# Patient Record
Sex: MALE | Race: WHITE | Employment: FULL TIME | URBAN - METROPOLITAN AREA
[De-identification: names, ages, dates, MRNs, and addresses within clinical notes are randomized per-mention and may not be internally consistent; named-entity substitution may affect disease eponyms.]

---

## 2019-07-31 ENCOUNTER — HOSPITAL ENCOUNTER (OUTPATIENT)
Dept: PHYSICAL THERAPY | Age: 49
Discharge: HOME | End: 2019-07-31
Payer: OTHER GOVERNMENT

## 2019-07-31 PROCEDURE — 97161 PT EVAL LOW COMPLEX 20 MIN: CPT

## 2019-07-31 PROCEDURE — 97140 MANUAL THERAPY 1/> REGIONS: CPT

## 2019-07-31 PROCEDURE — 97110 THERAPEUTIC EXERCISES: CPT

## 2019-07-31 NOTE — PROGRESS NOTES
PT DAILY TREATMENT NOTE/SHOULDER EVAL 10-18    Patient Name: Britta Olivia  Date:2019  : 1970  [x]  Patient  Verified  Payor:  / Plan: Lex Ordoñez 74 / Product Type:  /    In time:3:00  Out time:3:45  Total Treatment Time (min): 45  Visit #: 1 of 12    Treatment Area: Right shoulder pain [M25.511]    SUBJECTIVE  Pain Level (0-10 scale): 1  [x]constant []intermittent []improving []worsening []no change since onset    Any medication changes, allergies to medications, adverse drug reactions, diagnosis change, or new procedure performed?: [x] No    [] Yes (see summary sheet for update)  Subjective functional status/changes:     PLOF: working out 5 days  Limitations to PLOF: not able to workout  Mechanism of Injury: 2 weeks ago with next day after pull ups and push ups. Cortisone injection no help  Current symptoms/Complaints: Pain increases to 4/10 with push ups, pull ups. Describes pain as constant. Radicular symptoms in right UE with falling asleep on stomach. 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 \"    OBJECTIVE/EXAMINATION      27 min [x]Eval                  []Re-Eval       8 min Therapeutic Exercise:  [x] See flow sheet :   Rationale: increase ROM and increase strength to improve the patients ability to perform daily activities with decreased pain and symptom levels    10 min Manual Therapy:  PROM with overpressure into flexion, grade III GHJ mobs, STM to pec minor, subscap   Rationale: decrease pain, increase ROM and increase tissue extensibility to perform daily activities with decreased pain and symptom levels          With   [] TE   [] TA   [] neuro   [] other: Patient Education: [x] Review HEP    [] Progressed/Changed HEP based on:   [] positioning   [] body mechanics   [] transfers   [] heat/ice application    [] other:      Other Objective/Functional Measures: see initial [facility]     Physical Therapy Evaluation - Shoulder    Posture: [] Poor    [] Fair    [x] Good    Describe:    ROM:  [] Unable to assess at this time                                           AROM                                                              PROM   Left Right  Left Right   Flexion 165* 165*  Flexion     Extension   Extension     Scaption/ABD [facility] Scaptin/ABD     ER @ 0 Degrees   ER @ 0 Degrees     ER @ 90 Degrees WFL WFL ER @ 90 Degrees     IR @ 90 Degrees 50*  T7 27*   T12 IR @ 90 Degrees       End Feel / Painful Arc: firm     Strength:   [] Unable to assess at this time                                                                            L (1-5) R (1-5) Pain   Flexors 5 5 [] Yes   [] No   Abductors 5 5 [] Yes   [] No   External Rotators 5 5 [] Yes   [] No   Internal Rotators 5 5 [] Yes   [] No   Supraspinatus 5 4 [] Yes   [] No   Serratus Anterior 5 4 [] Yes   [] No   Lower Trapezius 4 4 [] Yes   [] No   Elbow Flexion 5 5 [] Yes   [] No   Elbow Extension 5 5 [] Yes   [] No       Scapulohumoral Control / Rhythm:  Able to eccentrically lower with good control?  Left: [] Yes   [x] No     Right: [] Yes   [x] No    Accessory Motions: lateral scapular winging on right, UT compensation    Palpation  [] Min  [x] Mod  [] Severe    Location: right pec minor, subscap, medial elbow flexors  [] Min  [] Mod  [] Severe    Location:  [] Min  [] Mod  [] Severe    Location:    Optional Tests:    Sensation Left Right Reflexes Left Right   Biceps (C5)   Biceps (C5)     [place] Radial(C6-7)   Brachioradialis (C6)     [place] Ulnar(C8-T1)   Triceps (C7)       Adson's Test  [] Pos   [] Neg Yergason's Test [] Pos   [x] Neg  Maranda's Test  [] Pos   [] Neg [place]'s Sign [] Pos   [] Neg  Neer's Test  [] Pos   [x] Neg Clunk Test  [] Pos   [] Neg  Hawkin's Test  [] Pos   [x] Neg AC Joint  [] Pos   [x] Neg  Speed's Test  [] Pos   [x] Neg SC Joint  [] Pos   [] Neg  Empty Can  [] Pos   [x] Neg Pectoral Tightness [x] Pos   [] Neg  Anterior Apprehension [] Pos   [x] Neg   Posterior Apprehension [] Pos   [x] Neg      Other Tests / Comments:        Pain Level (0-10 scale) post treatment: 0    ASSESSMENT/Changes in Function: Pt is a 48yo male presenting to therapy with c/c right shoulder and medial elbow pain after working out 2 weeks ago. Pt reports Xrays unremarkable with US showing RC tear. Pain increases with reaching overhead, abduction, and completing push ups and pull ups. Pt demonstrates decreased right shoulder AROM especially IR with increased thoracic extension noted, decreased scapular strength however WFL shoulder girdle, TTP over medial elbow flexors, pec minor, subscap, and lats, and positive impingement tests. Signs and symptoms consistent with mechanical shoulder pain possibly contributing to medial epicondylitis. Pt would benefit from skilled PT services to address the above impairments to allow pt to return to working out with pain.'     Patient will continue to benefit from skilled PT services to modify and progress therapeutic interventions, address functional mobility deficits, address ROM deficits, address strength deficits, analyze and address soft tissue restrictions, analyze and cue movement patterns, analyze and modify body mechanics/ergonomics, assess and modify postural abnormalities and instruct in home and community integration to attain remaining goals. []  See Plan of Care  []  See progress note/recertification  []  See Discharge Summary         Progress towards goals / Updated goals:  Short Term Goals: STG- To be accomplished in 2 week(s):  1. Pt will be independent with HEP to encourage prophylaxis. Eval: HEP dispensed     Long Term Goals: LTG- To be accomplished in 6 week(s):  1. Pt will report >/=75% improvement in symptoms to be able complete ADLs with less pain. Eval: 4/10 max pain in right shoulder and medial elbow with working out, reaching overhead and abduction    2. Pt will be able to complete pull ups and push ups without pain to demonstrate improved scapular strength  Eval:increased thoracic and lumbar extension, pain with both     3. Pt right shoulder IR AROM will improve to demonstrate improved shoulder and thoracic mobility. Eval: right 27* IR @ 90*, functional IR T12   Left 50* @ 90*, functional IR T7      4. Pt FOTO score will increase by >/=17 points to show improvement in subjective function.   Eval:57  Current: will address at visit 5      PLAN  []  Upgrade activities as tolerated     [x]  Continue plan of care  []  Update interventions per flow sheet       []  Discharge due to:_  []  Other:_      Brian Younger 7/31/2019  2:49 PM

## 2019-07-31 NOTE — PROGRESS NOTES
[facility] at the [address], [place], [address] [facility]  Phone: [phone]      Fax:  [phone]       Plan of Care/ Statement of Necessity for Physical Therapy Services      Patient name: Ankit Pompa Start of Care: 2019   Referral source: Venora Homans, MD : 1970    Medical Diagnosis: Right shoulder pain [M25.511]   Onset Date:2 weeks     Treatment Diagnosis: right shoulder pain and medial epicondylitis   Prior Hospitalization: see medical history Provider#: [id number]   Medications: Verified on Patient summary List    Comorbidities: unremarkable   Prior Level of Function: working out 5 days a week       The Plan of Care and following information is based on the information from the initial evaluation. Assessment/ key information: Pt is a 46yo male presenting to therapy with c/c right shoulder and medial elbow pain after working out 2 weeks ago. Pt reports Xrays unremarkable with US showing RC tear. Pain increases with reaching overhead, abduction, and completing push ups and pull ups. Pt demonstrates decreased right shoulder AROM especially IR with increased thoracic extension noted, decreased scapular strength however WFL shoulder girdle, TTP over medial elbow flexors, pec minor, subscap, and lats, and positive impingement tests. Signs and symptoms consistent with mechanical shoulder pain possibly contributing to medial epicondylitis.  Pt would benefit from skilled PT services to address the above impairments to allow pt to return to working out with pain.'     Evaluation Complexity History LOW Complexity : Zero comorbidities / personal factors that will impact the outcome / POC; Examination MEDIUM Complexity : 3 Standardized tests and measures addressing body structure, function, activity limitation and / or participation in recreation  ;Presentation LOW Complexity : Stable, uncomplicated  ;Clinical Decision Making MEDIUM Complexity : FOTO score of 26-74  Overall Complexity Rating: LOW   Problem List: pain affecting function, decrease ROM, decrease strength, decrease ADL/ functional abilitiies, decrease activity tolerance and decrease flexibility/ joint mobility   Treatment Plan may include any combination of the following: Therapeutic exercise, Therapeutic activities, Neuromuscular re-education, Physical agent/modality, Manual therapy, Patient education, Self Care training and Functional mobility training  Patient / Family readiness to learn indicated by: asking questions, trying to perform skills and interest  Persons(s) to be included in education: patient (P)  Barriers to Learning/Limitations: None  Patient Goal (s): increased strength and reduce pain  Patient Self Reported Health Status: good  Rehabilitation Potential: good  Short Term Goals: STG- To be accomplished in 2 week(s):  1. Pt will be independent with HEP to encourage prophylaxis. Eval: HEP dispensed      Long Term Goals: LTG- To be accomplished in 6 week(s):  1. Pt will report >/=75% improvement in symptoms to be able complete ADLs with less pain. Eval: 4/10 max pain in right shoulder and medial elbow with working out, reaching overhead and abduction     2. Pt will be able to complete pull ups and push ups without pain to demonstrate improved scapular strength  Eval:increased thoracic and lumbar extension, pain with both      3. Pt right shoulder IR AROM will improve to demonstrate improved shoulder and thoracic mobility. Eval: right 27* IR @ 90*, functional IR T12              Left 50* @ 90*, functional IR T7        4. Pt FOTO score will increase by >/=17 points to show improvement in subjective function. Eval:57  Current: will address at visit 5      Frequency / Duration: Patient to be seen 2 times per week for 6 weeks.     Patient/ Caregiver education and instruction: Diagnosis, prognosis, self care, activity modification and exercises   [x]  Plan of care has been reviewed with PTA Merlin Mallow [facility] 7/31/2019 4:29 PM  _____________________________________________________________________  I certify that the above Therapy Services are being furnished while the patient is under my care. I agree with the treatment plan and certify that this therapy is necessary.     Physician's Signature:____________Date:_________TIME:________    [employer], Date and Time must be completed for valid certification **    Please sign and return to [facility] at the [address], [place], [address] [facility]       Phone: [phone]      Fax:  [phone]

## 2019-08-02 ENCOUNTER — HOSPITAL ENCOUNTER (OUTPATIENT)
Dept: PHYSICAL THERAPY | Age: 49
Discharge: HOME | End: 2019-08-02
Payer: OTHER GOVERNMENT

## 2019-08-02 PROCEDURE — 97140 MANUAL THERAPY 1/> REGIONS: CPT

## 2019-08-02 PROCEDURE — 97110 THERAPEUTIC EXERCISES: CPT

## 2019-08-02 NOTE — PROGRESS NOTES
PT DAILY TREATMENT NOTE    Patient Name: Nikki Hernandez  Date:2019  : 1970  [x]  Patient  Verified  Payor: ANTONY / Plan: Lex Ordoñez 74 / Product Type: Quin Corona /    In time:10:40  Out time:11:40  Total Treatment Time (min): 60  Total Timed Codes (min): 60  1:1 Treatment Time ( only): 60   Visit #: 2 of 12    Treatment Area: Right shoulder pain [M25.511]    SUBJECTIVE  Pain Level (0-10 scale): 0  Any medication changes, allergies to medications, adverse drug reactions, diagnosis change, or new procedure performed?: [x] No    [] Yes (see summary sheet for update)  Subjective functional status/changes:   [] No changes reported  \"Felt better after last time. I dont think I was doing some of the exercises correctly though. \"    OBJECTIVE      50 min Therapeutic Exercise:  [x] See flow sheet :   Rationale: increase ROM and increase strength to improve the patients ability to perform daily activities with decreased pain and symptom levels      10 min Manual Therapy:  Grade III GHJ mobs, manual pec stretch, STM to levator    Rationale: decrease pain, increase ROM and increase tissue extensibility to improve the patients ability to perform daily activities with decreased pain and symptom levels    With   [] TE   [] TA   [] neuro   [] other: Patient Education: [x] Review HEP    [] Progressed/Changed HEP based on:   [] positioning   [] body mechanics   [] transfers   [] heat/ice application    [] other:      Other Objective/Functional Measures:   fatigue with wall clocks and slides  Increased lumbar extension with push ups      Pain Level (0-10 scale) post treatment: 0    ASSESSMENT/Changes in Function: good tolerance to exercises with ability to decrease pain with decreasing rib flare and engaging. Demonstrates decreased scapular strength with easily fatigue with wall clocks and slides.      Patient will continue to benefit from skilled PT services to modify and progress therapeutic interventions, address functional mobility deficits, address ROM deficits, address strength deficits, analyze and address soft tissue restrictions, analyze and cue movement patterns, analyze and modify body mechanics/ergonomics, assess and modify postural abnormalities and instruct in home and community integration to attain remaining goals. []  See Plan of Care  []  See progress note/recertification  []  See Discharge Summary         Progress towards goals / Updated goals:  Short Term Goals: STG- To be accomplished in 2 week(s):  1.  Pt will be independent with HEP to encourage prophylaxis. Eval: HEP dispensed   Current: compliance per pt report      Long Term Goals: LTG- To be accomplished in 6 week(s):  1.  Pt will report >/=75% improvement in symptoms to be able complete ADLs with less pain. Eval: 4/10 max pain in right shoulder and medial elbow with working out, reaching overhead and abduction  Current:      2.  Pt will be able to complete pull ups and push ups without pain to demonstrate improved scapular strength  Eval:increased thoracic and lumbar extension, pain with both   Current:      3.  Pt right shoulder IR AROM will improve to demonstrate improved shoulder and thoracic mobility. Eval: right 27* IR @ 90*, functional IR T12              Left 50* @ 90*, functional IR T7  Current:       4.  Pt FOTO score will increase by >/=17 points to show improvement in subjective function.   Eval:57  Current: will address at visit 5       PLAN  [x]  Upgrade activities as tolerated     [x]  Continue plan of care  []  Update interventions per flow sheet       []  Discharge due to:_  []  Other:_       Lanie Cure 8/2/2019  10:40 AM    Future Appointments   Date Time Provider [place]   8/2/2019 10:45 AM Barbra Bean [facility]   8/5/2019  2:30 PM Cara Cardenas, PT, DPT [facility]   8/7/2019  3:45 PM Barbra Bean [facility]   8/14/2019  3:45 PM Barbra Bean [facility]   8/16/2019 10:15 AM Remesic, Arnulfo Riser [facility]   8/20/2019  7:45 AM Remesic, Arnulfo Riser [facility]   8/23/2019  8:00 AM Remesic, Arnulfo Riser [facility]   8/27/2019  8:30 AM Remesic, Arnulfo Riser [facility]   8/28/2019 12:15 PM Remesic, Arnulfo Riser [facility]   9/4/2019  3:45 PM Remesic, Arnulfo Riser [facility]   9/6/2019  2:30 PM Remesic, Arnulfo Riser [facility]

## 2019-08-05 ENCOUNTER — HOSPITAL ENCOUNTER (OUTPATIENT)
Dept: PHYSICAL THERAPY | Age: 49
Discharge: HOME | End: 2019-08-05
Payer: OTHER GOVERNMENT

## 2019-08-05 PROCEDURE — 97140 MANUAL THERAPY 1/> REGIONS: CPT

## 2019-08-05 PROCEDURE — 97110 THERAPEUTIC EXERCISES: CPT

## 2019-08-05 NOTE — PROGRESS NOTES
PT DAILY TREATMENT NOTE    Patient Name: Michelle Crew  Date:2019  : 1970  [x]  Patient  Verified  Payor:  / Plan: Lex Ordoñez 74 / Product Type:  /    In time:2:30 pm   Out time:3:30 pm   Total Treatment Time (min): 60  Total Timed Codes (min): 60  Visit #: 3 of 12    Treatment Area: Right shoulder pain [M25.511]    SUBJECTIVE  Pain Level (0-10 scale): 0  Any medication changes, allergies to medications, adverse drug reactions, diagnosis change, or new procedure performed?: [x] No    [] Yes (see summary sheet for update)  Subjective functional status/changes:   [] No changes reported  \"Pretty good. \"    OBJECTIVE    Modalities Rationale:      min [] Estim, type/location:                                      []  att     []  unatt     []  w/US     []  w/ice    []  w/heat    min []  Mechanical Traction: type/lbs                   []  pro   []  sup   []  int   []  cont    []  before manual    []  after manual    min []  Ultrasound, settings/location:      min []  Iontophoresis w/ dexamethasone, location:                                               []  take home patch       []  in clinic    min []  Ice     []  Heat    location/position:     min []  Vasopneumatic Device, press/temp:     min []  Other:    [] Skin assessment post-treatment (if applicable):    []  intact    []  redness- no adverse reaction     []redness - adverse reaction:          45 min Therapeutic Exercise:  [x] See flow sheet :   Rationale: increase ROM, increase strength, improve coordination and increase proprioception to improve the patients ability to perform daily activities with decreased pain and symptom levels      15 min Manual Therapy:  Post and inf GHJ geoffrey grade III/IVto right shoulder  Right apical expansion, right fascial pec stretch  1-person rib mobilization with inhale and exhale  STM to right lat, subscap and infraspinatus    Rationale: decrease pain, increase ROM and increase tissue extensibility to improve the patients ability to perform daily activities with decreased pain and symptom levels           With   [] TE   [] TA   [] neuro   [] other: Patient Education: [x] Review HEP    [] Progressed/Changed HEP based on:   [] positioning   [] body mechanics   [] transfers   [] heat/ice application    [] other:      Other Objective/Functional Measures:   Right Shoulder IR at 90* Abd (pre/post): 61/74      Pain Level (0-10 scale) post treatment: 0    ASSESSMENT/Changes in Function:   Challenged with maintaining neutral pelvis with lat hang. Active trigger points in lats, subscap and infraspinatus    Patient will continue to benefit from skilled PT services to modify and progress therapeutic interventions, address functional mobility deficits, address ROM deficits, address strength deficits, analyze and address soft tissue restrictions, analyze and cue movement patterns, analyze and modify body mechanics/ergonomics, assess and modify postural abnormalities and instruct in home and community integration to attain remaining goals. []  See Plan of Care  []  See progress note/recertification  []  See Discharge Summary         Progress towards goals / Updated goals:  Short Term Goals: STG- To be accomplished in 2 week(s):  1.  Pt will be independent with HEP to encourage prophylaxis. Eval: HEP dispensed   Current: compliance per pt report, updated with lat hang     Long Term Goals: LTG- To be accomplished in 6 week(s):  1.  Pt will report >/=75% improvement in symptoms to be able complete ADLs with less pain.   Eval: 4/10 max pain in right shoulder and medial elbow with working out, reaching overhead and abduction  Current:      2.  Pt will be able to complete pull ups and push ups without pain to demonstrate improved scapular strength  Eval:increased thoracic and lumbar extension, pain with both   Current:      3.  Pt right shoulder IR AROM will improve to demonstrate improved shoulder and thoracic mobility. Eval: right 27* IR @ 90*, functional IR T12              Left 50* @ 90*, functional IR T7  Current:  Right IR at 90* abd: 74* - Progressing     4.  Pt FOTO score will increase by >/=17 points to show improvement in subjective function.   Eval:57  Current: will address at visit 5    PLAN  [x]  Upgrade activities as tolerated     []  Continue plan of care  []  Update interventions per flow sheet       []  Discharge due to:_  []  Other:_      Rodney Rebolledo, PT, DPT 8/5/2019  2:33 PM    Future Appointments   Date Time Provider [place]   8/7/2019  3:45 PM Remesic, Anita Fuss [facility]   8/14/2019  3:45 PM Remesic, Anita Fuss [facility]   8/16/2019 10:15 AM Remesic, Anita Fuss [facility]   8/20/2019  7:45 AM Remesic, Anita Fuss [facility]   8/23/2019  8:00 AM Remesic, Anita Fuss [facility]   8/27/2019  8:30 AM Remesic, Anita Fuss [facility]   8/28/2019 12:15 PM Remesic, Anita Fuss [facility]   9/4/2019  3:45 PM Remesic, Anita Fuss [facility]   9/6/2019  2:30 PM Remesic, Anita Fuss [facility]

## 2019-08-07 ENCOUNTER — HOSPITAL ENCOUNTER (OUTPATIENT)
Dept: PHYSICAL THERAPY | Age: 49
Discharge: HOME | End: 2019-08-07
Payer: OTHER GOVERNMENT

## 2019-08-07 PROCEDURE — 97140 MANUAL THERAPY 1/> REGIONS: CPT

## 2019-08-07 PROCEDURE — 97110 THERAPEUTIC EXERCISES: CPT

## 2019-08-07 NOTE — PROGRESS NOTES
PT DAILY TREATMENT NOTE    Patient Name: Gaby Wood  Date:2019 : 1970  [x]  Patient  Verified  Payor:  / Plan: Lex Ordoñez 74 / Product Type:  /    In time:3:41  Out time:4:50  Total Treatment Time (min): 69  Total Timed Codes (min): 69  1:1 Treatment Time ( [address] only): 39   Visit #: 4 of 12    Treatment Area: Right shoulder pain [M25.511]    SUBJECTIVE  Pain Level (0-10 scale): 0  Any medication changes, allergies to medications, adverse drug reactions, diagnosis change, or new procedure performed?: [x] No    [] Yes (see summary sheet for update)  Subjective functional status/changes:   [] No changes reported  \"Better. \"    OBJECTIVE      57 min Therapeutic Exercise:  [x] See flow sheet :   Rationale: increase ROM and increase strength to improve the patients ability to perform daily activities with decreased pain and symptom levels    12 min Manual Therapy:  Right apical expansion, rib mobs with breaths with verbal consent for hand placement, STM to lats and subscap in supine, manual pec stretch    Rationale: decrease pain, increase ROM and increase tissue extensibility to improve the patients ability to perform daily activities with decreased pain and symptom levels          With   [] TE   [] TA   [] neuro   [] other: Patient Education: [x] Review HEP    [] Progressed/Changed HEP based on:   [] positioning   [] body mechanics   [] transfers   [] heat/ice application    [] other:      Other Objective/Functional Measures:   Decreased coordination with lat hang     Pain Level (0-10 scale) post treatment: 0    ASSESSMENT/Changes in Function: good tolerance to exercises with no pain reported. Continues to demonstrate decreased lat flexibility with reaching overhead with increased thoracic extension noted. Good form with QP exercises today with decrease pec use.       Patient will continue to benefit from skilled PT services to modify and progress therapeutic interventions, address functional mobility deficits, address ROM deficits, address strength deficits, analyze and address soft tissue restrictions, analyze and cue movement patterns, analyze and modify body mechanics/ergonomics, assess and modify postural abnormalities and instruct in home and community integration to attain remaining goals. []  See Plan of Care  []  See progress note/recertification  []  See Discharge Summary         Progress towards goals / Updated goals:  Short Term Goals: STG- To be accomplished in 2 week(s):  1.  Pt will be independent with HEP to encourage prophylaxis. Eval: HEP dispensed   Current: compliance per pt report, updated with lat hang     Long Term Goals: LTG- To be accomplished in 6 week(s):  1.  Pt will report >/=75% improvement in symptoms to be able complete ADLs with less pain. Eval: 4/10 max pain in right shoulder and medial elbow with working out, reaching overhead and abduction  Current:      2.  Pt will be able to complete pull ups and push ups without pain to demonstrate improved scapular strength  Eval:increased thoracic and lumbar extension, pain with both   Current: challenged in QP     3.  Pt right shoulder IR AROM will improve to demonstrate improved shoulder and thoracic mobility. Eval: right 27* IR @ 90*, functional IR T12              Left 50* @ 90*, functional IR T7  Current:  Right IR at 90* abd: 74* - Progressing     4.  Pt FOTO score will increase by >/=17 points to show improvement in subjective function.   Eval:57  Current: will address at visit 5    PLAN  [x]  Upgrade activities as tolerated     [x]  Continue plan of care  []  Update interventions per flow sheet       []  Discharge due to:_  []  Other:_      Nicol Victoria 8/7/2019  4:17 PM    Future Appointments   Date Time Provider Tracy Tsai   8/14/2019  3:45 PM Ernestina Bean [facility]   8/16/2019 11:00 AM Ernestina Bean [facility]   8/20/2019  7:45 AM Ernestina Bean [facility]   8/23/2019 8:00 AM Castro Bean [facility]   8/27/2019  8:30 AM Castro BeanTBMIGUEL [facility]   8/28/2019 12:15 PM Castro BeanTBMIGUEL [facility]   9/4/2019  3:45 PM Castro BeanTBMIGUEL [facility]   9/6/2019  2:30 PM Castro Bean [facility]

## 2019-08-14 ENCOUNTER — HOSPITAL ENCOUNTER (OUTPATIENT)
Dept: PHYSICAL THERAPY | Age: 49
Discharge: HOME | End: 2019-08-14
Payer: OTHER GOVERNMENT

## 2019-08-14 PROCEDURE — 97140 MANUAL THERAPY 1/> REGIONS: CPT

## 2019-08-14 PROCEDURE — 97110 THERAPEUTIC EXERCISES: CPT

## 2019-08-14 NOTE — PROGRESS NOTES
PT DAILY TREATMENT NOTE    Patient Name: Nena Messing  Date:2019  : 1970  [x]  Patient  Verified  Payor:  / Plan: Lex Ordoñez 74 / Product Type:  /    In time:3:42  Out time:4:55  Total Treatment Time (min): 73  Total Timed Codes (min): 73  1:1 Treatment Time ( [address] only): 40   Visit #: 5 of 12    Treatment Area: Right shoulder pain [M25.511]    SUBJECTIVE  Pain Level (0-10 scale): 0  Any medication changes, allergies to medications, adverse drug reactions, diagnosis change, or new procedure performed?: [x] No    [] Yes (see summary sheet for update)  Subjective functional status/changes:   [] No changes reported  \"good. No longer having elbow pain but also havent beeen push ups and pullups like I was. \"    OBJECTIVE      63 min Therapeutic Exercise:  [x] See flow sheet :   Rationale: increase ROM and increase strength to improve the patients ability to perform daily activities with decreased pain and symptom levels    10 min Manual Therapy:  Manual pec stretch, STM to elbowl flexor wad, right apical expansion, rib mobs with breaths    Rationale: decrease pain, increase ROM and increase tissue extensibility to improve the patients ability to perform daily activities with decreased pain and symptom levels    With   [] TE   [] TA   [] neuro   [] other: Patient Education: [x] Review HEP    [] Progressed/Changed HEP based on:   [] positioning   [] body mechanics   [] transfers   [] heat/ice application    [] other:      Other Objective/Functional Measures:   FOTO 74  Increased extension with pull ups     Pain Level (0-10 scale) post treatment: 0    ASSESSMENT/Changes in Function: Pt reporting no longer having right elbow pain with minimal shoulder pain. Overview of pull up form today with education to start with eccentrics and using squat bar to help with form. Overall form is improving with exercises however continued lumbar extension with planks.      Patient will continue to benefit from skilled PT services to modify and progress therapeutic interventions, address functional mobility deficits, address ROM deficits, address strength deficits, analyze and address soft tissue restrictions, analyze and cue movement patterns, analyze and modify body mechanics/ergonomics, assess and modify postural abnormalities and instruct in home and community integration to attain remaining goals. []  See Plan of Care  []  See progress note/recertification  []  See Discharge Summary         Progress towards goals / Updated goals:  Short Term Goals: STG- To be accomplished in 2 week(s):  1.  Pt will be independent with HEP to encourage prophylaxis. Eval: HEP dispensed   Current: compliance per pt report, updated with lat hang goal MET     Long Term Goals: LTG- To be accomplished in 6 week(s):  1.  Pt will report >/=75% improvement in symptoms to be able complete ADLs with less pain. Eval: 4/10 max pain in right shoulder and medial elbow with working out, reaching overhead and abduction  Current:      2.  Pt will be able to complete pull ups and push ups without pain to demonstrate improved scapular strength  Eval:increased thoracic and lumbar extension, pain with both   Current: increased extension with pull ups in clinic     3.  Pt right shoulder IR AROM will improve to demonstrate improved shoulder and thoracic mobility. Eval: right 27* IR @ 90*, functional IR T12              Left 50* @ 90*, functional IR T7  Current:  Right IR at 90* abd: 74* - Progressing     4.  Pt FOTO score will increase by >/=17 points to show improvement in subjective function.   Eval:57  Current:74 goal MET    PLAN  [x]  Upgrade activities as tolerated     [x]  Continue plan of care  []  Update interventions per flow sheet       []  Discharge due to:_  []  Other:_      Huyen Nelson 8/14/2019  3:51 PM    Future Appointments   Date Time Provider Tracy Tsai   8/16/2019 11:00 AM Miriam Bean [facility] 8/20/2019  7:45 AM RemayalacOsiel [facility]   8/23/2019  8:00 AM Remayalac Loras Bottoms [facility]   8/27/2019  8:30 AM RemayalacOsiel Bottoms [facility]   8/28/2019 12:15 PM RemOsiel stearns [facility]   9/4/2019  3:45 PM RemayalacOsiels [facility]   9/6/2019  2:30 PM RemesicOsiel Bottoms [facility]

## 2019-08-16 ENCOUNTER — HOSPITAL ENCOUNTER (OUTPATIENT)
Dept: PHYSICAL THERAPY | Age: 49
Discharge: HOME | End: 2019-08-16
Payer: OTHER GOVERNMENT

## 2019-08-16 PROCEDURE — 97110 THERAPEUTIC EXERCISES: CPT

## 2019-08-16 PROCEDURE — 97140 MANUAL THERAPY 1/> REGIONS: CPT

## 2019-08-16 NOTE — PROGRESS NOTES
PT DAILY TREATMENT NOTE    Patient Name: Nereida Guzman  Date:2019  : 1970  [x]  Patient  Verified  Payor:  / Plan: Lyndal Leap / Product Type: Dianah Ode /     In time:11:00  Out time:11:55  Total Treatment Time (min): 55  Total Timed Codes (min): 55  1:1 Treatment Time ( [address] only): 54   Visit #: 6 of 12    Treatment Area: Right shoulder pain [M25.511]    SUBJECTIVE  Pain Level (0-10 scale): 0  Any medication changes, allergies to medications, adverse drug reactions, diagnosis change, or new procedure performed?: [x] No    [] Yes (see summary sheet for update)  Subjective functional status/changes:   [] No changes reported  \"Good. \"    OBJECTIVE    45 min Therapeutic Exercise:  [x] See flow sheet :   Rationale: increase ROM and increase strength to improve the patients ability to .perform daily activities with decreased pain and symptom levels    10 min Manual Therapy:  Right apical expansion, rib mobs with breaths, grade III GHJ mobs    Rationale: decrease pain, increase ROM and increase tissue extensibility to improve the patients ability to perform daily activities with decreased pain and symptom levels          With   [] TE   [] TA   [] neuro   [] other: Patient Education: [x] Review HEP    [] Progressed/Changed HEP based on:   [] positioning   [] body mechanics   [] transfers   [] heat/ice application    [] other:      Other Objective/Functional Measures:   50* AROM IR @90*     Pain Level (0-10 scale) post treatment: 0    ASSESSMENT/Changes in Function: Good tolerance to exercises with no pain however cues needed to decreased lumbar extension. Increased fatigue with pull up with squat bar with proper form. Updated HEP given today.      Patient will continue to benefit from skilled PT services to modify and progress therapeutic interventions, address functional mobility deficits, address ROM deficits, address strength deficits, analyze and address soft tissue restrictions, analyze and cue movement patterns, analyze and modify body mechanics/ergonomics, assess and modify postural abnormalities and instruct in home and community integration to attain remaining goals. []  See Plan of Care  []  See progress note/recertification  []  See Discharge Summary         Progress towards goals / Updated goals:  Short Term Goals: STG- To be accomplished in 2 week(s):  1.  Pt will be independent with HEP to encourage prophylaxis. Eval: HEP dispensed   Current: compliance per pt report, updated with lat hang goal MET     Long Term Goals: LTG- To be accomplished in 6 week(s):  1.  Pt will report >/=75% improvement in symptoms to be able complete ADLs with less pain. Eval: 4/10 max pain in right shoulder and medial elbow with working out, reaching overhead and abduction  Current:      2.  Pt will be able to complete pull ups and push ups without pain to demonstrate improved scapular strength  Eval:increased thoracic and lumbar extension, pain with both   Current: increased extension with pull ups in clinic, able to complete lat pull downs without pain progressing      3.  Pt right shoulder IR AROM will improve to demonstrate improved shoulder and thoracic mobility. Eval: right 27* IR @ 90*, functional IR T12              Left 50* @ 90*, functional IR T7  Current:  Right IR at 90* 50*,  - progressing      4.  Pt FOTO score will increase by >/=17 points to show improvement in subjective function.   Eval:57  Current:74 goal MET    PLAN  [x]  Upgrade activities as tolerated     [x]  Continue plan of care  []  Update interventions per flow sheet       []  Discharge due to:_  []  Other:_      Ochoa Whaley 8/16/2019  11:08 AM    Future Appointments   Date Time Provider Tracy Tsai   8/20/2019  7:45 AM Lisa Bean THE [facility]   8/23/2019  8:00 AM Lisa Bean THE [facility]   8/27/2019  8:30 AM Lisa Bean THE [facility]   8/28/2019 12:15 PM Lisa Bean THE [facility]   9/4/2019  3:45 Brook Pires THE [facility]   9/6/2019  2:30 Brook Pires THE [facility]

## 2019-08-20 ENCOUNTER — HOSPITAL ENCOUNTER (OUTPATIENT)
Dept: PHYSICAL THERAPY | Age: 49
Discharge: HOME | End: 2019-08-20
Payer: OTHER GOVERNMENT

## 2019-08-20 PROCEDURE — 97110 THERAPEUTIC EXERCISES: CPT

## 2019-08-20 PROCEDURE — 97140 MANUAL THERAPY 1/> REGIONS: CPT

## 2019-08-20 NOTE — PROGRESS NOTES
PT DAILY TREATMENT NOTE    Patient Name: Kalin Rangel  Date:2019  : 1970  [x]  Patient  Verified  Payor:  / Plan: Lex Ordoñez 74 / Product Type:  /    In time:7:47  Out time:8:35  Total Treatment Time (min): 48  Total Timed Codes (min): 48  1:1 Treatment Time ( [address] only): 48  Visit #: 7 of 12    Treatment Area: Right shoulder pain [M25.511]    SUBJECTIVE  Pain Level (0-10 scale): 0  Any medication changes, allergies to medications, adverse drug reactions, diagnosis change, or new procedure performed?: [x] No    [] Yes (see summary sheet for update)  Subjective functional status/changes:   [] No changes reported  \"No pain at the gym this morning. I am still doing push ups from my knees to help with my form. \"    OBJECTIVE    38 min Therapeutic Exercise:  [x] See flow sheet :   Rationale: increase ROM and increase strength to improve the patients ability to perform daily activities with decreased pain and symptom levels    10 min Manual Therapy:  Right apical expansion, rib mobs with breaths, STM to lats, manual pec stretch in supine, grade III GHJ mobs    Rationale: decrease pain, increase ROM and increase tissue extensibility to improve the patients ability to perform daily activities with decreased pain and symptom levels          With   [] TE   [] TA   [] neuro   [] other: Patient Education: [x] Review HEP    [] Progressed/Changed HEP based on:   [] positioning   [] body mechanics   [] transfers   [] heat/ice application    [] other:      Other Objective/Functional Measures:   Cues to decrease rib flare with lat pull downs      Pain Level (0-10 scale) post treatment: 0    ASSESSMENT/Changes in Function: Increase rib flare with exercises still needing cues to correct. Able to engage lats with pull ups from bar at rack today.      Patient will continue to benefit from skilled PT services to modify and progress therapeutic interventions, address functional mobility deficits, address ROM deficits, address strength deficits, analyze and address soft tissue restrictions, analyze and cue movement patterns, analyze and modify body mechanics/ergonomics, assess and modify postural abnormalities and instruct in home and community integration to attain remaining goals. []  See Plan of Care  []  See progress note/recertification  []  See Discharge Summary         Progress towards goals / Updated goals:  Short Term Goals: STG- To be accomplished in 2 week(s):  1.  Pt will be independent with HEP to encourage prophylaxis. Eval: HEP dispensed   Current: compliance per pt report, updated with lat hang goal MET     Long Term Goals: LTG- To be accomplished in 6 week(s):  1.  Pt will report >/=75% improvement in symptoms to be able complete ADLs with less pain. Eval: 4/10 max pain in right shoulder and medial elbow with working out, reaching overhead and abduction  Current: no longer having pain in elbow, poor form with body weight pull ups and push ups still - progressing      2.  Pt will be able to complete pull ups and push ups without pain to demonstrate improved scapular strength  Eval:increased thoracic and lumbar extension, pain with both   Current: increased extension with pull ups in clinic, able to complete lat pull downs without pain progressing      3.  Pt right shoulder IR AROM will improve to demonstrate improved shoulder and thoracic mobility. Eval: right 27* IR @ 90*, functional IR T12              Left 50* @ 90*, functional IR T7  Current:  Right IR at 90* 50*,  - progressing      4.  Pt FOTO score will increase by >/=17 points to show improvement in subjective function.   Eval:57  Current:74 goal MET       PLAN  [x]  Upgrade activities as tolerated     [x]  Continue plan of care  []  Update interventions per flow sheet       []  Discharge due to:_  []  Other:_      Adria Story 8/20/2019  7:49 AM    Future Appointments   Date Time Provider Tracy Tsai   8/23/2019  8:00 AM RemesicTasneem Poag [facility]   8/27/2019  8:30 AM RemesicTasneem Poag [facility]   8/28/2019 12:15 PM Remesic [facility]em [facility]   9/4/2019  3:45 PM Remesic Tasneem Poag [facility]   9/6/2019  2:30 PM RemayalacTasneem Poag MI[facility]PT[facility]

## 2019-08-23 ENCOUNTER — HOSPITAL ENCOUNTER (OUTPATIENT)
Dept: PHYSICAL THERAPY | Age: 49
Discharge: HOME | End: 2019-08-23
Payer: OTHER GOVERNMENT

## 2019-08-23 PROCEDURE — 97110 THERAPEUTIC EXERCISES: CPT

## 2019-08-23 NOTE — PROGRESS NOTES
PT DAILY TREATMENT NOTE    Patient Name: Janet Patient  Date:2019  : 1970  [x]  Patient  Verified  Payor:  / Plan: Lex Ordoñez 74 / Product Type:  /    In time:7:55  Out time:8:43  Total Treatment Time (min): 48  Total Timed Codes (min): 48  1:1 Treatment Time ( [address] only): 48   Visit #: 8 of 12    Treatment Area: Right shoulder pain [M25.511]    SUBJECTIVE  Pain Level (0-10 scale): 0  Any medication changes, allergies to medications, adverse drug reactions, diagnosis change, or new procedure performed?: [x] No    [] Yes (see summary sheet for update)  Subjective functional status/changes:   [] No changes reported  \"I had some pain with stretching this morning and yesterday but no pain with the modified push ups and pull ups. \"    OBJECTIVE    48 min Therapeutic Exercise:  [x] See flow sheet :   Rationale: increase ROM and increase strength to improve the patients ability to perform daily activities with decreased pain and symptom levels          With   [] TE   [] TA   [] neuro   [] other: Patient Education: [x] Review HEP    [] Progressed/Changed HEP based on:   [] positioning   [] body mechanics   [] transfers   [] heat/ice application    [] other:      Other Objective/Functional Measures:   Decreased stability with dynamic planks     Pain Level (0-10 scale) post treatment: 0    ASSESSMENT/Changes in Function: good tolerance to exercises with able to hold neutral pelvis with plank activities today. Pt reporting 60% overall improvement in symptoms with less overall pain with activities and working out.      Patient will continue to benefit from skilled PT services to modify and progress therapeutic interventions, address functional mobility deficits, address ROM deficits, address strength deficits, analyze and address soft tissue restrictions, analyze and cue movement patterns, analyze and modify body mechanics/ergonomics, assess and modify postural abnormalities and instruct in home and community integration to attain remaining goals. []  See Plan of Care  []  See progress note/recertification  []  See Discharge Summary         Progress towards goals / Updated goals:  Short Term Goals: STG- To be accomplished in 2 week(s):  1.  Pt will be independent with HEP to encourage prophylaxis. Eval: HEP dispensed   Current: compliance per pt report, updated with lat hang goal MET     Long Term Goals: LTG- To be accomplished in 6 week(s):  1.  Pt will report >/=75% improvement in symptoms to be able complete ADLs with less pain. Eval: 4/10 max pain in right shoulder and medial elbow with working out, reaching overhead and abduction  Current: 60% improvement, 2-3/10 max pain progressing      2.  Pt will be able to complete pull ups and push ups without pain to demonstrate improved scapular strength  Eval:increased thoracic and lumbar extension, pain with both   Current: increased extension with pull ups in clinic, able to complete lat pull downs without pain progressing      3.  Pt right shoulder IR AROM will improve to demonstrate improved shoulder and thoracic mobility. Eval: right 27* IR @ 90*, functional IR T12              Left 50* @ 90*, functional IR T7  Current:  Right IR at 90* 50*,  - progressing      4.  Pt FOTO score will increase by >/=17 points to show improvement in subjective function.   Eval:57  Current:74 goal MET       PLAN  [x]  Upgrade activities as tolerated     [x]  Continue plan of care  []  Update interventions per flow sheet       []  Discharge due to:_  []  Other:_      Michael Garces 8/23/2019  7:59 AM    Future Appointments   Date Time Provider Tracy Tsai   8/23/2019  8:00 AM Yo Beane Juaniws [facility]   8/27/2019  8:30 AM Remdaryn Caffie Crews [facility]   8/28/2019 12:15 PM Connor Beanfie Crews [facility]   9/4/2019  3:45 PM RemConnor stearnsfie Crews MI[facility]PTBW [facility]   9/6/2019  2:30 PM RemConnor stearnsfie Willem MI[facility]PTBW [facility]

## 2019-08-27 ENCOUNTER — HOSPITAL ENCOUNTER (OUTPATIENT)
Dept: PHYSICAL THERAPY | Age: 49
Discharge: HOME | End: 2019-08-27
Payer: OTHER GOVERNMENT

## 2019-08-27 PROCEDURE — 97110 THERAPEUTIC EXERCISES: CPT

## 2019-08-27 NOTE — PROGRESS NOTES
PT DAILY TREATMENT NOTE    Patient Name: Pily Watts  Date:2019  : 1970  [x]  Patient  Verified  Payor: ANTONY / Plan: Lex Ordoñez 74 / Product Type: Kenia Camp /    In time:8:25  Out time:9:28  Total Treatment Time (min): 63  Total Timed Codes (min): 63  1:1 Treatment Time ( [address] only): 63   Visit #: 9 of 12     Treatment Area: Right shoulder pain [M25.511]    SUBJECTIVE  Pain Level (0-10 scale): 0  Any medication changes, allergies to medications, adverse drug reactions, diagnosis change, or new procedure performed?: [x] No    [] Yes (see summary sheet for update)  Subjective functional status/changes:   [] No changes reported  \"good no pain. \"    OBJECTIVE      63 min Therapeutic Exercise:  [x] See flow sheet :   Rationale: increase ROM and increase strength to improve the patients ability to perform daily activities with decreased pain and symptom levels    With   [] TE   [] TA   [] neuro   [] other: Patient Education: [x] Review HEP    [] Progressed/Changed HEP based on:   [] positioning   [] body mechanics   [] transfers   [] heat/ice application    [] other:      Other Objective/Functional Measures:   See updated goals below     Pain Level (0-10 scale) post treatment: 0    ASSESSMENT/Changes in Function: Pt presented to therapy with c/c right shoulder and medial elbow pain after working out few weeks ago. Pt has attended 9 sessions improving ROM, strength and overall mechanics and form with pull ups and push ups with reporting 60% improvement since starting therapy. Pt reporting no longer having pain in shoulder or elbow however still unable to complete push up without lumbar extension and body weight pull up due to decreased strength. Pt would benefit from continued skilled PT services to address remaining unmet goals.      Patient will continue to benefit from skilled PT services to modify and progress therapeutic interventions, address functional mobility deficits, address ROM deficits, address strength deficits, analyze and address soft tissue restrictions, analyze and cue movement patterns, analyze and modify body mechanics/ergonomics, assess and modify postural abnormalities and instruct in home and community integration to attain remaining goals. []  See Plan of Care  []  See progress note/recertification  []  See Discharge Summary         Progress towards goals / Updated goals:  Short Term Goals: STG- To be accomplished in 2 week(s):  1.  Pt will be independent with HEP to encourage prophylaxis. Eval: HEP dispensed   Current: compliance per pt report, updated with lat hang goal MET     Long Term Goals: LTG- To be accomplished in 6 week(s):  1.  Pt will report >/=75% improvement in symptoms to be able complete ADLs with less pain. Eval: 4/10 max pain in right shoulder and medial elbow with working out, reaching overhead and abduction  Current: 60% improvement, 2-3/10 max pain progressing      2.  Pt will be able to complete pull ups and push ups without pain to demonstrate improved scapular strength  Eval:increased thoracic and lumbar extension, pain with both   Current: increased extension with pull ups in clinic, able to complete lat pull downs without pain progressing      3.  Pt right shoulder IR AROM will improve to demonstrate improved shoulder and thoracic mobility. Eval: right 27* IR @ 90*, functional IR T12              Left 50* @ 90*, functional IR T7  Current:  Right IR at 90* 50*, functional IR T 10  - progressing      4.  Pt FOTO score will increase by >/=17 points to show improvement in subjective function.   Eval:57  Current:74 goal MET       PLAN  [x]  Upgrade activities as tolerated     [x]  Continue plan of care  []  Update interventions per flow sheet       []  Discharge due to:_  []  Other:_      Huyen Nelson 8/27/2019  8:26 AM    Future Appointments   Date Time Provider [place] Tsai   8/27/2019  8:30 AM Miriam Bean [facility]PT[facility]   8/29/2019  8:15 AM Swapnil Malcolm, PT, DPT [facility]   9/4/2019  3:45 PM Byron Bean THE [facility]   9/5/2019  7:30 AM Swapnil Malcolm, PT, DPT [facility] THE [facility]

## 2019-08-27 NOTE — PROGRESS NOTES
[facility] at the [address], [place], [address] [facility]  Phone: [phone]      Fax:  [phone]    Progress Note  Patient name: Nena Martin Start of Care: 19   Referral source: Eugene Centeno MD : 1970   Medical/Treatment Diagnosis: Right shoulder pain [M25.511] Onset Date:2 weeks     Prior Hospitalization: see medical history Provider#: [id number]   Medications: Verified on Patient Summary List    Comorbidities: unremarkable   Prior Level of Function: working out 5 days a week     Visits from Start of Care: 9    Missed Visits: 0      Short Term Goals: STG- To be accomplished in 2 week(s):  1.  Pt will be independent with HEP to encourage prophylaxis. Eval: HEP dispensed   Current: compliance per pt report, updated with lat hang goal MET     Long Term Goals: LTG- To be accomplished in 6 week(s):  1.  Pt will report >/=75% improvement in symptoms to be able complete ADLs with less pain. Eval: 4/10 max pain in right shoulder and medial elbow with working out, reaching overhead and abduction  Current: 60% improvement, 2-3/10 max pain progressing      2.  Pt will be able to complete pull ups and push ups without pain to demonstrate improved scapular strength  Eval:increased thoracic and lumbar extension, pain with both   Current: increased extension with pull ups in clinic, able to complete lat pull downs without pain progressing      3.  Pt right shoulder IR AROM will improve to demonstrate improved shoulder and thoracic mobility. Eval: right 27* IR @ 90*, functional IR T12              Left 50* @ 90*, functional IR T7  Current:  Right IR at 90* 50*, functional IR T 10  - progressing      4.  Pt FOTO score will increase by >/=17 points to show improvement in subjective function. Eval:57  Current:74 goal MET    Key Functional Changes:  Pt presented to therapy with c/c right shoulder and medial elbow pain after working out few weeks ago.  Pt has attended 9 sessions improving ROM, strength and overall mechanics and form with pull ups and push ups with reporting 60% improvement since starting therapy. Pt reporting no longer having pain in shoulder or elbow however still unable to complete push up without lumbar extension and body weight pull up due to decreased strength. Pt would benefit from continued skilled PT services to address remaining unmet goals.      Updated Goals: to be achieved in 6 treatments:   See unmet above    ASSESSMENT/RECOMMENDATIONS:   [x]Continue therapy per initial plan/protocol at a frequency of  2 x per week for 3 weeks  []Continue therapy with the following recommended changes:_____________________      _____________________________________________________________________  []Discontinue therapy progressing towards or have reached established goals  []Discontinue therapy due to lack of appreciable progress towards goals  []Discontinue therapy due to lack of attendance or compliance  []Await Physician's recommendations/decisions regarding therapy  []Other:________________________________________________________________    Thank you for this referral.   Ferron Jean [facility] 8/27/2019 8:52 AM  NOTE TO PHYSICIAN:  PLEASE COMPLETE THE ORDERS BELOW AND   FAX TO [facility]: [phone]  If you are unable to process this request in 24 hours please contact our office: [phone]        []  I have read the above report and request that my patient continue as recommended. []  I have read the above report and request that my patient continue therapy with the following changes/special instructions:________________________________________  []I have read the above report and request that my patient be discharged from therapy.     [de-identified] Signature:____________Date:_________TIME:________    [facility], Date and Time must be completed for valid certification **

## 2019-08-29 ENCOUNTER — HOSPITAL ENCOUNTER (OUTPATIENT)
Dept: PHYSICAL THERAPY | Age: 49
Discharge: HOME | End: 2019-08-29
Payer: OTHER GOVERNMENT

## 2019-08-29 PROCEDURE — 97110 THERAPEUTIC EXERCISES: CPT

## 2019-08-29 NOTE — PROGRESS NOTES
PT DAILY TREATMENT NOTE    Patient Name: Lanre Suarez  Date:2019  : 1970  [x]  Patient  Verified  Payor: ANTONY / Plan: Lex Ordoñez 74 / Product Type: Veronica Brown /    In time:8:10 am  Out time:9:15 am   Total Treatment Time (min): 65  Visit #: 10 of 15    Treatment Area: Right shoulder pain [M25.511]    SUBJECTIVE  Pain Level (0-10 scale): 0  Any medication changes, allergies to medications, adverse drug reactions, diagnosis change, or new procedure performed?: [x] No    [] Yes (see summary sheet for update)  Subjective functional status/changes:   [] No changes reported  \"Better. Definitely better. \"    OBJECTIVE    Modalities Rationale:     min []  Mechanical Traction: type/lbs                   []  pro   []  sup   []  int   []  cont    []  before manual    []  after manual    min []  Ultrasound, settings/location:      min []  Iontophoresis w/ dexamethasone, location:                                               []  take home patch       []  in clinic    min []  Ice     []  Heat    location/position:     min []  Vasopneumatic Device, press/temp:     min []  Other:    [] Skin assessment post-treatment (if applicable):    []  intact    []  redness- no adverse reaction     []redness - adverse reaction:          65 min Therapeutic Exercise:  [x] See flow sheet :   Rationale: increase ROM, increase strength, improve coordination and increase proprioception to improve the patients ability to perform daily activities with decreased pain and symptom levels            With   [x] TE   [] TA   [] neuro   [] other: Patient Education: [x] Review HEP    [] Progressed/Changed HEP based on:   [] positioning   [] body mechanics   [] transfers   [] heat/ice application    [] other:      Other Objective/Functional Measures:   Improved rib IR     Pain Level (0-10 scale) post treatment: 0    ASSESSMENT/Changes in Function:   Pt challenged with quadruped 3-way reach and asymmetrical plank/all four belly. Emphasized good form with activities and not increasing lumbar ext. Patient will continue to benefit from skilled PT services to modify and progress therapeutic interventions, address functional mobility deficits, address ROM deficits, address strength deficits, analyze and address soft tissue restrictions, analyze and cue movement patterns, analyze and modify body mechanics/ergonomics, assess and modify postural abnormalities and instruct in home and community integration to attain remaining goals. []  See Plan of Care  []  See progress note/recertification  []  See Discharge Summary         Progress towards goals / Updated goals:  Short Term Goals: STG- To be accomplished in 2 week(s):  1.  Pt will be independent with HEP to encourage prophylaxis. Eval: HEP dispensed   Current: compliance per pt report, updated with lat hang goal MET     Long Term Goals: LTG- To be accomplished in 6 week(s):  1.  Pt will report >/=75% improvement in symptoms to be able complete ADLs with less pain. Eval: 4/10 max pain in right shoulder and medial elbow with working out, reaching overhead and abduction  Last PN: 60% improvement, 2-3/10 max pain progressing   Current:     2.  Pt will be able to complete pull ups and push ups without pain to demonstrate improved scapular strength  Eval:increased thoracic and lumbar extension, pain with both   Last PN: increased extension with pull ups in clinic, able to complete lat pull downs without pain progressing   Current: Progressing increased tolerance to push-up position and no pain with lat pulls      3.  Pt right shoulder IR AROM will improve to demonstrate improved shoulder and thoracic mobility. Eval: right 27* IR @ 90*, functional IR T12              Left 50* @ 90*, functional IR T7  Last PN:  Right IR at 90* 50*, functional IR T 10  - progressing   Current:     4.  Pt FOTO score will increase by >/=17 points to show improvement in subjective function.   Eval:57  Current:74 goal MET    PLAN  [x]  Upgrade activities as tolerated     []  Continue plan of care  []  Update interventions per flow sheet       []  Discharge due to:_  []  Other:_      Consuelo Rodrigez, PT, DPT 8/29/2019  8:13 AM    Future Appointments   Date Time Provider [place] Tsai   8/29/2019  8:15 AM Alexus Richardson, PT, DPT [facility] THE [facility]   9/4/2019  3:45 PM Tasneem BeanHPNITZAW THE [facility]   9/5/2019  7:30 AM Alexus Richardson, PT, DPT [facility] THE [facility]

## 2019-08-30 ENCOUNTER — APPOINTMENT (OUTPATIENT)
Dept: PHYSICAL THERAPY | Age: 49
End: 2019-08-30
Payer: OTHER GOVERNMENT

## 2019-09-04 ENCOUNTER — HOSPITAL ENCOUNTER (OUTPATIENT)
Dept: PHYSICAL THERAPY | Age: 49
Discharge: HOME | End: 2019-09-04
Payer: OTHER GOVERNMENT

## 2019-09-04 PROCEDURE — 97110 THERAPEUTIC EXERCISES: CPT

## 2019-09-04 NOTE — PROGRESS NOTES
PT DAILY TREATMENT NOTE    Patient Name: Maria Alejandra Gauthier  Date:2019  : 1970  [x]  Patient  Verified  Payor:  / Plan: Lex Ordoñez 74 / Product Type:  /    In time:3:32  Out time:4:20  Total Treatment Time (min): 48  Total Timed Codes (min): 48  1:1 Treatment Time ( only): 48   Visit #: 11 of 15    Treatment Area: Right shoulder pain [M25.511]    SUBJECTIVE  Pain Level (0-10 scale): 0  Any medication changes, allergies to medications, adverse drug reactions, diagnosis change, or new procedure performed?: [x] No    [] Yes (see summary sheet for update)  Subjective functional status/changes:   [] No changes reported  \"good. No pain. Haven;t tried doing a pull up or push up yet. \"    OBJECTIVE      48 min Therapeutic Exercise:  [x] See flow sheet :   Rationale: increase ROM and increase strength to improve the patients ability to perform daily activities with decreased pain and symptom levels      With   [] TE   [] TA   [] neuro   [] other: Patient Education: [x] Review HEP    [] Progressed/Changed HEP based on:   [] positioning   [] body mechanics   [] transfers   [] heat/ice application    [] other:      Other Objective/Functional Measures:   Increased rotation with plank leg lifts     Pain Level (0-10 scale) post treatment: 0    ASSESSMENT/Changes in Function: good tolerance to exercises with pt able to complete full push up and pull ups today without shoulder or elbow. Challenged with plank leg leg and arm lifts due to decreased  Core strength with cues to decrease trunk rotation. Added cone to low back to help cue form with quadruped crawl.      Patient will continue to benefit from skilled PT services to modify and progress therapeutic interventions, address functional mobility deficits, address strength deficits, analyze and cue movement patterns, analyze and modify body mechanics/ergonomics, assess and modify postural abnormalities and instruct in home and community integration to attain remaining goals. []  See Plan of Care  []  See progress note/recertification  []  See Discharge Summary         Progress towards goals / Updated goals:  Short Term Goals: STG- To be accomplished in 2 week(s):  1.  Pt will be independent with HEP to encourage prophylaxis. Eval: HEP dispensed   Current: compliance per pt report, updated with lat hang goal MET     Long Term Goals: LTG- To be accomplished in 6 week(s):  1.  Pt will report >/=75% improvement in symptoms to be able complete ADLs with less pain. Eval: 4/10 max pain in right shoulder and medial elbow with working out, reaching overhead and abduction  Last PN: 60% improvement, 2-3/10 max pain   Current:     2.  Pt will be able to complete pull ups and push ups without pain to demonstrate improved scapular strength  Eval:increased thoracic and lumbar extension, pain with both   Last PN: increased extension with pull ups in clinic, able to complete lat pull downs without pain  Current: no pain with pull up or pushup today goal MET     3.  Pt right shoulder IR AROM will improve to demonstrate improved shoulder and thoracic mobility. Eval: right 27* IR @ 90*, functional IR T12              Left 50* @ 90*, functional IR T7  Last PN:  Right IR at 90* 50*, functional IR T 10  - progressing   Current:     4.  Pt FOTO score will increase by >/=17 points to show improvement in subjective function.   Eval:57  Current:74 goal MET    PLAN  [x]  Upgrade activities as tolerated     [x]  Continue plan of care  []  Update interventions per flow sheet       []  Discharge due to:_  []  Other:_      Arnulfo Bean 9/4/2019  3:38 PM    Future Appointments   Date Time Provider [place] Tsai   9/4/2019  3:45 PM Arnulfo Bean THE [facility]   9/5/2019  7:30 AM Tavo King, PT, DPT [facility] THE [facility]

## 2019-09-05 ENCOUNTER — HOSPITAL ENCOUNTER (OUTPATIENT)
Dept: PHYSICAL THERAPY | Age: 49
Discharge: HOME | End: 2019-09-05
Payer: OTHER GOVERNMENT

## 2019-09-05 PROCEDURE — 97110 THERAPEUTIC EXERCISES: CPT

## 2019-09-05 NOTE — PROGRESS NOTES
PT DAILY TREATMENT NOTE    Patient Name: Ankit Pompa  Date:2019  : 1970  [x]  Patient  Verified  Payor:  / Plan: Lex Ordoñez 74 / Product Type:  /    In time:7:29 am  Out time:8:19 am  Total Treatment Time (min): 50  Total Timed Codes (min): 50  Visit #: 12 of 15    Treatment Area: Right shoulder pain [M25.511]    SUBJECTIVE  Pain Level (0-10 scale): 0  Any medication changes, allergies to medications, adverse drug reactions, diagnosis change, or new procedure performed?: [x] No    [] Yes (see summary sheet for update)  Subjective functional status/changes:   [] No changes reported  \"Fine. It is good. \"    OBJECTIVE    Modalities Rationale:     min [] Estim, type/location:                                      []  att     []  unatt     []  w/US     []  w/ice    []  w/heat    min []  Mechanical Traction: type/lbs                   []  pro   []  sup   []  int   []  cont    []  before manual    []  after manual    min []  Ultrasound, settings/location:      min []  Iontophoresis w/ dexamethasone, location:                                               []  take home patch       []  in clinic    min []  Ice     []  Heat    location/position:     min []  Vasopneumatic Device, press/temp:     min []  Other:    [] Skin assessment post-treatment (if applicable):    []  intact    []  redness- no adverse reaction     []redness - adverse reaction:          50 min Therapeutic Exercise:  [x] See flow sheet :   Rationale: increase ROM, increase strength, improve coordination and increase proprioception to improve the patients ability to perform daily activities with decreased pain and symptom levels            With   [] TE   [] TA   [] neuro   [] other: Patient Education: [x] Review HEP    [] Progressed/Changed HEP based on:   [] positioning   [] body mechanics   [] transfers   [] heat/ice application    [] other:      Other Objective/Functional Measures:   Substantial increase in lateral weight shift with all four reaching     Pain Level (0-10 scale) post treatment: 0    ASSESSMENT/Changes in Function:   Pt was very challenged with all four reaches and down dog. Also challenged with plank step around. Discussed with pt continuing 1 time per week over next 3 weeks to transition to gym exercise. Patient will continue to benefit from skilled PT services to modify and progress therapeutic interventions, address functional mobility deficits, address ROM deficits, address strength deficits, analyze and address soft tissue restrictions, analyze and cue movement patterns, analyze and modify body mechanics/ergonomics, assess and modify postural abnormalities and instruct in home and community integration to attain remaining goals. []  See Plan of Care  []  See progress note/recertification  []  See Discharge Summary         Progress towards goals / Updated goals:  Short Term Goals: STG- To be accomplished in 2 week(s):  1.  Pt will be independent with HEP to encourage prophylaxis. Eval: HEP dispensed   Current: compliance per pt report, updated with lat hang goal MET     Long Term Goals: LTG- To be accomplished in 6 week(s):  1.  Pt will report >/=75% improvement in symptoms to be able complete ADLs with less pain. Eval: 4/10 max pain in right shoulder and medial elbow with working out, reaching overhead and abduction  Last PN: 60% improvement, 2-3/10 max pain   Current:     2.  Pt will be able to complete pull ups and push ups without pain to demonstrate improved scapular strength  Eval:increased thoracic and lumbar extension, pain with both   Last PN: increased extension with pull ups in clinic, able to complete lat pull downs without pain  Current: no pain with pull up or pushup today goal MET     3.  Pt right shoulder IR AROM will improve to demonstrate improved shoulder and thoracic mobility.   Eval: right 27* IR @ 90*, functional IR T12              Left 50* @ 90*, functional IR T7  Last PN:  Right IR at 90* 50*, functional IR T 10  - progressing   Current:     4.  Pt FOTO score will increase by >/=17 points to show improvement in subjective function. Eval:57  Current:74 goal MET       PLAN  [x]  Upgrade activities as tolerated     []  Continue plan of care  []  Update interventions per flow sheet       []  Discharge due to:_  []  Other:_      Rosendo Longoria PT, DPT 9/5/2019  7:30 AM    No future appointments.

## 2019-09-06 ENCOUNTER — APPOINTMENT (OUTPATIENT)
Dept: PHYSICAL THERAPY | Age: 49
End: 2019-09-06
Payer: OTHER GOVERNMENT

## 2019-09-10 ENCOUNTER — APPOINTMENT (OUTPATIENT)
Dept: PHYSICAL THERAPY | Age: 49
End: 2019-09-10
Payer: OTHER GOVERNMENT

## 2019-09-18 ENCOUNTER — HOSPITAL ENCOUNTER (OUTPATIENT)
Dept: PHYSICAL THERAPY | Age: 49
Discharge: HOME | End: 2019-09-18
Payer: OTHER GOVERNMENT

## 2019-09-18 PROCEDURE — 97110 THERAPEUTIC EXERCISES: CPT

## 2019-09-18 NOTE — PROGRESS NOTES
PT DAILY TREATMENT NOTE    Patient Name: Valeria Edmonds  Date:2019  : 1970  [x]  Patient  Verified  Payor: ANTONY / Plan: Lex Ordoñez 74 / Product Type: [address] [place] [address] /    In time:10:00  Out time:10;50  Total Treatment Time (min): 50  Total Timed Codes (min): 50  1:1 Treatment Time ([address] only): 50   Visit #: 13 of 15    Treatment Area: Right shoulder pain [M25.511]    SUBJECTIVE  Pain Level (0-10 scale): 0  Any medication changes, allergies to medications, adverse drug reactions, diagnosis change, or new procedure performed?: [x] No    [] Yes (see summary sheet for update)  Subjective functional status/changes:   [] No changes reported  \"Back to doing pull ups and pushups without pain. Feel good. \"    OBJECTIVE    50 min Therapeutic Exercise:  [x] See flow sheet :   Rationale: increase ROM and increase strength to improve the patients ability to perform daily activities with decreased pain and symptom levels    With   [] TE   [] TA   [] neuro   [] other: Patient Education: [x] Review HEP    [] Progressed/Changed HEP based on:   [] positioning   [] body mechanics   [] transfers   [] heat/ice application    [] other:      Other Objective/Functional Measures:   Challenged with step arounds     Pain Level (0-10 scale) post treatment: 0    ASSESSMENT/Changes in Function: Pt reporting no longer having pain in elbow or shoulder with ability to participate fully in exercises classes. Plan to DC next visit due to progress towards goals. Patient will continue to benefit from skilled PT services to modify and progress therapeutic interventions, address functional mobility deficits, address ROM deficits, address strength deficits, analyze and cue movement patterns, analyze and modify body mechanics/ergonomics, assess and modify postural abnormalities and instruct in home and community integration to attain remaining goals.      []  See Plan of Care  []  See progress note/recertification  []  See Discharge Summary         Progress towards goals / Updated goals:  Short Term Goals: STG- To be accomplished in 2 week(s):  1.  Pt will be independent with HEP to encourage prophylaxis. Eval: HEP dispensed   Current: compliance per pt report, updated with lat hang goal MET     Long Term Goals: LTG- To be accomplished in 6 week(s):  1.  Pt will report >/=75% improvement in symptoms to be able complete ADLs with less pain. Eval: 4/10 max pain in right shoulder and medial elbow with working out, reaching overhead and abduction  Last PN: 60% improvement, 2-3/10 max pain   Current:pt able to workout without pain goal MET     2.  Pt will be able to complete pull ups and push ups without pain to demonstrate improved scapular strength  Eval:increased thoracic and lumbar extension, pain with both   Last PN: increased extension with pull ups in clinic, able to complete lat pull downs without pain  Current: no pain with pull up or pushup today goal MET     3.  Pt right shoulder IR AROM will improve to demonstrate improved shoulder and thoracic mobility. Eval: right 27* IR @ 90*, functional IR T12              Left 50* @ 90*, functional IR T7  Last PN:  Right IR at 90* 50*, functional IR T 10  - progressing   Current:     4.  Pt FOTO score will increase by >/=17 points to show improvement in subjective function.   Eval:57  Current:74 goal MET    PLAN  [x]  Upgrade activities as tolerated     [x]  Continue plan of care  []  Update interventions per flow sheet       []  Discharge due to:_  []  Other:_      [place] Record 9/18/2019  10:59 AM    Future Appointments   Date Time Provider [place] Tsai   9/19/2019  3:30 PM August Bean THE [facility]   9/25/2019  3:45 PM August Bean [facility]

## 2019-09-19 ENCOUNTER — HOSPITAL ENCOUNTER (OUTPATIENT)
Dept: PHYSICAL THERAPY | Age: 49
Discharge: HOME | End: 2019-09-19
Payer: OTHER GOVERNMENT

## 2019-09-19 PROCEDURE — 97140 MANUAL THERAPY 1/> REGIONS: CPT

## 2019-09-19 PROCEDURE — 97110 THERAPEUTIC EXERCISES: CPT

## 2019-09-19 NOTE — PROGRESS NOTES
[facility] at the [address], [place], [address] [facility]  Phone: [phone]      Fax:  [phone]    Discharge Summary    Patient name: Jael Hogue     Start of Care: 19  Referral source: Araseli Osborne MD    : 1970  Medical/Treatment Diagnosis: Right shoulder pain [M25.511]  Onset Date:weeks  Prior Hospitalization: see medical history   Provider#: [id number]  Comorbidities: unremarkable   Prior Level of Function:working out 5 days a week   Medications: Verified on Patient Summary List    Visits from Start of Care: 13    Missed Visits: 0  Reporting Period : 19 to 19    Short Term Goals: STG- To be accomplished in 2 week(s):  1.  Pt will be independent with HEP to encourage prophylaxis. Eval: HEP dispensed   Current: compliance per pt report, updated with lat hang goal MET     Long Term Goals: LTG- To be accomplished in 6 week(s):  1.  Pt will report >/=75% improvement in symptoms to be able complete ADLs with less pain. Eval: 4/10 max pain in right shoulder and medial elbow with working out, reaching overhead and abduction  Last PN: 60% improvement, 2-3/10 max pain   Current:pt able to workout without pain goal MET     2.  Pt will be able to complete pull ups and push ups without pain to demonstrate improved scapular strength  Eval:increased thoracic and lumbar extension, pain with both   Last PN: increased extension with pull ups in clinic, able to complete lat pull downs without pain  Current: no pain with pull up or pushup today goal MET     3.  Pt right shoulder IR AROM will improve to demonstrate improved shoulder and thoracic mobility.   Eval: right 27* IR @ 90*, functional IR T12              Left 50* @ 90*, functional IR T7  Last PN:  Right IR at 90* 50*, functional IR T 10    Current: 45* IR, functional T10 - almost MET     4.  Pt FOTO score will increase by >/=17 points to show improvement in subjective function. Eval:57  Current:74 goal MET    Assessment/ Summary of Care: Pt presented to therapy with c/c right shoulder and elbow pain. Pt attended 14 sessions including initial eval with making great progress towards goals. Pt now able to workout including push ups and pul ups without pain. IR ROM has improved hoewver still limited. Pt is ready to be discharged at this time due to progress towards gaols and able to return to gym unrestricted without pain.      RECOMMENDATIONS:  [x]Discontinue therapy: [x]Patient has reached or is progressing toward set goals      []Patient is non-compliant or has abdicated      []Due to lack of appreciable progress towards set goals    Elke Cabrera Remesic 9/19/2019 2:41 PM

## 2019-09-19 NOTE — PROGRESS NOTES
PT DAILY TREATMENT NOTE    Patient Name: Sedrick Lucas  Date:2019  : 1970  [x]  Patient  Verified  Payor:  / Plan: Lex Ordoñez 74 / Product Type:  /    In time:2:40  Out time:3:25  Total Treatment Time (min): 45  Total Timed Codes (min): 45  1:1 Treatment Time ( [address] only): 45   Visit #: 14 of 15    Treatment Area: Right shoulder pain [M25.511]    SUBJECTIVE  Pain Level (0-10 scale): 0  Any medication changes, allergies to medications, adverse drug reactions, diagnosis change, or new procedure performed?: [x] No    [] Yes (see summary sheet for update)  Subjective functional status/changes:   [] No changes reported  \"great. \"    OBJECTIVE        37 min Therapeutic Exercise:  [x] See flow sheet :   Rationale: increase ROM and increase strength to improve the patients ability to perform daily activities with decreased pain and symptom levels    8 min Manual Therapy:  pec stretch, right apical expansion    Rationale: decrease pain, increase ROM and increase tissue extensibility to improve the patients ability to perform daily activities with decreased pain and symptom levels    With   [] TE   [] TA   [] neuro   [] other: Patient Education: [x] Review HEP    [] Progressed/Changed HEP based on:   [] positioning   [] body mechanics   [] transfers   [] heat/ice application    [] other:      Other Objective/Functional Measures:   See updated goals below     Pain Level (0-10 scale) post treatment: 0    ASSESSMENT/Changes in Function:  Pt presented to therapy with c/c right shoulder and elbow pain. Pt attended 14 sessions including initial eval with making great progress towards goals. Pt now able to workout including push ups and pul ups without pain. IR ROM has improved hoewver still limited. Pt is ready to be discharged at this time due to progress towards gaols and able to return to gym unrestricted without pain.      Patient will continue to benefit from skilled PT services to modify and progress therapeutic interventions, address functional mobility deficits, address ROM deficits, address strength deficits, analyze and address soft tissue restrictions, analyze and modify body mechanics/ergonomics, assess and modify postural abnormalities and instruct in home and community integration to attain remaining goals. []  See Plan of Care  []  See progress note/recertification  []  See Discharge Summary         Progress towards goals / Updated goals:  Short Term Goals: STG- To be accomplished in 2 week(s):  1.  Pt will be independent with HEP to encourage prophylaxis. Eval: HEP dispensed   Current: compliance per pt report, updated with lat hang goal MET     Long Term Goals: LTG- To be accomplished in 6 week(s):  1.  Pt will report >/=75% improvement in symptoms to be able complete ADLs with less pain. Eval: 4/10 max pain in right shoulder and medial elbow with working out, reaching overhead and abduction  Last PN: 60% improvement, 2-3/10 max pain   Current:pt able to workout without pain goal MET     2.  Pt will be able to complete pull ups and push ups without pain to demonstrate improved scapular strength  Eval:increased thoracic and lumbar extension, pain with both   Last PN: increased extension with pull ups in clinic, able to complete lat pull downs without pain  Current: no pain with pull up or pushup today goal MET     3.  Pt right shoulder IR AROM will improve to demonstrate improved shoulder and thoracic mobility. Eval: right 27* IR @ 90*, functional IR T12              Left 50* @ 90*, functional IR T7  Last PN:  Right IR at 90* 50*, functional IR T 10    Current: 45* IR, functional T10 - almost MET     4.  Pt FOTO score will increase by >/=17 points to show improvement in subjective function.   Eval:57  Current:74 goal MET    PLAN  []  Upgrade activities as tolerated     []  Continue plan of care  []  Update interventions per flow sheet       [x]  Discharge due to: meeting goals []  Other:_      Maria Luz Read Remesic 9/19/2019  3:36 PM    No future appointments.

## 2019-09-25 ENCOUNTER — APPOINTMENT (OUTPATIENT)
Dept: PHYSICAL THERAPY | Age: 49
End: 2019-09-25
Payer: OTHER GOVERNMENT

## 2020-02-05 ENCOUNTER — HOSPITAL ENCOUNTER (OUTPATIENT)
Dept: PHYSICAL THERAPY | Age: 50
Discharge: HOME | End: 2020-02-05
Payer: OTHER GOVERNMENT

## 2020-02-05 PROCEDURE — 97110 THERAPEUTIC EXERCISES: CPT

## 2020-02-05 PROCEDURE — 97161 PT EVAL LOW COMPLEX 20 MIN: CPT

## 2020-02-05 PROCEDURE — 97140 MANUAL THERAPY 1/> REGIONS: CPT

## 2020-02-05 NOTE — PROGRESS NOTES
[facility] at the [address], [place], [address] [facility]  Phone: [phone]      Fax:  [phone]       Plan of Care/ Statement of Necessity for Physical Therapy Services      Patient name: Reford Collet Start of Care: 2020   Referral source: Selina Avila MD : 1970    Medical Diagnosis: Right shoulder pain [M25.511]  Pain in right arm [M79.601]   Onset Date:3 weeks    Treatment Diagnosis: right shoulder pain   Prior Hospitalization: see medical history Provider#: [id number]   Medications: Verified on Patient summary List    Comorbidities: bilateral knee scope   Prior Level of Function: working out 5-6days a week       The Plan of Care and following information is based on the information from the initial evaluation. Assessment/ key information: Pt is a 52yo male presenting to therapy with c/c right anterior and lateral shoulder pain 3 weeks ago with insidious onset. Pt was in therapy last year for shoulder with good results however pain has returned. Pain increases with pullups, reaching overhead and abduction. Denies red flags and radicular symptoms at this time. Pt demonstrates decreased shoulder AROM with increased thoracic extension, decreased rib mobility bilaterally with flattended thoracic spine noted, WFL UE strength however pain with abduction, positive Neers and TTP over pecs, subscap and lats on right. Signs and symptoms consistent with mechanical right shoulder pain pain.  Pt would benefit from skilled PT services to address the above impairments to allow pt to return to working out without shoulder pain.      Evaluation Complexity History MEDIUM  Complexity : 1-2 comorbidities / personal factors will impact the outcome/ POC ; Examination MEDIUM Complexity : 3 Standardized tests and measures addressing body structure, function, activity limitation and / or participation in recreation  ;Presentation LOW Complexity : Stable, uncomplicated  ;Clinical Decision Making MEDIUM Complexity : FOTO score of 26-74  Overall Complexity Rating: LOW   Problem List: pain affecting function, decrease ROM, decrease strength, decrease ADL/ functional abilitiies, decrease activity tolerance, decrease flexibility/ joint mobility and decrease transfer abilities   Treatment Plan may include any combination of the following: Therapeutic exercise, Therapeutic activities, Neuromuscular re-education, Physical agent/modality, Gait/balance training, Manual therapy, Patient education, Self Care training, Functional mobility training, Home safety training and Stair training  Patient / Family readiness to learn indicated by: asking questions, trying to perform skills and interest  Persons(s) to be included in education: patient (P)  Barriers to Learning/Limitations: None  Patient Goal (s): workout pain free. \"  Patient Self Reported Health Status: good  Rehabilitation Potential: good    Short Term Goals: STG- To be accomplished in 2 week(s):  1. Pt will be independent with HEP to encourage prophylaxis. Eval:HEP dispensed      Long Term Goals: LTG- To be accomplished in 6 week(s):  1. Pt will demonstrate ability to complete pull ups without right shoulder pain to demonstrate improved scapular strength. Eval:pain with 1 pullup, increased thoracic extension and rib flare, challenged with PPT     2.  Pt trunk rotation will improve to at least 15in to demonstrate improvef functional thoracic mobility  Eval: 17in right, 15.5in left      3. Pt right shoulder flexion and IR AROM  will improve to [facility] to allow pt to complete ADLs with increased ease. Eval: 134* flexion ,25* IR     4. Pt FOTO score will increase by >/ = 8 points to show improvement in subjective function. Eval:72  Current: will address at visit 5      Frequency / Duration: Patient to be seen 2 times per week for 6 weeks.     Patient/ Caregiver education and instruction: Diagnosis, prognosis, self care, activity modification and exercises   [x]  Plan of care has been reviewed with SHAN STERLING Remesic 2/5/2020 2:45 PM  _____________________________________________________________________  I certify that the above Therapy Services are being furnished while the patient is under my care. I agree with the treatment plan and certify that this therapy is necessary.     Physician's Signature:____________Date:_________TIME:________    [employer], Date and Time must be completed for valid certification **    Please sign and return to [facility] at the [address], [address] [facility]       Phone: [phone]      Fax:  [phone]

## 2020-02-11 ENCOUNTER — HOSPITAL ENCOUNTER (OUTPATIENT)
Dept: PHYSICAL THERAPY | Age: 50
Discharge: HOME | End: 2020-02-11
Payer: OTHER GOVERNMENT

## 2020-02-11 PROCEDURE — 97140 MANUAL THERAPY 1/> REGIONS: CPT

## 2020-02-11 PROCEDURE — 97110 THERAPEUTIC EXERCISES: CPT

## 2020-02-11 NOTE — PROGRESS NOTES
PT DAILY TREATMENT NOTE    Patient Name: Karla Ricardo  Date:2020  : 1970  [x]  Patient  Verified  Payor:  / Plan: Lex Ordoñez 74 / Product Type:  /    In time:9:58  Out time:10:57  Total Treatment Time (min): 59  Total Timed Codes (min): 59  1:1 Treatment Time ( only): 61   Visit #: 2 of 12    Treatment Area: Right shoulder pain [M25.511]  Pain in right arm [M79.601]    SUBJECTIVE  Pain Level (0-10 scale): 0  Any medication changes, allergies to medications, adverse drug reactions, diagnosis change, or new procedure performed?: [x] No    [] Yes (see summary sheet for update)  Subjective functional status/changes:   [] No changes reported  \"Better. \"    OBJECTIVE      47 min Therapeutic Exercise:  [x] See flow sheet :   Rationale: increase ROM and increase strength to improve the patients ability to perform daily activities with decreased pain and symptom levels    12 min Manual Therapy:  Cupping to thoracic paraspinals with QP, right lats with s/l flexion, rib mobs with breaths    Rationale: decrease pain, increase ROM and increase tissue extensibility to improve the patients ability to perform daily activities with decreased pain and symptom levels          With   [] TE   [] TA   [] neuro   [] other: Patient Education: [x] Review HEP    [] Progressed/Changed HEP based on:   [] positioning   [] body mechanics   [] transfers   [] heat/ice application    [] other:      Other Objective/Functional Measures:   LORA Special Tests (pre/post)  HGIR:                                                 Right: 25*/44*             Left: 50*/52*  Trunk Rot                                           Right: 16.5in   Left 15in  Shoulder Horizontal Abduction          Right: + /-             Left: + /-    Pain Level (0-10 scale) post treatment: 0    ASSESSMENT/Changes in Function: Good tolerance to exercises with challenged engaging left obliques. Improved shoulder IR at end of session. Patient will continue to benefit from skilled PT services to modify and progress therapeutic interventions, address functional mobility deficits, address ROM deficits, address strength deficits, analyze and address soft tissue restrictions, analyze and cue movement patterns, analyze and modify body mechanics/ergonomics, assess and modify postural abnormalities and instruct in home and community integration to attain remaining goals. []  See Plan of Care  []  See progress note/recertification  []  See Discharge Summary         Progress towards goals / Updated goals:  Short Term Goals: STG- To be accomplished in 2 week(s):  1.  Pt will be independent with HEP to encourage prophylaxis. Eval:HEP dispensed   Current: compliance, updated today      Long Term Goals: LTG- To be accomplished in 6 week(s):  1.  Pt will demonstrate ability to complete pull ups without right shoulder pain to demonstrate improved scapular strength. Eval:pain with 1 pullup, increased thoracic extension and rib flare, challenged with PPT     2.  Pt trunk rotation will improve to at least 15in to demonstrate improvef functional thoracic mobility  Eval: 17in right, 15.5in left   Current: 16.5in right, 15in left      3.  Pt right shoulder flexion and IR AROM  will improve to [facility] allow pt to complete ADLs with increased ease. Eval: 134* flexion ,25* IR  Current: 44* right IR      4.  Pt FOTO score will increase by >/ = 8 points to show improvement in subjective function.   Eval:72  Current: will address at visit 5    PLAN  [x]  Upgrade activities as tolerated     [x]  Continue plan of care  []  Update interventions per flow sheet       []  Discharge due to:_  []  Other:_      Jorene Schaumann Remesic 2/11/2020  10:11 AM    Future Appointments   Date Time Provider [place]   2/19/2020 11:30 AM Angela Cox PT SAJI THE [facility]   2/21/2020 10:15 AM Remesic, Jorene Schaumann MIHPTBW THE [facility]   3/3/2020  1:30 PM Remesic, Jorene Schaumann MIHPTBW THE [facility]   3/5/2020 12:00 PM Jaimee Bean THE [facility]

## 2020-02-19 ENCOUNTER — HOSPITAL ENCOUNTER (OUTPATIENT)
Dept: PHYSICAL THERAPY | Age: 50
Discharge: HOME | End: 2020-02-19
Payer: OTHER GOVERNMENT

## 2020-02-19 PROCEDURE — 97140 MANUAL THERAPY 1/> REGIONS: CPT

## 2020-02-19 PROCEDURE — 97110 THERAPEUTIC EXERCISES: CPT

## 2020-02-19 NOTE — PROGRESS NOTES
PT DAILY TREATMENT NOTE    Patient Name: Reford Collet  Date:2020  : 1970  [x]  Patient  Verified  Payor:  / Plan: Lex Ordoñez 74 / Product Type:  /    In time:11:25  Out time:12:30  Total Treatment Time (min): 65  Total Timed Codes (min): 65  1:1 Treatment Time ( [address] only): 65   Visit #: 3 of 12    Treatment Area: Right shoulder pain [M25.511]  Pain in right arm [M79.601]    SUBJECTIVE  Pain Level (0-10 scale): 0  Any medication changes, allergies to medications, adverse drug reactions, diagnosis change, or new procedure performed?: [x] No    [] Yes (see summary sheet for update)  Subjective functional status/changes:   [] No changes reported  \"I tried a pull up this weekend and it hurt my elbow. \"    OBJECTIVE      55 min Therapeutic Exercise:  [x] See flow sheet :   Rationale: increase ROM and increase strength to improve the patients ability to perform daily activities with decreased pain and symptom levels      10 min Manual Therapy:  Cupping to right lats with active shoulder flexion in s/l, rhomboids with QP   Rationale: decrease pain, increase ROM and increase tissue extensibility to improve the patients ability to perform daily activities with decreased pain and symptom levels    With   [] TE   [] TA   [] neuro   [] other: Patient Education: [x] Review HEP    [] Progressed/Changed HEP based on:   [] positioning   [] body mechanics   [] transfers   [] heat/ice application    [] other:      Other Objective/Functional Measures:   LORA Special Tests (pre/post)  HGIR:                                                 Right: 40*/45*             Left: 60*/60*  Shoulder Flexion   Right: 140*/145*             Left: 155*  Trunk Rot                                           Right: 16.5in    Left 15.5in   Shoulder Horizontal Abduction          Right: + /- however pain            Left: + /-       Pain Level (0-10 scale) post treatment: 0    ASSESSMENT/Changes in Function: Pt continues to have challenge decreasing rib flare with exercises. Right shoulder ROM is improving each visit however still painful with abduction. Patient will continue to benefit from skilled PT services to modify and progress therapeutic interventions, address functional mobility deficits, address ROM deficits, address strength deficits, analyze and address soft tissue restrictions, analyze and cue movement patterns, analyze and modify body mechanics/ergonomics, assess and modify postural abnormalities and instruct in home and community integration to attain remaining goals. []  See Plan of Care  []  See progress note/recertification  []  See Discharge Summary         Progress towards goals / Updated goals:  Short Term Goals: STG- To be accomplished in 2 week(s):  1.  Pt will be independent with HEP to encourage prophylaxis. Eval:HEP dispensed   Current: compliance, updated today      Long Term Goals: LTG- To be accomplished in 6 week(s):  1.  Pt will demonstrate ability to complete pull ups without right shoulder pain to demonstrate improved scapular strength. Eval:pain with 1 pullup, increased thoracic extension and rib flare, challenged with PPT     2.  Pt trunk rotation will improve to at least 15in to demonstrate improvef functional thoracic mobility  Eval: 17in right, 15.5in left   Current: 16.5in right, 15in left      3.  Pt right shoulder flexion and IR AROM  will improve to [facility] allow pt to complete ADLs with increased ease. Eval: 134* flexion ,25* IR  Current: 45* IR, 145* flexion progressing 2/19/20     4.  Pt FOTO score will increase by >/ = 8 points to show improvement in subjective function.   Eval:72  Current: will address at visit 5     PLAN  [x]  Upgrade activities as tolerated     [x]  Continue plan of care  []  Update interventions per flow sheet       []  Discharge due to:_  []  Other:_      Lysle Bio 2/19/2020  2:11 PM    Future Appointments   Date Time Provider Department [place]   2/21/2020 10:15 AM Philomena Bean THE [facility]   3/3/2020  1:30 PM Philomena Bean THE [facility]   3/5/2020 12:00 PM Philomena Bena THE [facility]

## 2020-02-21 ENCOUNTER — HOSPITAL ENCOUNTER (OUTPATIENT)
Dept: PHYSICAL THERAPY | Age: 50
Discharge: HOME | End: 2020-02-21
Payer: OTHER GOVERNMENT

## 2020-02-21 PROCEDURE — 97140 MANUAL THERAPY 1/> REGIONS: CPT

## 2020-02-21 PROCEDURE — 97110 THERAPEUTIC EXERCISES: CPT

## 2020-02-21 NOTE — PROGRESS NOTES
PT DAILY TREATMENT NOTE    Patient Name: Stephon Garrett  Date:2020  : 1970  [x]  Patient  Verified  Payor:  / Plan: Lex Ordoñez 74 / Product Type:  /    In time:10:07  Out time:11:05  Total Treatment Time (min): 58  Total Timed Codes (min): 58  1:1 Treatment Time ([facility] only): 62   Visit #: 4 of 12    Treatment Area: Right shoulder pain [M25.511]  Pain in right arm [M79.601]    SUBJECTIVE  Pain Level (0-10 scale): 0  Any medication changes, allergies to medications, adverse drug reactions, diagnosis change, or new procedure performed?: [x] No    [] Yes (see summary sheet for update)  Subjective functional status/changes:   [] No changes reported  \"I'm okay. \"    OBJECTIVE      50 min Therapeutic Exercise:  [x] See flow sheet :   Rationale: increase ROM and increase strength to improve the patients ability to perform daily activities with decreased pain and symptom levels    8 min Manual Therapy:  Ribs mobs with breaths, right apical expansion    Rationale: decrease pain, increase ROM and increase tissue extensibility to improve the patients ability to perform daily activities with decreased pain and symptom levels          With   [] TE   [] TA   [] neuro   [] other: Patient Education: [x] Review HEP    [] Progressed/Changed HEP based on:   [] positioning   [] body mechanics   [] transfers   [] heat/ice application    [] other:      Other Objective/Functional Measures:   LORA Special Tests (pre/post)  HGIR:                                                 Right: 20*/45*             Left: 55*/60*  Shoulder Flexion   Right: 124*/140*             Left: 130*/152*  Trunk Rot                                           Right: 16in/15.5in    Left 15.5in /15.5in  Shoulder Horizontal Abduction          Right: 27* /40*             Left: 30* /45*     Pain Level (0-10 scale) post treatment: 0    ASSESSMENT/Changes in Function: Improved abiltiy to find abd today with standing reach at wall however challenged with decreased neck use with inhalation. Able to decreased pain with lat pulls with scap depression. Patient will continue to benefit from skilled PT services to modify and progress therapeutic interventions, address functional mobility deficits, address ROM deficits, address strength deficits, analyze and address soft tissue restrictions, analyze and cue movement patterns, analyze and modify body mechanics/ergonomics, assess and modify postural abnormalities and instruct in home and community integration to attain remaining goals. []  See Plan of Care  []  See progress note/recertification  []  See Discharge Summary         Progress towards goals / Updated goals:  Short Term Goals: STG- To be accomplished in 2 week(s):  1.  Pt will be independent with HEP to encourage prophylaxis. Eval:HEP dispensed   Current: compliance, updated today goal MET     Long Term Goals: LTG- To be accomplished in 6 week(s):  1.  Pt will demonstrate ability to complete pull ups without right shoulder pain to demonstrate improved scapular strength. Eval:pain with 1 pullup, increased thoracic extension and rib flare, challenged with PPT  Current: challenged with maintaining PPT with exercises      2.  Pt trunk rotation will improve to at least 15in to demonstrate improvef functional thoracic mobility  Eval: 17in right, 15.5in left   Current: 15.5in right, 15in left progressing well      3.  Pt right shoulder flexion and IR AROM  will improve to [facility] allow pt to complete ADLs with increased ease. Eval: 134* flexion ,25* IR  Current: 45* IR, 145* flexion progressing 2/19/20     4.  Pt FOTO score will increase by >/ = 8 points to show improvement in subjective function.   Eval:72  Current: will address at visit 5     PLAN  [x]  Upgrade activities as tolerated     [x]  Continue plan of care  []  Update interventions per flow sheet       []  Discharge due to:_  []  Other:_      Yenifer Quivers 2/21/2020  10:45 AM    Future Appointments   Date Time Provider [place] Tsai   3/3/2020  1:30 PM Skylar Bean THE [facility]   3/5/2020 12:00 PM Skylar Bean THE [facility]

## 2020-02-24 ENCOUNTER — APPOINTMENT (OUTPATIENT)
Dept: PHYSICAL THERAPY | Age: 50
End: 2020-02-24
Payer: OTHER GOVERNMENT

## 2020-02-26 ENCOUNTER — APPOINTMENT (OUTPATIENT)
Dept: PHYSICAL THERAPY | Age: 50
End: 2020-02-26
Payer: OTHER GOVERNMENT

## 2020-02-28 ENCOUNTER — HOSPITAL ENCOUNTER (OUTPATIENT)
Dept: PHYSICAL THERAPY | Age: 50
Discharge: HOME | End: 2020-02-28
Payer: OTHER GOVERNMENT

## 2020-02-28 PROCEDURE — 97110 THERAPEUTIC EXERCISES: CPT

## 2020-02-28 NOTE — PROGRESS NOTES
PT DAILY TREATMENT NOTE    Patient Name: Chepe Morgan  Date:2020  : 1970  [x]  Patient  Verified  Payor:  / Plan: Lex Ordoñez 74 / Product Type:  /    In time:3:10  Out time:4:05  Total Treatment Time (min): 55  Total Timed Codes (min): 55  1:1 Treatment Time ( [address] only): 54   Visit #: 5 of 12    Treatment Area: Right shoulder pain [M25.511]  Pain in right arm [M79.601]    SUBJECTIVE  Pain Level (0-10 scale): 0  Any medication changes, allergies to medications, adverse drug reactions, diagnosis change, or new procedure performed?: [x] No    [] Yes (see summary sheet for update)  Subjective functional status/changes:   [] No changes reported  \"I was able to do the lat pulls without pain and felt it in my lats this time. \"    OBJECTIVE      55 min Therapeutic Exercise:  [x] See flow sheet :   Rationale: increase ROM and increase strength to improve the patients ability to perform daily activities with decreased pain and symptom levels      With   [] TE   [] TA   [] neuro   [] other: Patient Education: [x] Review HEP    [] Progressed/Changed HEP based on:   [] positioning   [] body mechanics   [] transfers   [] heat/ice application    [] other:      Other Objective/Functional Measures:   LORA Special Tests (pre/post)  HGIR:                                                 Right: 40*/60*           Left: 60*/70*  Shoulder Flexion   Right: 140*/150*             Left: 150*/155*  Trunk Rot                                           Right: 15.5in    Left 15in   Shoulder Horizontal Abduction          Right:+ /-          Left: + / - pain     Pain Level (0-10 scale) post treatment: 0    ASSESSMENT/Changes in Function: Right shoulder ROM is improving with less pain with ability to complete lat pull downs without pain this week. Pt very challenged with maintaining stability with down dog taps with cues to decrease pelvic rotation.      Patient will continue to benefit from skilled PT services to modify and progress therapeutic interventions, address functional mobility deficits, address ROM deficits, address strength deficits, analyze and address soft tissue restrictions, analyze and cue movement patterns, analyze and modify body mechanics/ergonomics, assess and modify postural abnormalities and instruct in home and community integration to attain remaining goals. []  See Plan of Care  []  See progress note/recertification  []  See Discharge Summary         Progress towards goals / Updated goals:  Short Term Goals: STG- To be accomplished in 2 week(s):  1.  Pt will be independent with HEP to encourage prophylaxis. Eval:HEP dispensed   Current: compliance, updated today goal MET     Long Term Goals: LTG- To be accomplished in 6 week(s):  1.  Pt will demonstrate ability to complete pull ups without right shoulder pain to demonstrate improved scapular strength. Eval:pain with 1 pullup, increased thoracic extension and rib flare, challenged with PPT  Current: challenged with maintaining PPT with exercises      2.  Pt trunk rotation will improve to at least 15in to demonstrate improvef functional thoracic mobility  Eval: 17in right, 15.5in left   Current: 15.5in right, 15in left progressing well      3.  Pt right shoulder flexion and IR AROM  will improve to [facility] allow pt to complete ADLs with increased ease. Eval: 134* flexion ,25* IR  Current: 60* IR, 150* flexion progressing 2/28/20     4.  Pt FOTO score will increase by >/ = 8 points to show improvement in subjective function.   Eval:72  Current: will address at visit 6       PLAN  [x]  Upgrade activities as tolerated     [x]  Continue plan of care  []  Update interventions per flow sheet       []  Discharge due to:_  []  Other:_      Shawn Bean 2/28/2020  3:26 PM    Future Appointments   Date Time Provider [place] Tsai   3/3/2020  1:30 PM Shawn Bean THE [facility]   3/5/2020 12:00 PM Shawn Bean THE [facility]

## 2020-03-03 ENCOUNTER — HOSPITAL ENCOUNTER (OUTPATIENT)
Dept: PHYSICAL THERAPY | Age: 50
Discharge: HOME | End: 2020-03-03
Payer: OTHER GOVERNMENT

## 2020-03-03 PROCEDURE — 97110 THERAPEUTIC EXERCISES: CPT

## 2020-03-03 NOTE — PROGRESS NOTES
PT DAILY TREATMENT NOTE    Patient Name: Luis Miguel Montgomery  Date:3/3/2020  : 1970  [x]  Patient  Verified  Payor:  / Plan: Soni Nguyen / Product Type:  /    In time:1:25  Out time:2:20  Total Treatment Time (min): 55  Total Timed Codes (min): 55  1:1 Treatment Time ( [address] only): 54   Visit #: 6 of 12    Treatment Area: Right shoulder pain [M25.511]  Pain in right arm [M79.601]    SUBJECTIVE  Pain Level (0-10 scale): 0  Any medication changes, allergies to medications, adverse drug reactions, diagnosis change, or new procedure performed?: [x] No    [] Yes (see summary sheet for update)  Subjective functional status/changes:   [] No changes reported  \"I'm sore from doing the exercises this weekend. \"    OBJECTIVE      55 min Therapeutic Exercise:  [x] See flow sheet :   Rationale: increase ROM and increase strength to improve the patients ability to perform daily activities with decreased pain and symptom levels    With   [] TE   [] TA   [] neuro   [] other: Patient Education: [x] Review HEP    [] Progressed/Changed HEP based on:   [] positioning   [] body mechanics   [] transfers   [] heat/ice application    [] other:      Other Objective/Functional Measures:    LORA Special Tests (pre/post)  HGIR:                                                 Right: 40*/50*             Left: 60*  Shoulder Flexion   Right: 150*/160*             Left: 160*  Trunk Rot                                           Right: 15in    Left 15in  Shoulder Horizontal Abduction          Right: + /-             Left: - /-        Pain Level (0-10 scale) post treatment: 0    ASSESSMENT/Changes in Function: good tolerance to exercises with no pain however continued decreased abdominal strength. Unable to blow up balloon with standing reach due to increased neck compensation. Pt with continued trunk rotation as well with downdog position and renegrade rows today.      Patient will continue to benefit from skilled PT services to modify and progress therapeutic interventions, address functional mobility deficits, address ROM deficits, address strength deficits, analyze and address soft tissue restrictions, analyze and cue movement patterns, analyze and modify body mechanics/ergonomics, assess and modify postural abnormalities and instruct in home and community integration to attain remaining goals. []  See Plan of Care  []  See progress note/recertification  []  See Discharge Summary         Progress towards goals / Updated goals:  Short Term Goals: STG- To be accomplished in 2 week(s):  1.  Pt will be independent with HEP to encourage prophylaxis. Eval:HEP dispensed   Current: compliance, updated today goal MET     Long Term Goals: LTG- To be accomplished in 6 week(s):  1.  Pt will demonstrate ability to complete pull ups without right shoulder pain to demonstrate improved scapular strength. Eval:pain with 1 pullup, increased thoracic extension and rib flare, challenged with PPT  Current: challenged with maintaining PPT with exercises      2.  Pt trunk rotation will improve to at least 15in to demonstrate improvef functional thoracic mobility  Eval: 17in right, 15.5in left   Current: 15.5n right, 15in left goal MET     3.  Pt right shoulder flexion and IR AROM  will improve to [facility] allow pt to complete ADLs with increased ease. Eval: 134* flexion ,25* IR  Current: 60* IR, 150* flexion progressing 2/28/20     4.  Pt FOTO score will increase by >/ = 8 points to show improvement in subjective function.   Eval:72  Current: 78 progressing 3/3/20    PLAN  [x]  Upgrade activities as tolerated     [x]  Continue plan of care  []  Update interventions per flow sheet       []  Discharge due to:_  []  Other:_      Johnathan Milks 3/3/2020  1:28 PM    Future Appointments   Date Time Provider [place] Tsai   3/3/2020  1:30 PM Km Bean THE [facility]   3/5/2020 12:00 PM Km Bean THE [facility]

## 2020-03-05 ENCOUNTER — HOSPITAL ENCOUNTER (OUTPATIENT)
Dept: PHYSICAL THERAPY | Age: 50
Discharge: HOME | End: 2020-03-05
Payer: OTHER GOVERNMENT

## 2020-03-05 PROCEDURE — 97140 MANUAL THERAPY 1/> REGIONS: CPT

## 2020-03-05 PROCEDURE — 97110 THERAPEUTIC EXERCISES: CPT

## 2020-03-05 NOTE — PROGRESS NOTES
[facility] at the [address], [place], [address] [facility]  Phone: [phone]      Fax:  [phone]    Progress Note  Patient name: Ryne Vieyra Start of Care: 2020   Referral source: Paulo Arce MD : 1970               Medical Diagnosis: Right shoulder pain [M25.511]  Pain in right arm [M79.601]    Onset Date:3 weeks               Treatment Diagnosis: right shoulder pain   Prior Hospitalization: see medical history Provider#: [id number]   Medications: Verified on Patient summary List    Comorbidities: bilateral knee scope   Prior Level of Function: working out 5-6days a week     Visits from Start of Care: 7    Missed Visits: 0    Progress Towards Goals:   Short Term Goals: STG- To be accomplished in 2 week(s):  1.  Pt will be independent with HEP to encourage prophylaxis. Eval:HEP dispensed   Current: compliance, updated today goal MET     Long Term Goals: LTG- To be accomplished in 6 week(s):  1.  Pt will demonstrate ability to complete pull ups without right shoulder pain to demonstrate improved scapular strength. Eval:pain with 1 pullup, increased thoracic extension and rib flare, challenged with PPT  Current: challenged with maintaining PPT with exercises, unable to complete pull ups without pain however able to engage lats with lat pull down now progressing      2.  Pt trunk rotation will improve to at least 15in to demonstrate improvef functional thoracic mobility  Eval: 17in right, 15.5in left   Current: 15.5n right, 15in left almost MET     3.  Pt right shoulder flexion and IR AROM  will improve to [facility] allow pt to complete ADLs with increased ease. Eval: 134* flexion ,25* IR  Current: 60* IR, 150* flexion progressing      4.  Pt FOTO score will increase by >/ = 8 points to show improvement in subjective function.   Eval:72  Current: 78 progressing         Key Functional Changes: Pt presented to therapy with c/c right anterior/lateral shoulder pain ongoing for months. Pt is making progress towards goals with overall ROM improving however continued decreased serratus and scapular strength noted with exercises with challenge decreasing pec and UT compensation. Pt continues to have pain with push ups and pull ups in shoulder due to poor mechanics and form with increased thoracic and lumbar extension. Pt would benefit from skilled PT services to address remaining unmet goals. Updated Goals: to be achieved in 4 weeks:   See goals above    ASSESSMENT/RECOMMENDATIONS:  [x]Continue therapy per initial plan/protocol at a frequency of  2 x per week for 4 weeks  []Continue therapy with the following recommended changes:_____________________      _____________________________________________________________________  []Discontinue therapy progressing towards or have reached established goals  []Discontinue therapy due to lack of appreciable progress towards goals  []Discontinue therapy due to lack of attendance or compliance  []Await Physician's recommendations/decisions regarding therapy  []Other:________________________________________________________________    Thank you for this referral.   Kana See Remesic 3/5/2020 1:58 PM  NOTE TO PHYSICIAN:  [address]   FAX TO [facility]: [phone]  If you are unable to process this request in 24 hours please contact our office: [phone]        []  I have read the above report and request that my patient continue as recommended. []  I have read the above report and request that my patient continue therapy with the following changes/special instructions:________________________________________  []I have read the above report and request that my patient be discharged from therapy.     [de-identified] Signature:____________Date:_________TIME:________    [facility], Date and Time must be completed for valid certification **

## 2020-03-16 ENCOUNTER — HOSPITAL ENCOUNTER (OUTPATIENT)
Dept: PHYSICAL THERAPY | Age: 50
Discharge: HOME | End: 2020-03-16
Payer: OTHER GOVERNMENT

## 2020-03-16 PROCEDURE — 97112 NEUROMUSCULAR REEDUCATION: CPT

## 2020-03-16 PROCEDURE — 97140 MANUAL THERAPY 1/> REGIONS: CPT

## 2020-03-16 NOTE — PROGRESS NOTES
PT DAILY TREATMENT NOTE    Patient Name: Berry Mensah  Date:3/16/2020  : 1970  [x]  Patient  Verified  Payor:  / Plan: Lex Ordoñez 74 / Product Type:  /    In time:310  Out time:355  Total Treatment Time (min): 45  Total Timed Codes (min): 45  1:1 Treatment Time ([facility] only): 45    Visit #: 8 of 12    Treatment Area: Right shoulder pain [M25.511]  Pain in right arm [M79.601]    SUBJECTIVE  Pain Level (0-10 scale): 0  Any medication changes, allergies to medications, adverse drug reactions, diagnosis change, or new procedure performed?: [x] No    [] Yes (see summary sheet for update)  Subjective functional status/changes:   [] No changes reported  Reports LB tightness after hiking 5 days on [place] last week    OBJECTIVE           35 min Neuromuscular Re-education:  [x]  See flow sheet :LORA progression with focus on rib cage mobility   Rationale: increase ROM, increase strength, improve coordination and increase proprioception  to improve the patients ability to return to PLOF    10 min Manual Therapy:  LORA MT for improved rib cage mobility, pec min flexibility, improved breathing pattern    Rationale: increase ROM and increase tissue extensibility to improve trunk position         With   [] TE   [] TA   [x] neuro   [] other: Patient Education: [x] Review HEP    [] Progressed/Changed HEP based on:   [] positioning   [] body mechanics   [] transfers   [] heat/ice application    [] other:      Other Objective/Functional Measures:  HGIR pre 40 deg/post 70 bilateral  Horizontal ABD both 80/90 pre/post  Updated HEP/standing lat stretch combined with breathing     Pain Level (0-10 scale) post treatment: 0    ASSESSMENT/Changes in Function: improvement in functional mobility    Patient will continue to benefit from skilled PT services to address ROM deficits, address strength deficits, analyze and address soft tissue restrictions, analyze and cue movement patterns and assess and modify postural abnormalities to attain remaining goals. [x]  See Plan of Care  []  See progress note/recertification  []  See Discharge Summary         Progress towards goals / Updated goals:  Short Term Goals: STG- To be accomplished in 2 week(s):  1.  Pt will be independent with HEP to encourage prophylaxis. Eval:HEP dispensed   Current: compliance, updated today 3/16/20,  goal MET     Long Term Goals: LTG- To be accomplished in 6 week(s):  1.  Pt will demonstrate ability to complete pull ups without right shoulder pain to demonstrate improved scapular strength. Eval:pain with 1 pullup, increased thoracic extension and rib flare, challenged with PPT  Current: challenged with maintaining PPT with exercises, unable to complete pull ups without pain however able to engage lats with lat pull down now progressing      2.  Pt trunk rotation will improve to at least 15in to demonstrate improvef functional thoracic mobility  Eval: 17in right, 15.5in left   Current: 15.5n right, 15in left almost MET     3.  Pt right shoulder flexion and IR AROM  will improve to [facility] allow pt to complete ADLs with increased ease. Eval: 134* flexion ,25* IR  Current 3/16/20: 70* IR, 150* flexion progressing      4.  Pt FOTO score will increase by >/ = 8 points to show improvement in subjective function.   Eval:72  Current: 78 progressing  To be retested every 5 visits         PLAN  []  Upgrade activities as tolerated     [x]  Continue plan of care  []  Update interventions per flow sheet       []  Discharge due to:_  []  Other:_      Angélica Jane, PT 3/16/2020  3:36 PM    Future Appointments   Date Time Provider [place] Tsai   3/18/2020  4:30 PM Claudia De Jesus, PT [facility]   3/24/2020 10:45 AM Garcia Bean THE [facility]   3/27/2020  8:45 AM Garcia Bean THE [facility]   3/31/2020  8:30 AM Garcia BeanHPNARCISO THE [facility]

## 2020-03-18 ENCOUNTER — HOSPITAL ENCOUNTER (OUTPATIENT)
Dept: PHYSICAL THERAPY | Age: 50
Discharge: HOME | End: 2020-03-18
Payer: OTHER GOVERNMENT

## 2020-03-18 PROCEDURE — 97110 THERAPEUTIC EXERCISES: CPT

## 2020-03-18 PROCEDURE — 97112 NEUROMUSCULAR REEDUCATION: CPT

## 2020-03-18 NOTE — PROGRESS NOTES
PT DAILY TREATMENT NOTE    Patient Name: Osiel Nayak  Date:3/18/2020  : 1970  [x]  Patient  Verified  Payor:  / Plan: [facility]  [facility] / Product Type:  /    In time:320  Out time:410  Total Treatment Time (min): 50  Total Timed Codes (min): 50  1:1 Treatment Time ([facility] only): 50    Visit #: 9 of 12    Treatment Area: Right shoulder pain [M25.511]  Pain in right arm [M79.601]    SUBJECTIVE  Pain Level (0-10 scale): 0  Any medication changes, allergies to medications, adverse drug reactions, diagnosis change, or new procedure performed?: [x] No    [] Yes (see summary sheet for update)  Subjective functional status/changes:   [x] No changes reported      OBJECTIVE  10 min Therapeutic Exercise:  [x] See flow sheet :   Rationale: increase ROM and increase strength to improve the patients ability to return to PLOF       40 min Neuromuscular Re-education:  [x]  See flow sheet :LORA progression   Rationale: increase ROM, increase strength, improve coordination and increase proprioception  to improve the patients ability to return to PLOF        With   [x] TE   [] TA   [] neuro   [] other: Patient Education: [x] Review HEP    [] Progressed/Changed HEP based on:   [] positioning   [] body mechanics   [] transfers   [] heat/ice application    [] other:      Other Objective/Functional Measures: HGIR pre 60/post 80   SLR both 70 (post session)  Trunk rot right 15\", left 14\" (post session)  Tight lats    Pain Level (0-10 scale) post treatment: 0    ASSESSMENT/Changes in Function: ability to perform all activities without pain, challenged with reverse squat, lat stretch, end range shoulder Flex, requires occasional VC to maintain PPT during dynamic activities    Patient will continue to benefit from skilled PT services to address ROM deficits, analyze and address soft tissue restrictions and assess and modify postural abnormalities to attain remaining goals.      [x]  See Plan of Care  []  See progress note/recertification  []  See Discharge Summary         Progress towards goals / Updated goals:  Short Term Goals: STG- To be accomplished in 2 week(s):  1.  Pt will be independent with HEP to encourage prophylaxis. Eval:HEP dispensed   Current: compliance, updated today 3/16/20,  goal MET     Long Term Goals: LTG- To be accomplished in 6 week(s):  1.  Pt will demonstrate ability to complete pull ups without right shoulder pain to demonstrate improved scapular strength. Eval:pain with 1 pullup, increased thoracic extension and rib flare, challenged with PPT  Current: challenged with maintaining PPT with exercises, unable to complete pull ups without pain however able to engage lats with lat pull down now progressing      2.  Pt trunk rotation will improve to at least 15in to demonstrate improvef functional thoracic mobility  Eval: 17in right, 15.5in left   Current 3/18/20: 15 in right, 14 in left , MET     3.  Pt right shoulder flexion and IR AROM  will improve to [facility] allow pt to complete ADLs with increased ease. Eval: 134* flexion ,25* IR  Current 3/18/20: 80 IR, 150* flexion progressing      4.  Pt FOTO score will increase by >/ = 8 points to show improvement in subjective function.   Eval:72  Current: 78 progressing  To be retested every 5 visits         PLAN  []  Upgrade activities as tolerated     [x]  Continue plan of care  []  Update interventions per flow sheet       []  Discharge due to:_  []  Other:_      Jesus Gilbert, PT 3/18/2020  5:12 PM    Future Appointments   Date Time Provider [place] Tsai   3/24/2020 10:45 AM Rashmi Bean [facility]   3/27/2020  8:45 AM Rashmi Bean [facility]   3/31/2020  8:30 AM Rashmi Bean [facility]

## 2020-03-24 ENCOUNTER — APPOINTMENT (OUTPATIENT)
Dept: PHYSICAL THERAPY | Age: 50
End: 2020-03-24
Payer: OTHER GOVERNMENT

## 2020-03-27 ENCOUNTER — APPOINTMENT (OUTPATIENT)
Dept: PHYSICAL THERAPY | Age: 50
End: 2020-03-27
Payer: OTHER GOVERNMENT

## 2020-03-31 ENCOUNTER — APPOINTMENT (OUTPATIENT)
Dept: PHYSICAL THERAPY | Age: 50
End: 2020-03-31
Payer: OTHER GOVERNMENT

## 2020-03-31 ENCOUNTER — DOCUMENTATION ONLY (OUTPATIENT)
Dept: PHYSICAL THERAPY | Age: 50
End: 2020-03-31

## 2020-04-02 ENCOUNTER — APPOINTMENT (OUTPATIENT)
Dept: PHYSICAL THERAPY | Age: 50
End: 2020-04-02
Payer: OTHER GOVERNMENT

## 2020-04-07 ENCOUNTER — APPOINTMENT (OUTPATIENT)
Dept: PHYSICAL THERAPY | Age: 50
End: 2020-04-07
Payer: OTHER GOVERNMENT

## 2020-04-10 ENCOUNTER — APPOINTMENT (OUTPATIENT)
Dept: PHYSICAL THERAPY | Age: 50
End: 2020-04-10
Payer: OTHER GOVERNMENT

## 2020-04-14 ENCOUNTER — HOSPITAL ENCOUNTER (OUTPATIENT)
Dept: PHYSICAL THERAPY | Age: 50
Discharge: HOME | End: 2020-04-14
Payer: OTHER GOVERNMENT

## 2020-04-14 PROCEDURE — 97110 THERAPEUTIC EXERCISES: CPT

## 2020-04-14 PROCEDURE — 97016 VASOPNEUMATIC DEVICE THERAPY: CPT

## 2020-04-14 PROCEDURE — 97140 MANUAL THERAPY 1/> REGIONS: CPT

## 2020-04-14 NOTE — PROGRESS NOTES
[facility] at [facility] [address], [place], [address] [address]  Phone: [phone]      Fax:  [phone]    Progress Note  Patient name: Bon Flores Start of Care: 2020   Referral source: Ana Hicks MD : 1970               Medical Diagnosis: Right shoulder pain [M25.511]  Pain in right arm [M79.601]    Onset Date:3 weeks               Treatment Diagnosis: right shoulder pain   Prior Hospitalization: see medical history Provider#: [id number]   Medications: Verified on Patient summary List    Comorbidities: bilateral knee scope   Prior Level of Function: working out 5-6days a week     Visits from Start of Care: 10    Missed Visits: 0    Progress Towards Goals:   Short Term Goals: STG- To be accomplished in 2 week(s):  1.  Pt will be independent with HEP to encourage prophylaxis. Eval:HEP dispensed   Last PN: compliance, updated today 3/16/20,  goal MET     Long Term Goals: LTG- To be accomplished in 6 week(s):  1.  Pt will demonstrate ability to complete pull ups without right shoulder pain to demonstrate improved scapular strength. Eval:pain with 1 pullup, increased thoracic extension and rib flare, challenged with PPT  Last PN:  challenged with maintaining PPT with exercises, unable to complete pull ups without pain however able to engage lats with lat pull down now  Current: increased pain over last month in posterior shoulder with inabiliity to complete pull up or push up without pain progressing      2.  Pt trunk rotation will improve to at least 15in to demonstrate improvef functional thoracic mobility  Eval: 17in right, 15.5in left   Last PN: 15.5in right, 15in left   Current: 15in left, 16in right progressing well      3.  Pt right shoulder flexion and IR AROM  will improve to [facility] allow pt to complete ADLs with increased ease. Eval: 134* flexion ,25* IR  Last PN: 60* IR, 150* flexion   Current 35* IR, 135* flexion regression      4.  Pt FOTO score will increase by >/ = 8 points to show improvement in subjective function. Eval:72  Last PN: 78  Current:  65 regression     Key Functional Changes: Pt presented to therapy with c/c right anterior/lateral shoulder pain ongoing for months. Pt has only attended 10 sessions including initial eval due to COVID 19 public health crisis with regression in goals due to recent increase in right shoulder pain with insidious onset. Pt today demonstrating decreased right shoulder ROM with reaching 135* flexion with pain. Continues to demonstrate decreased scapular strength as well with challenge with QP. TTP over right UT and supraspinatus today with AC separation noted. Pt would benefit from continued skilled PT services to address remaining unmet goals to allow pt to complete ADLs with less pain. Updated Goals: to be achieved in 4 weeks:   See goals above    ASSESSMENT/RECOMMENDATIONS:  [x]Continue therapy per initial plan/protocol at a frequency of  2 x per week for 4 weeks  []Continue therapy with the following recommended changes:_____________________      _____________________________________________________________________  []Discontinue therapy progressing towards or have reached established goals  []Discontinue therapy due to lack of appreciable progress towards goals  []Discontinue therapy due to lack of attendance or compliance  []Await Physician's recommendations/decisions regarding therapy  []Other:________________________________________________________________    Thank you for this referral.   Mindy Bean 4/14/2020 3:27 PM  NOTE TO PHYSICIAN:  [address] AND   [facility] TO [facility]: [phone]  If you are unable to process this request in 24 hours please contact our office: [phone]        []  I have read the above report and request that my patient continue as recommended.   []  I have read the above report and request that my patient continue therapy with the following changes/special instructions:________________________________________  []I have read the above report and request that my patient be discharged from therapy.     [de-identified] Signature:____________Date:_________TIME:________    [facility], Date and Time must be completed for valid certification **

## 2020-04-14 NOTE — PROGRESS NOTES
PT DAILY TREATMENT NOTE    Patient Name: Ivet Mar  Date:2020  : 1970  [x]  Patient  Verified  Payor:  / Plan: Lex Ordoñez 74 / Product Type:  /    In time:1:30  Out time:2:55  Total Treatment Time (min): 85  Total Timed Codes (min): 85  1:1 Treatment Time ( [address] only): 85   Visit #: 10 of 12    Treatment Area: Right shoulder pain [M25.511]  Pain in right arm [M79.601]    SUBJECTIVE  Pain Level (0-10 scale): 3-4  Any medication changes, allergies to medications, adverse drug reactions, diagnosis change, or new procedure performed?: [x] No    [] Yes (see summary sheet for update)  Subjective functional status/changes:   [] No changes reported  \"My shoulder has really been hurting me in the back. \"    OBJECTIVE    Modalities Rationale:     decrease edema, decrease inflammation and decrease pain to improve patient's ability to perform daily activities with decreased pain and symptom levels   min [] Estim, type/location:                                      []  att     []  unatt     []  w/US     []  w/ice    []  w/heat    min []  Mechanical Traction: type/lbs                   []  pro   []  sup   []  int   []  cont    []  before manual    []  after manual    min []  Ultrasound, settings/location:      min []  Iontophoresis w/ dexamethasone, location:                                               []  take home patch       []  in clinic    min []  Ice     []  Heat    location/position:    10 min [x]  Vasopneumatic Device, press/temp: Sitting, right shoulder     min []  Other:    [x] Skin assessment post-treatment (if applicable):    [x]  intact    []  redness- no adverse reaction     []redness - adverse reaction:            55 min Therapeutic Exercise:  [x] See flow sheet :   Rationale: increase ROM and increase strength to improve the patients ability to perform daily activities with decreased pain and symptom levels    20 min Manual Therapy:  Rib mobs with breaths, right apical expansion, STM to pecs, lats, grade III GHJ mobs, GHJ distraction in supine and s/l    Rationale: decrease pain, increase ROM and increase tissue extensibility to improve the patients ability to perform daily activities with decreased pain and symptom levels    With   [] TE   [] TA   [] neuro   [] other: Patient Education: [x] Review HEP    [] Progressed/Changed HEP based on:   [] positioning   [] body mechanics   [] transfers   [] heat/ice application    [] other:      Other Objective/Functional Measures:   LORA Special Tests (pre/post)   HGIR:                                                 Right: 30*/45*             Left:60*/65*  Shoulder Flexion   Right: 100*/135*             Left: 145*/155*  Trunk Rot                                           Right: 16in/16in    Left 16in /15in  Shoulder Horizontal Abduction          Right: - /-             Left: - /-    See updated goals below     Pain Level (0-10 scale) post treatment: 3    ASSESSMENT/Changes in Function: Pt presented to therapy with c/c right anterior/lateral shoulder pain ongoing for months. Pt has only attended 10 sessions including initial eval due to COVID 19 public health crisis with regression in goals due to recent increase in right shoulder pain with insidious onset. Pt today demonstrating decreased right shoulder ROM with reaching 135* flexion with pain. Continues to demonstrate decreased scapular strength as well with challenge with QP. TTP over right UT and supraspinatus today with AC separation noted. Pt would benefit from continued skilled PT services to address remaining unmet goals to allow pt to complete ADLs with less pain.      Patient will continue to benefit from skilled PT services to modify and progress therapeutic interventions, address functional mobility deficits, address ROM deficits, address strength deficits, analyze and address soft tissue restrictions, analyze and cue movement patterns, analyze and modify body mechanics/ergonomics, assess and modify postural abnormalities, address imbalance/dizziness and instruct in home and community integration to attain remaining goals. []  See Plan of Care  []  See progress note/recertification  []  See Discharge Summary         Progress towards goals / Updated goals:  Short Term Goals: STG- To be accomplished in 2 week(s):  1.  Pt will be independent with HEP to encourage prophylaxis. Eval:HEP dispensed   Last PN: compliance, updated today 3/16/20,  goal MET     Long Term Goals: LTG- To be accomplished in 6 week(s):  1.  Pt will demonstrate ability to complete pull ups without right shoulder pain to demonstrate improved scapular strength. Eval:pain with 1 pullup, increased thoracic extension and rib flare, challenged with PPT  Last PN:  challenged with maintaining PPT with exercises, unable to complete pull ups without pain however able to engage lats with lat pull down now  Current: increased pain over last month in posterior shoulder with inabiliity to complete pull up or push up without pain progressing      2.  Pt trunk rotation will improve to at least 15in to demonstrate improvef functional thoracic mobility  Eval: 17in right, 15.5in left   Last PN: 15.5in right, 15in left   Current: 15in left, 16in right progressing well      3.  Pt right shoulder flexion and IR AROM  will improve to [facility] allow pt to complete ADLs with increased ease. Eval: 134* flexion ,25* IR  Last PN: 60* IR, 150* flexion   Current 35* IR, 135* flexion regression      4.  Pt FOTO score will increase by >/ = 8 points to show improvement in subjective function. Eval:72  Last PN: 78  Current:  65 regression     PLAN  [x]  Upgrade activities as tolerated     [x]  Continue plan of care  []  Update interventions per flow sheet       []  Discharge due to:_  []  Other:_      Laura Gomez 4/14/2020  1:35 PM    No future appointments.

## 2020-04-17 ENCOUNTER — APPOINTMENT (OUTPATIENT)
Dept: PHYSICAL THERAPY | Age: 50
End: 2020-04-17
Payer: OTHER GOVERNMENT

## 2020-04-23 ENCOUNTER — HOSPITAL ENCOUNTER (OUTPATIENT)
Dept: PHYSICAL THERAPY | Age: 50
Discharge: HOME | End: 2020-04-23
Payer: OTHER GOVERNMENT

## 2020-04-23 PROCEDURE — 97140 MANUAL THERAPY 1/> REGIONS: CPT

## 2020-04-23 PROCEDURE — 97110 THERAPEUTIC EXERCISES: CPT

## 2020-04-23 NOTE — PROGRESS NOTES
PT DAILY TREATMENT NOTE    Patient Name: Say Man  Date:2020  : 1970  [x]  Patient  Verified  Payor:  / Plan: Lex Ordoñez 74 / Product Type:  /    In time:2:52  Out time:4:00  Total Treatment Time (min): 68  Total Timed Codes (min): 68  1:1 Treatment Time ( [address] only): 68   Visit #: 11 of 18    Treatment Area: Right shoulder pain [M25.511]  Pain in right arm [M79.601]    SUBJECTIVE  Pain Level (0-10 scale): 1  Any medication changes, allergies to medications, adverse drug reactions, diagnosis change, or new procedure performed?: [x] No    [] Yes (see summary sheet for update)  Subjective functional status/changes:   [] No changes reported  \"Better than last week but still hurts in the back and in the elbow. \"    OBJECTIVE      53 min Therapeutic Exercise:  [x] See flow sheet :   Rationale: increase ROM and increase strength to improve the patients ability to perform daily activities with decreased pain and symptom levels      15 min Manual Therapy:  Grade III GHJ mobs, rib mobs with breaths,    Rationale: decrease pain, increase ROM and increase tissue extensibility to improve the patients ability to perform daily activities with decreased pain and symptom levels          With   [] TE   [] TA   [] neuro   [] other: Patient Education: [x] Review HEP    [] Progressed/Changed HEP based on:   [] positioning   [] body mechanics   [] transfers   [] heat/ice application    [] other:      Other Objective/Functional Measures:   LORA Special Tests (pre/post)  HGIR:                                                 Right: 35*/42*             Left: 45*/50*  Shoulder Flexion   Right: 130*/150*             Left: 145*/153*  Shoulder Horizontal Abduction          Right: + /-             Left: - /-          Pain Level (0-10 scale) post treatment: 0    ASSESSMENT/Changes in Function: Right shoulder ROM is improving however still pain at end ranges.  Cues still needed to decrease extension and UT compensation with exercises. Challenged with motor control of side plank lift and lat pulls. Patient will continue to benefit from skilled PT services to modify and progress therapeutic interventions, address functional mobility deficits, address ROM deficits, address strength deficits, analyze and address soft tissue restrictions, analyze and cue movement patterns, analyze and modify body mechanics/ergonomics, assess and modify postural abnormalities and instruct in home and community integration to attain remaining goals. []  See Plan of Care  []  See progress note/recertification  []  See Discharge Summary         Progress towards goals / Updated goals:  Long Term Goals: LTG- To be accomplished in 6 week(s):  1.  Pt will demonstrate ability to complete pull ups without right shoulder pain to demonstrate improved scapular strength. Eval:pain with 1 pullup, increased thoracic extension and rib flare, challenged with PPT  Last PN:: increased pain over last month in posterior shoulder with inabiliity to complete pull up or push up without pain  Current; unable to complete due to decreased right shoulder AROM 4/23/2020     2.  Pt trunk rotation will improve to at least 15in to demonstrate improvef functional thoracic mobility  Eval: 17in right, 15.5in left   Last PN: 15in left, 16in right  Current:      3.  Pt right shoulder flexion and IR AROM  will improve to CHALO [facility] allow pt to complete ADLs with increased ease. Eval: 134* flexion ,25* IR  Last PN: 35* IR, 135* flexion  Current: 42* IR, 150* flexion at end of session 4/23/20     4.  Pt FOTO score will increase by >/ = 8 points to show improvement in subjective function.   Eval:72  Last PN: 65  Current: will reassess visit 15    PLAN  []  Upgrade activities as tolerated     [x]  Continue plan of care  []  Update interventions per flow sheet       []  Discharge due to:_  []  Other:_      Flaco STERLING Remesi 4/23/2020  3:00 PM    No future appointments.

## 2020-04-29 ENCOUNTER — HOSPITAL ENCOUNTER (OUTPATIENT)
Dept: PHYSICAL THERAPY | Age: 50
Discharge: HOME | End: 2020-04-29
Payer: OTHER GOVERNMENT

## 2020-04-29 PROCEDURE — 97140 MANUAL THERAPY 1/> REGIONS: CPT

## 2020-04-29 PROCEDURE — 97112 NEUROMUSCULAR REEDUCATION: CPT

## 2020-04-29 PROCEDURE — 97530 THERAPEUTIC ACTIVITIES: CPT

## 2020-04-29 PROCEDURE — 97110 THERAPEUTIC EXERCISES: CPT

## 2020-04-29 NOTE — PROGRESS NOTES
PT DAILY TREATMENT NOTE    Patient Name: Ivet Mar  Date:2020  : 1970  [x]  Patient  Verified  Payor: ANTONY / Plan: Lex Ordoñez 74 / Product Type:  /    In time:1:17 pm  Out time:2:27 pm  Total Treatment Time (min): 70  Total Timed Codes (min): 70  Visit #: 12 of 18    Treatment Area: Right shoulder pain [M25.511]  Pain in right arm [M79.601]    SUBJECTIVE  Pain Level (0-10 scale): 2  Any medication changes, allergies to medications, adverse drug reactions, diagnosis change, or new procedure performed?: [x] No    [] Yes (see summary sheet for update)  Subjective functional status/changes:   [] No changes reported  \"Crap. I want you to break me and put me back together. \"  Reports pain with sleeping, washing dishes at rest.    OBJECTIVE    Modalities Rationale:       min [] Estim, type/location:                                      []  att     []  unatt     []  w/US     []  w/ice    []  w/heat    min []  Mechanical Traction: type/lbs                   []  pro   []  sup   []  int   []  cont    []  before manual    []  after manual    min []  Ultrasound, settings/location:      min []  Iontophoresis w/ dexamethasone, location:                                               []  take home patch       []  in clinic    min []  Ice     []  Heat    location/position:     min []  Vasopneumatic Device, press/temp:     min []  Other:    [] Skin assessment post-treatment (if applicable):    []  intact    []  redness- no adverse reaction     []redness - adverse reaction:          10 min Therapeutic Exercise:  [x] See flow sheet :   Rationale: increase ROM, increase strength, improve coordination and increase proprioception to improve the patients ability to perform daily activities with decreased pain and symptom levels    15 min Therapeutic Activity:  [x]  See flow sheet :   Rationale: increase ROM, increase strength, improve coordination and increase proprioception  to improve the patients ability to perform daily activities with decreased pain and symptom levels     15 min Neuromuscular Re-education:  [x]  See flow sheet :   Rationale: increase ROM, increase strength, improve coordination and increase proprioception  to improve the patients ability to perform daily activities with decreased pain and symptom levels      30 min Manual Therapy:  LORA AIC correction, Sternal mobilization with active pelvic tilt, Superior T4 (LORA technique), Right subclavius release (LORA technique), LORA infraclavicular rib pump with active breath   Right sibson's release  Vacuum cupping in left S/L along lat line with active breaths  Vacuum cupping in child's pose  STM and vacuum cupping to right infraspinatus  scap mobs and functional massage to right subscap and lat in S/L  Obtained consent for hand placement      Rationale: decrease pain, increase ROM, increase tissue extensibility, decrease trigger points and increase postural awareness to improve the patients ability to perform daily activities with decreased pain and symptom levels            With   [] TE   [] TA   [] neuro   [] other: Patient Education: [x] Review HEP    [] Progressed/Changed HEP based on:   [] positioning   [] body mechanics   [] transfers   [] heat/ice application    [] other:      Other Objective/Functional Measures:   LORA Special Tests (pre/post)  HGIR:                                                 Right: 35/66             Left: 49/85  Shoulder Flexion   Right: 111*/151*          Left: 130*/155*  Hip Add Drop Test:                           Right: +/-            Left:+/midline  Trunk Rot                                           Right: 17 in/16 in Left 17.5 in  /15.5 in   Shoulder Horizontal Abduction          Right: 24 /43             Left: 11 /36     Apical Ext Test:                                   Right: decreased  Left: decreased  Extension Drop                                   Right : -          Left: +    Pain Level (0-10 scale) post treatment: 0    ASSESSMENT/Changes in Function:   Pt responded very well to treatment this session. Had strong red reaction along right lat line and infraspinatus. Pt expressed frustration at pain with sleeping. Elbow pain with washing dishes. Very challenged with left oblique facilitation. Patient will continue to benefit from skilled PT services to modify and progress therapeutic interventions, address functional mobility deficits, address ROM deficits, address strength deficits, analyze and address soft tissue restrictions, analyze and cue movement patterns, analyze and modify body mechanics/ergonomics, assess and modify postural abnormalities and instruct in home and community integration to attain remaining goals. []  See Plan of Care  []  See progress note/recertification  []  See Discharge Summary         Progress towards goals / Updated goals:  Long Term Goals: LTG- To be accomplished in 6 week(s):  1.  Pt will demonstrate ability to complete pull ups without right shoulder pain to demonstrate improved scapular strength. Eval:pain with 1 pullup, increased thoracic extension and rib flare, challenged with PPT  Last PN:: increased pain over last month in posterior shoulder with inabiliity to complete pull up or push up without pain  Current; unable to complete due to decreased right shoulder AROM 4/23/2020     2.  Pt trunk rotation will improve to at least 15in to demonstrate improvef functional thoracic mobility  Eval: 17in right, 15.5in left   Last PN: 15in left, 16in right  Current: Progressing 4/29/20 Trunk Rot                                           Right: 17 in/16 in Left 17.5 in  /15.5 in         3.  Pt right shoulder flexion and IR AROM  will improve to [facility]O [facility] allow pt to complete ADLs with increased ease.   Eval: 134* flexion ,25* IR  Last PN: 35* IR, 135* flexion  Current: 64* IR, 150* flexion at end of session 4/29/20     4.  Pt FOTO score will increase by >/ = 8 points to show improvement in subjective function.   Eval:72  Last PN: 65  Current: will reassess visit 15    PLAN  [x]  Upgrade activities as tolerated     []  Continue plan of care  []  Update interventions per flow sheet       []  Discharge due to:_  []  Other:_      Flower Salinas, PT, DPT 4/29/2020  1:08 PM    Future Appointments   Date Time Provider [place] Tsai   4/29/2020  1:30 PM Noemy Power, PT, DPT [facility]

## 2020-05-05 ENCOUNTER — HOSPITAL ENCOUNTER (OUTPATIENT)
Dept: PHYSICAL THERAPY | Age: 50
Discharge: HOME | End: 2020-05-05
Payer: OTHER GOVERNMENT

## 2020-05-05 PROCEDURE — 97112 NEUROMUSCULAR REEDUCATION: CPT

## 2020-05-05 PROCEDURE — 97140 MANUAL THERAPY 1/> REGIONS: CPT

## 2020-05-05 PROCEDURE — 97530 THERAPEUTIC ACTIVITIES: CPT

## 2020-05-05 PROCEDURE — 97110 THERAPEUTIC EXERCISES: CPT

## 2020-05-05 NOTE — PROGRESS NOTES
PT DAILY TREATMENT NOTE    Patient Name: Kody Cotto  Date:2020  : 1970  [x]  Patient  Verified  Payor:  / Plan: Lex Ordoñez 74 / Product Type:  /    In time:3:20  Out time:4:35 pm  Total Treatment Time (min): 75  Total Timed Codes (min): 75  Visit #: 13 of 18    Treatment Area: Right shoulder pain [M25.511]  Pain in right arm [M79.601]    SUBJECTIVE  Pain Level (0-10 scale): 2  Any medication changes, allergies to medications, adverse drug reactions, diagnosis change, or new procedure performed?: [x] No    [] Yes (see summary sheet for update)  Subjective functional status/changes:   [] No changes reported  \"A little bit better than crappy. \"  reported decreased pain after last session but pain started to return in last couple days     OBJECTIVE    Modalities Rationale:       min [] Estim, type/location:                                      []  att     []  unatt     []  w/US     []  w/ice    []  w/heat    min []  Mechanical Traction: type/lbs                   []  pro   []  sup   []  int   []  cont    []  before manual    []  after manual    min []  Ultrasound, settings/location:      min []  Iontophoresis w/ dexamethasone, location:                                               []  take home patch       []  in clinic    min []  Ice     []  Heat    location/position:     min []  Vasopneumatic Device, press/temp:     min []  Other:    [] Skin assessment post-treatment (if applicable):    []  intact    []  redness- no adverse reaction     []redness - adverse reaction:        10 min Therapeutic Exercise:  [x]? See flow sheet :   Rationale: increase ROM, increase strength, improve coordination and increase proprioception to improve the patients ability to perform daily activities with decreased pain and symptom levels     20 min Therapeutic Activity:  [x]?   See flow sheet :   Rationale: increase ROM, increase strength, improve coordination and increase proprioception  to improve the patients ability to perform daily activities with decreased pain and symptom levels     25 min Neuromuscular Re-education:  [x]? See flow sheet :   Rationale: increase ROM, increase strength, improve coordination and increase proprioception  to improve the patients ability to perform daily activities with decreased pain and symptom levels        20 min Manual Therapy:  LORA AIC correction, Sternal mobilization with active pelvic tilt, Superior T4 (LORA technique), Right subclavius release (LORA technique), LORA infraclavicular rib pump with active breath   Right sibson's release  Vacuum cupping in left S/L along lat line with active breaths  Vacuum cupping in child's pose  STM and vacuum cupping to right infraspinatus  Instrument augmented STM to right infraspinatus   Obtained consent for hand placement       Rationale: decrease pain, increase ROM, increase tissue extensibility, decrease trigger points and increase postural awareness to improve the patients ability to perform daily activities with decreased pain and symptom levels          With   [] TE   [] TA   [] neuro   [] other: Patient Education: [x] Review HEP    [] Progressed/Changed HEP based on:   [] positioning   [] body mechanics   [] transfers   [] heat/ice application    [] other:      Other Objective/Functional Measures:   LORA Special Tests (pre/post)  HGIR:                                                 Right: 50/70             Left: 59/80  Trunk Rot                                           Right: 16.5 in/16 in Left 17 in /15 in  Shoulder Horizontal Abduction          Right: 27 /42             Left: 21 /33        Pain Level (0-10 scale) post treatment: 0    ASSESSMENT/Changes in Function:   Strong red reaction to cupping. Active trigger points in infraspinatus. Able to inhibit pecs with ab press down and mod all four belly. Very challenged with left oblique facilitation.      Patient will continue to benefit from skilled PT services to modify and progress therapeutic interventions, address functional mobility deficits, address ROM deficits, address strength deficits, analyze and address soft tissue restrictions, analyze and cue movement patterns, analyze and modify body mechanics/ergonomics, assess and modify postural abnormalities and instruct in home and community integration to attain remaining goals. []  See Plan of Care  []  See progress note/recertification  []  See Discharge Summary         Progress towards goals / Updated goals:  Long Term Goals: LTG- To be accomplished in 6 week(s):  1.  Pt will demonstrate ability to complete pull ups without right shoulder pain to demonstrate improved scapular strength. Eval:pain with 1 pullup, increased thoracic extension and rib flare, challenged with PPT  Last PN:: increased pain over last month in posterior shoulder with inabiliity to complete pull up or push up without pain  Current; unable to complete due to decreased right shoulder AROM 4/23/2020     2.  Pt trunk rotation will improve to at least 15in to demonstrate improvef functional thoracic mobility  Eval: 17in right, 15.5in left   Last PN: 15in left, 16in right  Current: Progressing 4/29/20 Trunk Rot                                           Right: 17 in/16 in      Left 17.5 in  /15.5 in          3.  Pt right shoulder flexion and IR AROM  will improve to [facility] allow pt to complete ADLs with increased ease. Eval: 134* flexion ,25* IR  Last PN: 35* IR, 135* flexion  Current: 71* IR, 150* flexion at end of session 5/5/20     4.  Pt FOTO score will increase by >/ = 8 points to show improvement in subjective function.   Eval:72  Last PN: 65  Current: will reassess visit 15    PLAN  [x]  Upgrade activities as tolerated     []  Continue plan of care  []  Update interventions per flow sheet       []  Discharge due to:_  []  Other:_      Nupur Denis, PT, DPT 5/5/2020  3:28 PM    Future Appointments   Date Time Provider [place] Tsai 5/5/2020  3:30 PM Abiel Wayne PT, DPT [facility]   5/7/2020  1:30 PM Abiel Wayne PT, DPT [facility]

## 2020-05-07 ENCOUNTER — HOSPITAL ENCOUNTER (OUTPATIENT)
Dept: PHYSICAL THERAPY | Age: 50
Discharge: HOME | End: 2020-05-07
Payer: OTHER GOVERNMENT

## 2020-05-07 PROCEDURE — 97140 MANUAL THERAPY 1/> REGIONS: CPT

## 2020-05-07 PROCEDURE — 97112 NEUROMUSCULAR REEDUCATION: CPT

## 2020-05-07 PROCEDURE — 97110 THERAPEUTIC EXERCISES: CPT

## 2020-05-07 NOTE — PROGRESS NOTES
PT DAILY TREATMENT NOTE    Patient Name: Kody Cotto  Date:2020  : 1970  [x]  Patient  Verified  Payor: ANTONY / Plan: Lex Ordoñez 74 / Product Type:  /    In time:1:30 pm  Out time:2:32 pm  Total Treatment Time (min): 62  Total Timed Codes (min): 62  Visit #: 14 of 18    Treatment Area: Right shoulder pain [M25.511]  Pain in right arm [M79.601]    SUBJECTIVE  Pain Level (0-10 scale): 1  Any medication changes, allergies to medications, adverse drug reactions, diagnosis change, or new procedure performed?: [x] No    [] Yes (see summary sheet for update)  Subjective functional status/changes:   [] No changes reported  \"It is better than it was. \"  Continued pain with washing dishes and small activities. Reports pain at elbow.     OBJECTIVE    Modalities Rationale:    min [] Estim, type/location:                                      []  att     []  unatt     []  w/US     []  w/ice    []  w/heat    min []  Mechanical Traction: type/lbs                   []  pro   []  sup   []  int   []  cont    []  before manual    []  after manual    min []  Ultrasound, settings/location:      min []  Iontophoresis w/ dexamethasone, location:                                               []  take home patch       []  in clinic    min []  Ice     []  Heat    location/position:     min []  Vasopneumatic Device, press/temp:     min []  Other:    [] Skin assessment post-treatment (if applicable):    []  intact    []  redness- no adverse reaction     []redness - adverse reaction:          32 min Therapeutic Exercise:  [x] See flow sheet :   Rationale: increase ROM, increase strength, improve coordination and increase proprioception to improve the patients ability to perform daily activities with decreased pain and symptom levels       15 min Neuromuscular Re-education:  [x]  See flow sheet :   Rationale: increase ROM, increase strength, improve coordination and increase proprioception  to improve the patients ability to perform daily activities with decreased pain and symptom levels      15 min Manual Therapy:  LORA AIC correction, Sternal mobilization with active pelvic tilt, Superior T4 (LORA technique), Right subclavius release (LORA technique), LORA infraclavicular rib pump with active breath   Right sibson's release  STM to right lat and subscap  Manual left posterior hip capsule inhibition in left S/L with Right iliac depression   Obtained consent for hand placement      Rationale: decrease pain, increase ROM, increase tissue extensibility, decrease trigger points and increase postural awareness to improve the patients ability to perform daily activities with decreased pain and symptom levels          With   [] TE   [] TA   [] neuro   [] other: Patient Education: [x] Review HEP    [] Progressed/Changed HEP based on:   [] positioning   [] body mechanics   [] transfers   [] heat/ice application    [] other:      Other Objective/Functional Measures:   LORA Special Tests (pre/post)  HGIR:                                                 Right: 62/81             Left: 70/85  Shoulder Flexion   Right: 121/143             Left: 142/151  Hip Add Drop Test:                           Right: +/-            Left:+/-  Trunk Rot                                           Right: 15.5 in/15 in  Left 16.5 in  /15 in  Shoulder Horizontal Abduction          Right: 36 /45             Left: 21 /34    Functional IR    Right: T10-9  Left T7       Pain Level (0-10 scale) post treatment: 0    ASSESSMENT/Changes in Function:   Pt has strong residual marks from cupping. Responded very well to repositioning activities. Requires max cues to facilitate hamstrings in standing and 90-90. Noted improved ability to inhibit pecs with push down.      Patient will continue to benefit from skilled PT services to modify and progress therapeutic interventions, address functional mobility deficits, address ROM deficits, address strength deficits, analyze and address soft tissue restrictions, analyze and cue movement patterns, analyze and modify body mechanics/ergonomics, assess and modify postural abnormalities and instruct in home and community integration to attain remaining goals. []  See Plan of Care  []  See progress note/recertification  []  See Discharge Summary         Progress towards goals / Updated goals:  Long Term Goals: LTG- To be accomplished in 6 week(s):  1.  Pt will demonstrate ability to complete pull ups without right shoulder pain to demonstrate improved scapular strength. Eval:pain with 1 pullup, increased thoracic extension and rib flare, challenged with PPT  Last PN:: increased pain over last month in posterior shoulder with inabiliity to complete pull up or push up without pain  Current; unable to complete due to decreased right shoulder AROM 4/23/2020     2.  Pt trunk rotation will improve to at least 15in to demonstrate improvef functional thoracic mobility  Eval: 17in right, 15.5in left   Last PN: 15in left, 16in right  Current: Progressing 5/7/20 15 in bilaterally        3.  Pt right shoulder flexion and IR AROM  will improve to [facility] allow pt to complete ADLs with increased ease. Eval: 134* flexion ,25* IR  Last PN: 35* IR, 135* flexion  Current: 71* IR, 150* flexion at end of session 5/5/20     4.  Pt FOTO score will increase by >/ = 8 points to show improvement in subjective function. Eval:72  Last PN: 65  Current: will reassess visit 15    PLAN  [x]  Upgrade activities as tolerated     []  Continue plan of care  []  Update interventions per flow sheet       []  Discharge due to:_  []  Other:_      Thelma Anaya, PT, DPT 5/7/2020  1:35 PM    No future appointments.

## 2020-05-11 ENCOUNTER — HOSPITAL ENCOUNTER (OUTPATIENT)
Dept: PHYSICAL THERAPY | Age: 50
Discharge: HOME | End: 2020-05-11
Payer: OTHER GOVERNMENT

## 2020-05-11 PROCEDURE — 97112 NEUROMUSCULAR REEDUCATION: CPT

## 2020-05-11 PROCEDURE — 97110 THERAPEUTIC EXERCISES: CPT

## 2020-05-11 PROCEDURE — 97140 MANUAL THERAPY 1/> REGIONS: CPT

## 2020-05-11 NOTE — PROGRESS NOTES
PT DAILY TREATMENT NOTE    Patient Name: Lilibeth Fuelling  Date:2020  : 1970  [x]  Patient  Verified  Payor:  / Plan: Lakesha Carson / Product Type:  /    In time:2:25 pm  Out time:3:35 pm  Total Treatment Time (min): 70  Total Timed Codes (min): 70  Visit #: 15 of 18    Treatment Area: Right shoulder pain [M25.511]  Pain in right arm [M79.601]    SUBJECTIVE  Pain Level (0-10 scale): 2  Any medication changes, allergies to medications, adverse drug reactions, diagnosis change, or new procedure performed?: [x] No    [] Yes (see summary sheet for update)  Subjective functional status/changes:   [] No changes reported  \"Better but still there.  I can feel the elbow when I wash my hands or press in.\"    OBJECTIVE    Modalities Rationale:      min [] Estim, type/location:                                      []  att     []  unatt     []  w/US     []  w/ice    []  w/heat    min []  Mechanical Traction: type/lbs                   []  pro   []  sup   []  int   []  cont    []  before manual    []  after manual    min []  Ultrasound, settings/location:      min []  Iontophoresis w/ dexamethasone, location:                                               []  take home patch       []  in clinic    min []  Ice     []  Heat    location/position:     min []  Vasopneumatic Device, press/temp:     min []  Other:    [] Skin assessment post-treatment (if applicable):    []  intact    []  redness- no adverse reaction     []redness - adverse reaction:          25 min Therapeutic Exercise:  [x] See flow sheet :   Rationale: increase ROM, increase strength, improve coordination and increase proprioception to improve the patients ability to perform daily activities with decreased pain and symptom levels       20 min Neuromuscular Re-education:  [x]  See flow sheet :   Rationale: increase ROM, increase strength, improve coordination and increase proprioception  to improve the patients ability to perform daily activities with decreased pain and symptom levels      25 min Manual Therapy:  LORA AIC correction, Sternal mobilization with active pelvic tilt, Superior T4 (LORA technique), Right subclavius release (LORA technique), LORA infraclavicular rib pump with active breath   Right sibson's release  STM to right lat and subscap  Manual left posterior hip capsule inhibition in left S/L with Right iliac depression   Vacuum cupping in quadruped with breaths to proximal thoracic spine  Obtained consent for hand placement      Rationale: decrease pain, increase ROM, increase tissue extensibility, decrease trigger points and increase postural awareness to improve the patients ability to perform daily activities with decreased pain and symptom levels            With   [] TE   [] TA   [] neuro   [] other: Patient Education: [x] Review HEP    [] Progressed/Changed HEP based on:   [] positioning   [] body mechanics   [] transfers   [] heat/ice application    [] other:      Other Objective/Functional Measures:   LORA Special Tests (pre/post)  HGIR:                                                 Right: 65/82             Left: 70/85  Hip Add Drop Test:                           Right: +/-            Left:+/-  Trunk Rot                                           Right: 16.5in/15in Left 17 in  /15 in  Shoulder Horizontal Abduction          Right: 31 /45             Left: 19 /33     Pain Level (0-10 scale) post treatment: 0    ASSESSMENT/Changes in Function:   With lat hand and rib integration activities noted pronounced over activity of UT. Required tactile cueing to inhibit UT and with UT inhibition decreased pain with lowering right shoulder and with cues to inhibit UT able to press palms together without increasing pain.      Patient will continue to benefit from [facility] PT services to modify and progress therapeutic interventions, address functional mobility deficits, address ROM deficits, address strength deficits, analyze and address soft tissue restrictions, analyze and cue movement patterns, analyze and modify body mechanics/ergonomics, assess and modify postural abnormalities and instruct in home and community integration to attain remaining goals. []  See Plan of Care  []  See progress note/recertification  []  See Discharge Summary         Progress towards goals / Updated goals:  Long Term Goals: LTG- To be accomplished in 6 week(s):  1.  Pt will demonstrate ability to complete pull ups without right shoulder pain to demonstrate improved scapular strength. Eval:pain with 1 pullup, increased thoracic extension and rib flare, challenged with PPT  Last PN:: increased pain over last month in posterior shoulder with inabiliity to complete pull up or push up without pain  Current; unable to complete due to decreased right shoulder AROM 4/23/2020     2.  Pt trunk rotation will improve to at least 15in to demonstrate improvef functional thoracic mobility  Eval: 17in right, 15.5in left   Last PN: 15in left, 16in right  Current: Progressing 5/11/20 15 in bilaterally        3.  Pt right shoulder flexion and IR AROM  will improve to [facility] allow pt to complete ADLs with increased ease. Eval: 134* flexion ,25* IR  Last PN: 35* IR, 135* flexion  Current: 71* IR, 150* flexion at end of session 5/5/20     4.  Pt FOTO score will increase by >/ = 8 points to show improvement in subjective function.   Eval:72  Last PN: 65  Current: will reassess visit 16    PLAN  []  Upgrade activities as tolerated     []  Continue plan of care  []  Update interventions per flow sheet       []  Discharge due to:_  []  Other:_      Thelma Anaya, PT, DPT 5/11/2020  2:45 PM    Future Appointments   Date Time Provider [place] Tsai   5/13/2020  8:00 AM Verner Bunkers, PT, DPT [facility]

## 2020-05-13 ENCOUNTER — HOSPITAL ENCOUNTER (OUTPATIENT)
Dept: PHYSICAL THERAPY | Age: 50
Discharge: HOME | End: 2020-05-13
Payer: OTHER GOVERNMENT

## 2020-05-13 PROCEDURE — 97140 MANUAL THERAPY 1/> REGIONS: CPT

## 2020-05-13 PROCEDURE — 97110 THERAPEUTIC EXERCISES: CPT

## 2020-05-13 PROCEDURE — 97112 NEUROMUSCULAR REEDUCATION: CPT

## 2020-05-13 NOTE — PROGRESS NOTES
[facility] at the [address], [place], [address] [facility]  Phone: [phone]      Fax:  [phone]    Progress Note  Patient name: Bon Flores Start of Care: 2020   Referral source: Geovanna Hicks MD : 1970               Medical Diagnosis: Right shoulder pain [M25.511]  Pain in right arm [M79.601]    Onset Date:3 weeks               Treatment Diagnosis: right shoulder pain   Prior Hospitalization: see medical history Provider#: [id number]   Medications: Verified on Patient summary List    Comorbidities: bilateral knee scope   Prior Level of Function: working out 5-6days a week     Visits from Start of Care: 16    Missed Visits: 0    Progress Towards Goals:   [address]- To be accomplished in 6 week(s):  1.  Pt will demonstrate ability to complete pull ups without right shoulder pain to demonstrate improved scapular strength. Eval:pain with 1 pullup, increased thoracic extension and rib flare, challenged with PPT  Last PN:: increased pain over last month in posterior shoulder with inabiliity to complete pull up or push up without pain  Current: Regression- pain in pull up start position     2.  Pt trunk rotation will improve to at least 15in to demonstrate improvef functional thoracic mobility  Eval: 17in right, 15.5in left   Last PN: 15in left, 16in right  Current: MET 20 15 in bilaterally to start session         3.  Pt right shoulder flexion and IR AROM  will improve to [facility] allow pt to complete ADLs with increased ease. Eval: 134* flexion ,25* IR  Last PN: 35* IR, 135* flexion  Current: 81* IR, 155* flexion at end of session 20 Progressing      4.  Pt FOTO score will increase by >/ = 8 points to show improvement in subjective function. Eval:72  Last PN: 65  Current: will reassess visit 17 - tablet malfunction      Key Functional Changes:   Pt has been seen for 16 visits for right shoulder pain.  Had ~1 month lapse in treatment due to COVID-19 health crisis. In that time had resumption of pain with reaching overhead, right elbow pain and pain with activities such as cooking, washing hands and dressing. 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 Pt has tremendous compensation with UT, increased pec and lat stiffness. Pt has goal of returning to exercise but unable to perform push-up at this time. Pt would benefit from continued skilled PT services to address remaining unmet goals to allow pt to complete ADLs with less pain.        Updated Goals: to be achieved in 8 treatments:   Same as above    ASSESSMENT/RECOMMENDATIONS:  [x]Continue therapy per initial plan/protocol at a frequency of  8 treatments   []Continue therapy with the following recommended changes:_____________________      _____________________________________________________________________  []Discontinue therapy progressing towards or have reached established goals  []Discontinue therapy due to lack of appreciable progress towards goals  []Discontinue therapy due to lack of attendance or compliance  []Await Physician's recommendations/decisions regarding therapy  []Other:________________________________________________________________    Thank you for this referral.   Flower Salinas, PT, DPT 5/13/2020 12:18 PM  NOTE TO PHYSICIAN:  PLEASE COMPLETE THE ORDERS BELOW AND   FAX TO [facility]: [phone]  If you are unable to process this request in 24 hours please contact our office: [phone]        []  I have read the above report and request that my patient continue as recommended.   []  I have read the above report and request that my patient continue therapy with the following changes/special instructions:________________________________________  []I have read the above report and request that my patient be discharged from therapy.     [de-identified] Signature:____________Date:_________TIME:________    [employer], Date and Time must be completed for valid certification **

## 2020-05-13 NOTE — PROGRESS NOTES
PT DAILY TREATMENT NOTE    Patient Name: Ricardo Monk  Date:2020  : 1970  [x]  Patient  Verified  Payor: ANTONY / Plan: Lex Ordo[address]ez [address] / Product Type: Elan Perezneena /    In time:8:00 am   Out time:9:02 am  Total Treatment Time (min): 62  Total Timed Codes (min): 62  Visit #: 16 of 18    Treatment Area: Right shoulder pain [M25.511]  Pain in right arm [M79.601]    SUBJECTIVE  Pain Level (0-10 scale): 1  Any medication changes, allergies to medications, adverse drug reactions, diagnosis change, or new procedure performed?: [x] No    [] Yes (see summary sheet for update)  Subjective functional status/changes:   [] No changes reported  \"It is ok.  It is sore because I slept on it.:    OBJECTIVE    Modalities Rationale:     min [] Estim, type/location:                                      []  att     []  unatt     []  w/US     []  w/ice    []  w/heat    min []  Mechanical Traction: type/lbs                   []  pro   []  sup   []  int   []  cont    []  before manual    []  after manual    min []  Ultrasound, settings/location:      min []  Iontophoresis w/ dexamethasone, location:                                               []  take home patch       []  in clinic    min []  Ice     []  Heat    location/position:     min []  Vasopneumatic Device, press/temp:     min []  Other:    [] Skin assessment post-treatment (if applicable):    []  intact    []  redness- no adverse reaction     []redness - adverse reaction:          26 min Therapeutic Exercise:  [x] See flow sheet :   Rationale: increase ROM, increase strength, improve coordination and increase proprioception to improve the patients ability to perform daily activities with decreased pain and symptom levels     18 min Neuromuscular Re-education:  [x]  See flow sheet :   Rationale: increase ROM, increase strength, improve coordination and increase proprioception  to improve the patients ability to perform daily activities with decreased pain and symptom levels    18 min Manual Therapy:  LORA AIC correction, Sternal mobilization with active pelvic tilt, Superior T4 (LORA technique), Right subclavius release (LORA technique), LORA infraclavicular rib pump with active breath   [place] to right UT to inhibit   Obtained consent for hand placement      Rationale: decrease pain, increase ROM, increase tissue extensibility, decrease trigger points and increase postural awareness to improve the patients ability to perform daily activities with decreased pain and symptom levels            With   [] TE   [] TA   [] neuro   [] other: Patient Education: [x] Review HEP    [] Progressed/Changed HEP based on:   [] positioning   [] body mechanics   [] transfers   [] heat/ice application    [] other:      Other Objective/Functional Measures:   LORA Special Tests (pre/post)  HGIR:                                                 Right: 60/82             Left: 70/85  Shoulder Flexion   Right: 140/155   Hip Add Drop Test:                           Right: midline/-            Left:midline/-  Trunk Rot                                           Right: 15 in/14 in    Left 15 in /14 in  Shoulder Horizontal Abduction          Right: 30 /42             Left: 19 /35     Dyskinesia and painful arc with lowering on the right     Pain Level (0-10 scale) post treatment: 0    ASSESSMENT/Changes in Function:   Pt has been seen for 16 visits for right shoulder pain. Had ~1 month lapse in treatment due to COVID-19 health crisis. In that time had resumption of pain with reaching overhead, right elbow pain and pain with activities such as cooking, washing hands and dressing. 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  Pt has tremendous compensation with UT, increased pec and lat stiffness. Pt has goal of returning to exercise but unable to perform push-up at this time. Pt would benefit from continued skilled PT services to address remaining unmet goals to allow pt to complete ADLs with less pain.        Patient will continue to benefit from skilled PT services to modify and progress therapeutic interventions, address functional mobility deficits, address ROM deficits, address strength deficits, analyze and address soft tissue restrictions, analyze and cue movement patterns, analyze and modify body mechanics/ergonomics, assess and modify postural abnormalities and instruct in home and community integration to attain remaining goals. []  See Plan of Care  []  See progress note/recertification  []  See Discharge Summary         Progress towards goals / Updated goals:  Long Term Goals: LTG- To be accomplished in 6 week(s):  1.  Pt will demonstrate ability to complete pull ups without right shoulder pain to demonstrate improved scapular strength. Eval:pain with 1 pullup, increased thoracic extension and rib flare, challenged with PPT  Last PN:: increased pain over last month in posterior shoulder with inabiliity to complete pull up or push up without pain  Current: Regression- pain in pull up start position     2.  Pt trunk rotation will improve to at least 15in to demonstrate improvef functional thoracic mobility  Eval: 17in right, 15.5in left   Last PN: 15in left, 16in right  Current: MET 5/13/20 15 in bilaterally to start session         3.  Pt right shoulder flexion and IR AROM  will improve to [facility] allow pt to complete ADLs with increased ease. Eval: 134* flexion ,25* IR  Last PN: 35* IR, 135* flexion  Current: 81* IR, 155* flexion at end of session 5/13/20 Progressing      4.  Pt FOTO score will increase by >/ = 8 points to show improvement in subjective function.   Eval:72  Last PN: 65  Current: will reassess visit 17 - tablet malfunction      PLAN  [x]  Upgrade activities as tolerated     []  Continue plan of care  []  Update interventions per flow sheet       []  Discharge due to:_  []  Other:_      Cleve Wolf PT, DPT 5/13/2020  8:19 AM    No future appointments.

## 2020-05-20 ENCOUNTER — HOSPITAL ENCOUNTER (OUTPATIENT)
Dept: PHYSICAL THERAPY | Age: 50
Discharge: HOME | End: 2020-05-20
Payer: OTHER GOVERNMENT

## 2020-05-20 PROCEDURE — 97110 THERAPEUTIC EXERCISES: CPT

## 2020-05-20 PROCEDURE — 97140 MANUAL THERAPY 1/> REGIONS: CPT

## 2020-05-20 PROCEDURE — 97112 NEUROMUSCULAR REEDUCATION: CPT

## 2020-05-20 NOTE — PROGRESS NOTES
PT DAILY TREATMENT NOTE    Patient Name: Kody Cotto  Date:2020  : 1970  [x]  Patient  Verified  Payor:  / Plan: Lex Ordoñez 74 / Product Type:  /    In time:3:30 pm  Out time:4:34 pm   Total Treatment Time (min): 64  Total Timed Codes (min): 64  Visit #: 17 of 24    Treatment Area: Right shoulder pain [M25.511]  Pain in right arm [M79.601]    SUBJECTIVE  Pain Level (0-10 scale): 2  Any medication changes, allergies to medications, adverse drug reactions, diagnosis change, or new procedure performed?: [x] No    [] Yes (see summary sheet for update)  Subjective functional status/changes:   [] No changes reported  \"it is the elbow. \"    OBJECTIVE    Modalities Rationale:        min [] Estim, type/location:                                      []  att     []  unatt     []  w/US     []  w/ice    []  w/heat    min []  Mechanical Traction: type/lbs                   []  pro   []  sup   []  int   []  cont    []  before manual    []  after manual    min []  Ultrasound, settings/location:      min []  Iontophoresis w/ dexamethasone, location:                                               []  take home patch       []  in clinic    min []  Ice     []  Heat    location/position:     min []  Vasopneumatic Device, press/temp:     min []  Other:    [] Skin assessment post-treatment (if applicable):    []  intact    []  redness- no adverse reaction     []redness - adverse reaction:          30 min Therapeutic Exercise:  [x] See flow sheet :   Rationale: increase ROM, increase strength, improve coordination and increase proprioception to improve the patients ability to perform daily activities with decreased pain and symptom levels     20 min Neuromuscular Re-education:  [x]  See flow sheet :   Rationale: increase ROM, increase strength, improve coordination and increase proprioception  to improve the patients ability to . smfunb      14 min Manual Therapy:   Sternal mobilization with active pelvic tilt, Superior T4 (LORA technique), Right subclavius release (LORA technique), LORA infraclavicular rib pump with active breath   STM to subscapularis in supine and S/L with AAROMflex and scap stabilized  STM to infraspinatus in S/L  Obtained consent for hand placement      Rationale: decrease pain, increase ROM, increase tissue extensibility, decrease trigger points and increase postural awareness to improve the patients ability to perform daily activities with decreased pain and symptom levels            With   [] TE   [] TA   [] neuro   [] other: Patient Education: [x] Review HEP    [] Progressed/Changed HEP based on:   [] positioning   [] body mechanics   [] transfers   [] heat/ice application    [] other:      Other Objective/Functional Measures:   LORA Special Tests (pre/post)  HGIR:                                                 Right: 71/83             Left: 75/85  Hip Add Drop Test:                           Right: midline/-         Left:midline/-  Trunk Rot                                           Right: 17 in/15 in Left 17 in /15 i  Shoulder Horizontal Abduction          Right: 35 /43             Left: 31 /41       Pain Level (0-10 scale) post treatment: 0    ASSESSMENT/Changes in Function:   Pt reports increased compliance with HEP in last week and overall decreased pain. intermittent elbow pain. Challenged with asymmetrical planks. WIll be traveling out of town next week resume when returns. Patient will continue to benefit from skilled PT services to modify and progress therapeutic interventions, address functional mobility deficits, address ROM deficits, address strength deficits, analyze and address soft tissue restrictions, analyze and cue movement patterns, analyze and modify body mechanics/ergonomics, assess and modify postural abnormalities, address imbalance/dizziness and instruct in home and community integration to attain remaining goals.      []  See Plan of Care  []  See progress note/recertification  []  See Discharge Summary         Progress towards goals / Updated goals:  Long Term Goals: LTG- To be accomplished in 6 week(s):  1.  Pt will demonstrate ability to complete pull ups without right shoulder pain to demonstrate improved scapular strength. Eval:pain with 1 pullup, increased thoracic extension and rib flare, challenged with PPT  Last PN: Regression- pain in pull up start position  Current:     2.  Pt trunk rotation will improve to at least 15in to demonstrate improvef functional thoracic mobility  Eval: 17in right, 15.5in left   Last PN:  MET 5/13/20 15 in bilaterally to start session         3.  Pt right shoulder flexion and IR AROM  will improve to [facility] allow pt to complete ADLs with increased ease. Eval: 134* flexion ,25* IR  Last PN:  81* IR, 155* flexion at end of session 5/13/20 Progressing      4.  Pt FOTO score will increase by >/ = 8 points to show improvement in subjective function.   Eval:72  Last PN: 65  Current: Progressing 75      PLAN  [x]  Upgrade activities as tolerated     []  Continue plan of care  []  Update interventions per flow sheet       []  Discharge due to:_  []  Other:_      Marilynn Mott PT, DPT 5/20/2020  3:46 PM    Future Appointments   Date Time Provider Tracy Tsai   6/8/2020  1:30 PM Nasrin Aguiar PT, DPT [facility]   6/10/2020  3:30 PM Nasrin Aguiar PT, DPT [facility]   6/15/2020 12:30 PM Nasrin Aguiar PT, DPT [facility]   6/17/2020  2:30 PM Nasrin Aguiar PT, DPT [facility]

## 2020-05-26 ENCOUNTER — APPOINTMENT (OUTPATIENT)
Dept: PHYSICAL THERAPY | Age: 50
End: 2020-05-26
Payer: OTHER GOVERNMENT

## 2020-06-08 ENCOUNTER — HOSPITAL ENCOUNTER (OUTPATIENT)
Dept: PHYSICAL THERAPY | Age: 50
Discharge: HOME | End: 2020-06-08
Payer: OTHER GOVERNMENT

## 2020-06-08 PROCEDURE — 97110 THERAPEUTIC EXERCISES: CPT

## 2020-06-08 PROCEDURE — 97140 MANUAL THERAPY 1/> REGIONS: CPT

## 2020-06-08 PROCEDURE — 97112 NEUROMUSCULAR REEDUCATION: CPT

## 2020-06-08 NOTE — PROGRESS NOTES
PT DAILY TREATMENT NOTE    Patient Name: Ranjith Giron  Date:2020  : 1970  [x]  Patient  Verified  Payor:  / Plan: Lex Ordoñez 74 / Product Type:  /    In time:3:23 pm  Out time:4:20 pm   Total Treatment Time (min): 57  Total Timed Codes (min): 62  Visit #: 18 of 24    Treatment Area: Right shoulder pain [M25.511]  Pain in right arm [M79.601]    SUBJECTIVE  Pain Level (0-10 scale): 0-2  Any medication changes, allergies to medications, adverse drug reactions, diagnosis change, or new procedure performed?: [x] No    [] Yes (see summary sheet for update)  Subjective functional status/changes:   [] No changes reported  \"It was good. It is my elbow when I bend it all the way and when I press my hands together. \"    OBJECTIVE    Modalities Rationale:     min [] Estim, type/location:                                      []  att     []  unatt     []  w/US     []  w/ice    []  w/heat    min []  Mechanical Traction: type/lbs                   []  pro   []  sup   []  int   []  cont    []  before manual    []  after manual    min []  Ultrasound, settings/location:      min []  Iontophoresis w/ dexamethasone, location:                                               []  take home patch       []  in clinic    min []  Ice     []  Heat    location/position:     min []  Vasopneumatic Device, press/temp:     min []  Other:    [] Skin assessment post-treatment (if applicable):    []  intact    []  redness- no adverse reaction     []redness  adverse reaction:        22 min Therapeutic Exercise:  [x]? See flow sheet :   Rationale: increase ROM, increase strength, improve coordination and increase proprioception to improve the patients ability to perform daily activities with decreased pain and symptom levels     20 min Neuromuscular Re-education:  [x]?   See flow sheet :   Rationale: increase ROM, increase strength, improve coordination and increase proprioception  to improve the patients ability to . smfunb        15 min Manual Therapy:   Sternal mobilization with active pelvic tilt, Superior T4 (LORA technique), Right subclavius release (LORA technique), LORA infraclavicular rib pump with active breath   STM to subscapularis and lat in supine   Manual stretching to left posterior hip capsule in S/L    Obtained consent for hand placement       Rationale: decrease pain, increase ROM, increase tissue extensibility, decrease trigger points and increase postural awareness to improve the patients ability to perform daily activities with decreased pain and symptom levels             With   [] TE   [] TA   [] neuro   [] other: Patient Education: [x] Review HEP    [] Progressed/Changed HEP based on:   [] positioning   [] body mechanics   [] transfers   [] heat/ice application    [] other:      Other Objective/Functional Measures:   LORA Special Tests (pre/post)  HGIR:                                                 Right: 55/83             Left: 64/85  Shoulder Flexion   Right: NT/150             Left: NT/154  Hip Add Drop Test:                           Right: +/-            Left:+/midline  Trunk Rot                                           Right: 16.5 in/15in Left 17 in  /15 in  Shoulder Horizontal Abduction          Right: 34 /40             Left: 22 /35    Right Shoulder in standing Abduction:>90* no pain     Standing Forward Flexion: 7 in from ground     Pain Level (0-10 scale) post treatment: 0    ASSESSMENT/Changes in Function:   Pt has not been seen 2 weeks due to travel. Measures improved during session. Able to decrease elbow pain with lower trap facilitation and Upper Trap inhibition.  Plan to add S/L hip lift next session     Patient will continue to benefit from skilled PT services to modify and progress therapeutic interventions, address functional mobility deficits, address ROM deficits, address strength deficits, analyze and address soft tissue restrictions, analyze and cue movement patterns, analyze and modify body mechanics/ergonomics, assess and modify postural abnormalities and instruct in home and community integration to attain remaining goals. []  See Plan of Care  []  See progress note/recertification  []  See Discharge Summary         Progress towards goals / Updated goals:  Long Term Goals: LTG- To be accomplished in 6 week(s):  1.  Pt will demonstrate ability to complete pull ups without right shoulder pain to demonstrate improved scapular strength. Eval:pain with 1 pullup, increased thoracic extension and rib flare, challenged with PPT  Last PN: Regression- pain in pull up start position  Current: progressing 6/8/20 added push-ups from counter height      2.  Pt trunk rotation will improve to at least 15in to demonstrate improvef functional thoracic mobility  Eval: 17in right, 15.5in left   Last PN:  MET 5/13/20 15 in bilaterally to start session         3.  Pt right shoulder flexion and IR AROM  will improve to [facility] allow pt to complete ADLs with increased ease. Eval: 134* flexion ,25* IR  Last PN:  81* IR, 155* flexion at end of session 5/13/20 Progressing      4.  Pt FOTO score will increase by >/ = 8 points to show improvement in subjective function.   Eval:72  Last PN: 65  Current: Progressing 75         PLAN  [x]  Upgrade activities as tolerated     []  Continue plan of care  []  Update interventions per flow sheet       []  Discharge due to:_  []  Other:_      Meredith Flanagan PT, DPT 6/8/2020  3:19 PM    Future Appointments   Date Time Provider Tracy Tsai   6/8/2020  3:30 PM Danielito Izaguirre PT, DPT [facility]   6/11/2020  3:30 PM Danielito Izaguirre PT, DPT [facility]   6/15/2020 12:30 PM Danielito Izaguirre PT, DPT [facility]   6/18/2020  3:30 PM Danielito Izaguirre PT, DPT [facility]

## 2020-06-10 ENCOUNTER — APPOINTMENT (OUTPATIENT)
Dept: PHYSICAL THERAPY | Age: 50
End: 2020-06-10
Payer: OTHER GOVERNMENT

## 2020-06-11 ENCOUNTER — HOSPITAL ENCOUNTER (OUTPATIENT)
Dept: PHYSICAL THERAPY | Age: 50
Discharge: HOME | End: 2020-06-11
Payer: OTHER GOVERNMENT

## 2020-06-11 PROCEDURE — 97112 NEUROMUSCULAR REEDUCATION: CPT

## 2020-06-11 PROCEDURE — 97140 MANUAL THERAPY 1/> REGIONS: CPT

## 2020-06-11 PROCEDURE — 97110 THERAPEUTIC EXERCISES: CPT

## 2020-06-11 NOTE — PROGRESS NOTES
PT DAILY TREATMENT NOTE    Patient Name: Ga Liomn  Date:2020  : 1970  [x]  Patient  Verified  Payor:  / Plan: [facility] / Product Type:  /    In time:3:30 pm  Out time:4:34 pm  Total Treatment Time (min): 60 (waiting on PT)  Total Timed Codes (min): 60   Visit #: 19 of 24    Treatment Area: Right shoulder pain [M25.511]  Pain in right arm [M79.601]    SUBJECTIVE  Pain Level (0-10 scale): 0-2  Any medication changes, allergies to medications, adverse drug reactions, diagnosis change, or new procedure performed?: [x] No    [] Yes (see summary sheet for update)  Subjective functional status/changes:   [] No changes reported  \"It is my elbow. \"    OBJECTIVE    Modalities Rationale:      min [] Estim, type/location:                                      []  att     []  unatt     []  w/US     []  w/ice    []  w/heat    min []  Mechanical Traction: type/lbs                   []  pro   []  sup   []  int   []  cont    []  before manual    []  after manual    min []  Ultrasound, settings/location:      min []  Iontophoresis w/ dexamethasone, location:                                               []  take home patch       []  in clinic    min []  Ice     []  Heat    location/position:     min []  Vasopneumatic Device, press/temp:     min []  Other:    [] Skin assessment post-treatment (if applicable):    []  intact    []  redness- no adverse reaction     []redness  adverse reaction:          25 min Therapeutic Exercise:  [x] See flow sheet :   Rationale: increase ROM, increase strength, improve coordination and increase proprioception to improve the patients ability to perform daily activities with decreased pain and symptom levels       15 min Neuromuscular Re-education:  [x]  See flow sheet :   Rationale: increase ROM, increase strength, improve coordination and increase proprioception  to improve the patients ability to perform daily activities with decreased pain and symptom levels      20 min Manual Therapy:  LORA AIC correction, Sternal mobilization with active pelvic tilt, Superior T4 (LORA technique), Right subclavius release (LORA technique), LORA infraclavicular rib pump with active breath   Fascial stretch to right lat and pec  Manual stretching of left posterior hip capsule in left S/L with Right iliace depression   Obtained consent for hand placement      Rationale: decrease pain, increase ROM, increase tissue extensibility, decrease trigger points and increase postural awareness to improve the patients ability to perform daily activities with decreased pain and symptom levels            With   [] TE   [] TA   [] neuro   [] other: Patient Education: [x] Review HEP    [] Progressed/Changed HEP based on:   [] positioning   [] body mechanics   [] transfers   [] heat/ice application    [] other:      Other Objective/Functional Measures:   LORA Special Tests (pre/post)  HGIR:                                                 Right: 61/82             Left: 85/85  Shoulder Flexion   Right: 125/146             Left: 146/NT  Hip Add Drop Test:                           Right: +/-            Left:+/-  Trunk Rot                                           Right: 16 in/15 in Left 17 in /15 in   Shoulder Horizontal Abduction          Right: 35 /47             Left: 34 /35    Point tender to right medial epicondyle  Pain with MMT of right wrist flexion         Pain Level (0-10 scale) post treatment: 0    ASSESSMENT/Changes in Function:   Suspect pt has some medial epicondylitis due to shoulder and scapular weakness.  Need to work on left oblique facilitation, right pec inhibition as well as lower trap and thoracic retraction      Patient will continue to benefit from skilled PT services to modify and progress therapeutic interventions, address functional mobility deficits, address ROM deficits, address strength deficits, analyze and address soft tissue restrictions, analyze and cue movement patterns, analyze and modify body mechanics/ergonomics, assess and modify postural abnormalities and instruct in home and community integration to attain remaining goals. []  See Plan of Care  []  See progress note/recertification  []  See Discharge Summary         Progress towards goals / Updated goals:  Long Term Goals: LTG- To be accomplished in 6 week(s):  1.  Pt will demonstrate ability to complete pull ups without right shoulder pain to demonstrate improved scapular strength. Eval:pain with 1 pullup, increased thoracic extension and rib flare, challenged with PPT  Last PN: Regression- pain in pull up start position  Current: progressing 6/8/20 added push-ups from counter height      2.  Pt trunk rotation will improve to at least 15in to demonstrate improvef functional thoracic mobility  Eval: 17in right, 15.5in left   Last PN:  MET 5/13/20 15 in bilaterally to start session         3.  Pt right shoulder flexion and IR AROM  will improve to [facility] allow pt to complete ADLs with increased ease. Eval: 134* flexion ,25* IR  Last PN:  81* IR, 155* flexion at end of session 5/13/20 Progressing      4.  Pt FOTO score will increase by >/ = 8 points to show improvement in subjective function.   Eval:72  Last PN: 65  Current: Progressing 75      PLAN  [x]  Upgrade activities as tolerated     []  Continue plan of care  []  Update interventions per flow sheet       []  Discharge due to:_  []  Other:_      Flower Salinas, PT, DPT 6/11/2020  3:58 PM    Future Appointments   Date Time Provider Tracy Tsai   6/15/2020 12:30 PM Noemy Power PT, DPT [facility]   6/18/2020  3:30 PM Noemy Power PT, DPT [facility]

## 2020-06-15 ENCOUNTER — HOSPITAL ENCOUNTER (OUTPATIENT)
Dept: PHYSICAL THERAPY | Age: 50
Discharge: HOME | End: 2020-06-15
Payer: OTHER GOVERNMENT

## 2020-06-15 PROCEDURE — 97110 THERAPEUTIC EXERCISES: CPT

## 2020-06-15 PROCEDURE — 97140 MANUAL THERAPY 1/> REGIONS: CPT

## 2020-06-15 PROCEDURE — 97112 NEUROMUSCULAR REEDUCATION: CPT

## 2020-06-15 NOTE — PROGRESS NOTES
PT DAILY TREATMENT NOTE    Patient Name: Carla Collazo  Date:6/15/2020  : 1970  [x]  Patient  Verified  Payor: ANTONY / Plan: Lex Ordoñez 74 / Product Type:  /    In time:12:25 pm  Out time: 1:35 pm  Total Treatment Time (min): 70  Total Timed Codes (min): 70  Visit #: 20 of 24    Treatment Area: Right shoulder pain [M25.511]  Pain in right arm [M79.601]    SUBJECTIVE  Pain Level (0-10 scale): 0-2  Any medication changes, allergies to medications, adverse drug reactions, diagnosis change, or new procedure performed?: [x] No    [] Yes (see summary sheet for update)  Subjective functional status/changes:   [] No changes reported  \"No change. \"    OBJECTIVE    Modalities Rationale:       min [] Estim, type/location:                                      []  att     []  unatt     []  w/US     []  w/ice    []  w/heat    min []  Mechanical Traction: type/lbs                   []  pro   []  sup   []  int   []  cont    []  before manual    []  after manual    min []  Ultrasound, settings/location:      min []  Iontophoresis w/ dexamethasone, location:                                               []  take home patch       []  in clinic    min []  Ice     []  Heat    location/position:     min []  Vasopneumatic Device, press/temp:     min []  Other:    [] Skin assessment post-treatment (if applicable):    []  intact    []  redness- no adverse reaction     []redness  adverse reaction:          20 min Therapeutic Exercise:  [x] See flow sheet :   Rationale: increase ROM, increase strength, improve coordination and increase proprioception to improve the patients ability to perform daily activities with decreased pain and symptom levels       35 min Neuromuscular Re-education:  [x]  See flow sheet :   Rationale: increase ROM, increase strength, improve coordination and increase proprioception  to improve the patients ability to perform daily activities with decreased pain and symptom levels      15 min Manual Therapy:  LORA AIC correction, Sternal mobilization with active pelvic tilt, Superior T4 (LORA technique), Right subclavius release (LORA technique), LORA infraclavicular rib pump with active breath   Obtained consent for hand placement      Rationale: decrease pain, increase ROM, increase tissue extensibility, decrease trigger points and increase postural awareness to improve the patients ability to perform daily activities with decreased pain and symptom levels            With   [] TE   [] TA   [] neuro   [] other: Patient Education: [x] Review HEP    [] Progressed/Changed HEP based on:   [] positioning   [] body mechanics   [] transfers   [] heat/ice application    [] other:      Other Objective/Functional Measures:   LORA Special Tests (pre/post)  HGIR:                                                 Right: 65/85             Left: 67/85  Shoulder Flexion   Right: NT/150             Left: NT/154  Hip Add Drop Test:                           Right: +/-            Left:+/-  Trunk Rot                                           Right: 16 in/14 in Left 15 in  /14 in  Shoulder Horizontal Abduction          Right: 40 /48             Left: 26 /39    MMT Right Shoulder  Flex: 4+/5  Abd: 4/5  Scaption: 4/5  IR: p! At elbow  ER: 4/5    Point tender to palpation along right medial epicondyle  Pain with MMT of right wrist flexion    Significant lat and pec stiffness bilaterally    Initiated 1/2 kneeling single arm lat pulls - had paint at right elbow, had pt perform lat hand then positioned to facilitate left abs in 1/2 kneeling and tacile cues to lower trap - no pain with second set        Pain Level (0-10 scale) post treatment: 0    ASSESSMENT/Changes in Function:   Pt has been seen 20 visits for right shoulder and elbow pain. Pt had regression in March and April when was not seen in clinic. 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 Pt has tremendous compensation with UT, increased pec and lat stiffness. Pt has goal of returning to exercise but unable to perform push-up at this time. Have been able to alleviate elbow pain with facilitation of lower trap and inhibition of pecs. Progress has been gradual but present. Patient will continue to benefit from skilled PT services to modify and progress therapeutic interventions, address functional mobility deficits, address ROM deficits, address strength deficits, analyze and address soft tissue restrictions, analyze and cue movement patterns, analyze and modify body mechanics/ergonomics, assess and modify postural abnormalities and instruct in home and community integration to attain remaining goals. []  See Plan of Care  [x]  See progress note/recertification  []  See Discharge Summary         Progress towards goals / Updated goals:  Long Term Goals: LTG- To be accomplished in 6 week(s):  1.  Pt will demonstrate ability to complete pull ups without right shoulder pain to demonstrate improved scapular strength. Eval:pain with 1 pullup, increased thoracic extension and rib flare, challenged with PPT  Last PN: Regression- pain in pull up start position  Current: progressing 6/8/20 added push-ups from counter height      2.  Pt trunk rotation will improve to at least 15in to demonstrate improvef functional thoracic mobility  Eval: 17in right, 15.5in left   Last PN:  MET 5/13/20 15 in bilaterally to start session         3.  Pt right shoulder flexion and IR AROM  will improve to CHALO [facility] allow pt to complete ADLs with increased ease.   Eval: 134* flexion ,25* IR  Last PN:  81* IR, 155* flexion at end of session 5/13/20 Progressing   Current: Progressing 6/15/20 85* IR and 155* flexion    4.  Pt FOTO score will increase by >/ = 8 points to show improvement in subjective function.   Eval:72  Last PN: 65  Current: Progressing 75         PLAN  []  Upgrade activities as tolerated     []  Continue plan of care  []  Update interventions per flow sheet       []  Discharge due to:_  []  Other:_      Apryl Barnes, PT, DPT 6/15/2020  12:33 PM    Future Appointments   Date Time Provider Tracy Tsai   6/18/2020  3:30 PM Rosa Blake, PT, DPT [facility]

## 2020-06-15 NOTE — PROGRESS NOTES
[facility] at the [address], [place], [address] [facility]  Phone: [phone]      Fax:  [phone]    Progress Note  Patient name: Bon Flores Start of Care: 2020   Referral source: Mariah Hicks MD : 1970               Medical Diagnosis: Right shoulder pain [M25.511]  Pain in right arm [M79.601]    Onset Date:3 weeks               Treatment Diagnosis: right shoulder pain   Prior Hospitalization: see medical history Provider#: [id number]   Medications: Verified on Patient summary List    Comorbidities: bilateral knee scope   Prior Level of Function: working out 5-6days a week        Visits from Start of Care: 20    Missed Visits: 0    Progress Towards Goals:   [address]- To be accomplished in 6 week(s):  1.  Pt will demonstrate ability to complete pull ups without right shoulder pain to demonstrate improved scapular strength. Eval:pain with 1 pullup, increased thoracic extension and rib flare, challenged with PPT  Last PN: Regression- pain in pull up start position  Current: progressing 20 added push-ups from counter height      2.  Pt trunk rotation will improve to at least 15in to demonstrate improvef functional thoracic mobility  Eval: 17in right, 15.5in left   Last PN:  MET 20 15 in bilaterally to start session         3.  Pt right shoulder flexion and IR AROM  will improve to CHALO [facility] allow pt to complete ADLs with increased ease. Eval: 134* flexion ,25* IR  Last PN:  81* IR, 155* flexion at end of session 20 Progressing   Current: Progressing 6/15/20 85* IR and 155* flexion     4.  Pt FOTO score will increase by >/ = 8 points to show improvement in subjective function. Eval:72  Last PN: 65  Current: Progressing 75         Key Functional Changes:   Pt has been seen 20 visits for right shoulder and elbow pain. Pt had regression in March and April when was not seen in clinic. 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 Pt has tremendous compensation with UT, increased pec and lat stiffness. Pt has goal of returning to exercise but unable to perform push-up at this time. Have been able to alleviate elbow pain with facilitation of lower trap and inhibition of pecs. Progress has been gradual but present.      Updated Goals: to be achieved in 8 treatments:  1. Pt will report no elbow pain with daily activities -such as washing hands, pulling on seat belt - to improve quality of life. ASSESSMENT/RECOMMENDATIONS:  [x]Continue therapy per initial plan/protocol at a frequency of  8 treatments  []Continue therapy with the following recommended changes:_____________________      _____________________________________________________________________  []Discontinue therapy progressing towards or have reached established goals  []Discontinue therapy due to lack of appreciable progress towards goals  []Discontinue therapy due to lack of attendance or compliance  []Await Physician's recommendations/decisions regarding therapy  []Other:________________________________________________________________    Thank you for this referral.   Frank Lay, PT, DPT 6/15/2020 5:19 PM  NOTE TO PHYSICIAN:  PLEASE COMPLETE THE ORDERS BELOW AND   FAX TO [facility]: [phone]  If you are unable to process this request in 24 hours please contact our office: [phone]        []  I have read the above report and request that my patient continue as recommended.   []  I have read the above report and request that my patient continue therapy with the following changes/special instructions:________________________________________  []I have read the above report and request that my patient be discharged from therapy.     [de-identified] Signature:____________Date:_________TIME:________    [facility], Date and Time must be completed for valid certification **

## 2020-06-17 ENCOUNTER — APPOINTMENT (OUTPATIENT)
Dept: PHYSICAL THERAPY | Age: 50
End: 2020-06-17
Payer: OTHER GOVERNMENT

## 2020-06-18 ENCOUNTER — APPOINTMENT (OUTPATIENT)
Dept: PHYSICAL THERAPY | Age: 50
End: 2020-06-18
Payer: OTHER GOVERNMENT

## 2020-09-01 ENCOUNTER — HOSPITAL ENCOUNTER (OUTPATIENT)
Dept: PHYSICAL THERAPY | Age: 50
Discharge: HOME | End: 2020-09-01
Payer: OTHER GOVERNMENT

## 2020-09-01 PROCEDURE — 97530 THERAPEUTIC ACTIVITIES: CPT

## 2020-09-01 PROCEDURE — 97140 MANUAL THERAPY 1/> REGIONS: CPT

## 2020-09-01 NOTE — PROGRESS NOTES
[facility] at the [address], [place], [address] [facility]  Phone: [phone]      Fax:  [phone]    Progress Note  Patient name: Bon Flores Start of Care: 2020   Referral source: Yandy Hicks MD : 1970               Medical Diagnosis: Right shoulder pain [M25.511]  Pain in right arm [M79.601]    Onset Date:3 weeks               Treatment Diagnosis: right shoulder pain   Prior Hospitalization: see medical history Provider#: [id number]   Medications: Verified on Patient summary List    Comorbidities: bilateral knee scope   Prior Level of Function: working out 5-6days a week        Visits from Start of Care: 21    Missed Visits: 0    Progress Towards Goals:   1.  Pt will demonstrate ability to complete pull ups without right shoulder pain to demonstrate improved scapular strength. Eval:pain with 1 pullup, increased thoracic extension and rib flare, challenged with PPT  Last PN: progressing 20 added push-ups from counter height   Current: 20 regression pain in push-up position      2.  Pt trunk rotation will improve to at least 15in to demonstrate improvef functional thoracic mobility  Eval: 17in right, 15.5in left   Last PN:  MET 20 15 in bilaterally to start session      Current:regressiong 17 in bilaterally      3.  Pt right shoulder flexion and IR AROM  will improve to CHALO [facility] allow pt to complete ADLs with increased ease. Eval: 134* flexion ,25* IR  Last PN: Progressing 6/15/20 85* IR and 155* flexion  Current: Regression 20 IR: 49* and flex 126*     4.  Pt FOTO score will increase by >/ = 8 points to show improvement in subjective function. Eval:72  Last PN: Progressing 75       Updated Goals:  1. Pt will report no elbow pain with daily activities -such as washing hands, pulling on seat belt - to improve quality of life.   Last PN: pain with washing hands  Current: pain with sleeping, intermittent with hand washing and seat belt      Key Functional Changes:   Shoulder ROM            Right                Left  Flex                             126                  145  Abd                              112                  130  IR                                 T9                    T8     LORA Special Tests (pre/post)  HGIR:                                                  Right: 49/61                 Left: 54/65  Shoulder Flexion                                 Right: 126/134             Left: 145/NT  Hip Add Drop Test:                               Right: +/midline           Left:+/+  Trunk Rot                                           Right: 17.5 in/16 in  Left 17 in /16 in  Shoulder Horizontal Abduction          Right: 35 /NT             Left: 23 /33     Pain with MMT of right wrist flexion > with elbow flexed than exteded     TTP at wrist flexors on the right      Updated Goals: to be achieved in 8 treatments:   Same as above    ASSESSMENT/RECOMMENDATIONS:  Pt has not been seen in >2 months. Originally was being seen for chronic right shoulder pain. At last visit due to chronicity of pain, pain with MMT, positive RC and labral special tests, suspect intraarticular pathology or possible RC tear. Per pt had MRI and no significant findings. Pt also reports progression of pain to medial right elbow. Suspect developed medial epicondylitis due to overuse and poor shoulder girdle mechanics. Pt has demonstrated regression in ROM, mobility and stability since last PT visit in June. Signs/symptoms consistent with mechanical shoulder pain. Has postural deficits, poor scapular stability, decreased ROM and pain with MMT.  Plan to resume PT and help pt transition to gym based HEP.      [x]Continue therapy per initial plan/protocol at a frequency of  8 treatments  []Continue therapy with the following recommended changes:_____________________      _____________________________________________________________________  []Discontinue therapy progressing towards or have reached established goals  []Discontinue therapy due to lack of appreciable progress towards goals  []Discontinue therapy due to lack of attendance or compliance  []Await Physician's recommendations/decisions regarding therapy  []Other:________________________________________________________________    Thank you for this referral.   Hermila Alvarez, PT, DPT 9/1/2020 6:29 PM  NOTE TO PHYSICIAN:  PLEASE COMPLETE THE ORDERS BELOW AND   FAX TO [facility]: [phone]  If you are unable to process this request in 24 hours please contact our office: [phone]        []  I have read the above report and request that my patient continue as recommended. []  I have read the above report and request that my patient continue therapy with the following changes/special instructions:________________________________________  []I have read the above report and request that my patient be discharged from therapy.     [de-identified] Signature:____________Date:_________TIME:________    [employer], Date and Time must be completed for valid certification **

## 2020-09-01 NOTE — PROGRESS NOTES
PT DAILY TREATMENT NOTE    Patient Name: Leda Foster  Date:2020  : 1970  [x]  Patient  Verified  Payor:  / Plan: Lex Ordoñez 74 / Product Type:  /    In time:4:45 pm  Out time:5:50 pm   Total Treatment Time (min): 65  Total Timed Codes (min): 60  Visit #:     Treatment Area: Right shoulder pain [M25.511]  Pain in right arm [M79.601]    SUBJECTIVE  Pain Level (0-10 scale): 0  Any medication changes, allergies to medications, adverse drug reactions, diagnosis change, or new procedure performed?: [x] No    [] Yes (see summary sheet for update)  Subjective functional status/changes:   [] No changes reported  \"My shoulder and mostly my elbow. \"   Increase in pain with any pull up motion, push up motion, pain with sleeping  MRI on shoulder: per pt no significant findings    OBJECTIVE    Modalities Rationale: decrease pain and inflammation to perform daily activities with decreased pain and symptom levels     min [] Estim, type/location:                                      []  att     []  unatt     []  w/US     []  w/ice    []  w/heat    min []  Mechanical Traction: type/lbs                   []  pro   []  sup   []  int   []  cont    []  before manual    []  after manual    min []  Ultrasound, settings/location:      min []  Iontophoresis w/ dexamethasone, location:                                               []  take home patch       []  in clinic   5 min [x]  Ice massage      []  Heat    location/position:  To right medial elbow in sitting     min []  Vasopneumatic Device, press/temp:     min []  Other:    [] Skin assessment post-treatment (if applicable):    []  intact    []  redness- no adverse reaction     []redness  adverse reaction:          10 min Therapeutic Exercise:  [x] See flow sheet :   Rationale: increase ROM, increase strength, improve coordination and increase proprioception to improve the patients ability to perform daily activities with decreased pain and symptom levels      25 min Therapeutic Activity:  [x]  See flow sheet :reassessment   Rationale: increase ROM, increase strength, improve coordination and increase proprioception  to improve the patients ability to perform daily activities with decreased pain and symptom levels      25 min Manual Therapy:  LORA AIC correction, Sternal mobilization with active pelvic tilt, Superior T4 (LORA technique), Right subclavius release (LORA technique), LORA infraclavicular rib pump with active breath   Instrument augmented STM to right wrist flexors and biceps  Obtained consent for hand placement      Rationale: decrease pain, increase ROM, increase tissue extensibility, decrease trigger points and increase postural awareness to improve the patients ability to perform daily activities with decreased pain and symptom levels            With   [] TE   [] TA   [] neuro   [] other: Patient Education: [x] Review HEP    [] Progressed/Changed HEP based on:   [] positioning   [] body mechanics   [] transfers   [] heat/ice application    [] other:      Other Objective/Functional Measures:   Shoulder ROM Right  Left  Flex   126  145  Abd   112  130  IR   T9  T8    LORA Special Tests (pre/post)  HGIR:                                                 Right: 49/61             Left: 54/65  Shoulder Flexion   Right: 126/134             Left: 145/NT  Hip Add Drop Test:                           Right: +/midline           Left:+/+  Trunk Rot                                           Right: 17.5 in/16 in Left 17 in /16 in  Shoulder Horizontal Abduction          Right: 35 /NT             Left: 23 /33    Pain with MMT of right wrist flexion > with elbow flexed than exteded     TTP at wrist flexors on the right     Decreased apical expansion     Pain Level (0-10 scale) post treatment: 0-2    ASSESSMENT/Changes in Function:   Pt has not been seen in >2 months. Originally was being seen for chronic right shoulder pain.  At last visit due to chronicity of pain, pain with MMT, positive RC and labral special tests, suspect intraarticular pathology or possible RC tear. Per pt had MRI and no significant findings. Pt also reports progression of pain to medial right elbow. Suspect developed medial epicondylitis due to overuse and poor shoulder girdle mechanics. Pt has demonstrated regression in ROM, mobility and stability since last PT visit in June. Signs/symptoms consistent with mechanical shoulder pain. Has postural deficits, poor scapular stability, decreased ROM and pain with MMT. Plan to resume PT and help pt transition to gym based HEP. Patient will continue to benefit from skilled PT services to modify and progress therapeutic interventions, address functional mobility deficits, address ROM deficits, address strength deficits, analyze and address soft tissue restrictions, analyze and cue movement patterns, analyze and modify body mechanics/ergonomics, assess and modify postural abnormalities and instruct in home and community integration to attain remaining goals. []  See Plan of Care  []  See progress note/recertification  []  See Discharge Summary         Progress towards goals / Updated goals:  Long Term Goals: LTG- To be accomplished in 6 week(s):  1.  Pt will demonstrate ability to complete pull ups without right shoulder pain to demonstrate improved scapular strength. Eval:pain with 1 pullup, increased thoracic extension and rib flare, challenged with PPT  Last PN: progressing 6/8/20 added push-ups from counter height   Current: 9/1/20 regression pain in push-up position      2.  Pt trunk rotation will improve to at least 15in to demonstrate improvef functional thoracic mobility  Eval: 17in right, 15.5in left   Last PN:  MET 5/13/20 15 in bilaterally to start session      Current:regressiong 17 in bilaterally      3.  Pt right shoulder flexion and IR AROM  will improve to CHALO [facility] allow pt to complete ADLs with increased ease.   ZHSA: 518* flexion ,25* IR  Last PN: Progressing 6/15/20 85* IR and 155* flexion  Current: Regression 9/1/20 IR: 49* and flex 126*     4.  Pt FOTO score will increase by >/ = 8 points to show improvement in subjective function. Eval:72  Last PN: Progressing 75      Updated Goals:  1. Pt will report no elbow pain with daily activities -such as washing hands, pulling on seat belt - to improve quality of life.   Last PN: pain with washing hands  Current: pain with sleeping, intermittent with hand washing and seat belt      PLAN  [x]  Upgrade activities as tolerated     []  Continue plan of care  []  Update interventions per flow sheet       []  Discharge due to:_  []  Other:_      Prieto Gipson PT, DPT 9/1/2020  4:57 PM    Future Appointments   Date Time Provider [place] Tsai   9/3/2020  5:30 PM Katlin Franco, PT, DPT [facility]   9/8/2020  3:30 PM Marita Bean [facility]   9/10/2020  4:00 PM Katlin Franco, PT, DPT [facility]   9/15/2020  3:30 PM RemMarita stearns [facility]   9/17/2020  3:15 PM Katlin Franco PT, DPT [facility]   9/22/2020  3:30 PM RemayalacMarita [facility]   9/24/2020  3:15 PM RemMarita stearns [facility]

## 2020-09-03 ENCOUNTER — HOSPITAL ENCOUNTER (OUTPATIENT)
Dept: PHYSICAL THERAPY | Age: 50
Discharge: HOME | End: 2020-09-03
Payer: OTHER GOVERNMENT

## 2020-09-03 PROCEDURE — 97112 NEUROMUSCULAR REEDUCATION: CPT

## 2020-09-03 PROCEDURE — 97140 MANUAL THERAPY 1/> REGIONS: CPT

## 2020-09-03 PROCEDURE — 97110 THERAPEUTIC EXERCISES: CPT

## 2020-09-03 NOTE — PROGRESS NOTES
PT DAILY TREATMENT NOTE    Patient Name: Chepe Wahl  Date:9/3/2020  : 1970  [x]  Patient  Verified  Payor:  / Plan: Lex Ordoñez 74 / Product Type:  /    In time:5:07 pm  Out time:6:20 pm   Total Treatment Time (min): 60 (pt early and waiting for PT)  Total Timed Codes (min): 60  Visit #: 22 of 29    Treatment Area: Right shoulder pain [M25.511]  Pain in right arm [M79.601]    SUBJECTIVE  Pain Level (0-10 scale): 0  Any medication changes, allergies to medications, adverse drug reactions, diagnosis change, or new procedure performed?: [x] No    [] Yes (see summary sheet for update)  Subjective functional status/changes:   [] No changes reported  \"It is ok, not too bad. \"    OBJECTIVE    Modalities Rationale:       min [] Estim, type/location:                                      []  att     []  unatt     []  w/US     []  w/ice    []  w/heat    min []  Mechanical Traction: type/lbs                   []  pro   []  sup   []  int   []  cont    []  before manual    []  after manual    min []  Ultrasound, settings/location:      min []  Iontophoresis w/ dexamethasone, location:                                               []  take home patch       []  in clinic    min []  Ice     []  Heat    location/position:     min []  Vasopneumatic Device, press/temp:     min []  Other:    [] Skin assessment post-treatment (if applicable):    []  intact    []  redness- no adverse reaction     []redness  adverse reaction:          20 min Therapeutic Exercise:  [x] See flow sheet :   Rationale: increase ROM, increase strength, improve coordination and increase proprioception to improve the patients ability to perform daily activities with decreased pain and symptom levels       28 min Neuromuscular Re-education:  [x]  See flow sheet :   Rationale: increase ROM, increase strength, improve coordination and increase proprioception  to improve the patients ability to perform daily activities with decreased pain and symptom levels      12 min Manual Therapy:  LORA AIC correction, Sternal mobilization with active pelvic tilt, Superior T4 (LORA technique), Right subclavius release (LORA technique), LORA infraclavicular rib pump with active breath   Obtained consent for hand placement      Rationale: decrease pain, increase ROM and increase tissue extensibility to improve the patients ability to perform daily activities with decreased pain and symptom levels          With   [] TE   [] TA   [] neuro   [] other: Patient Education: [x] Review HEP    [] Progressed/Changed HEP based on:   [] positioning   [] body mechanics   [] transfers   [] heat/ice application    [] other:      Other Objective/Functional Measures:   LORA Special Tests (pre/post)  HGIR:                                                 Right: 59/68             Left: 65/73  Shoulder Flexion   Right: NT/142             Left: NT/145  Hip Add Drop Test:                           Right: +/-            Left:+/midline  Trunk Rot                                           Right: 15.5 in/NT Left 15.5 in /NT  Shoulder Horizontal Abduction          Right: 34 /40             Left: 23 /35        Pain Level (0-10 scale) post treatment: 0    ASSESSMENT/Changes in Function:   Improved ability to inhibit pecs in modified all four belly lift. Very challenged with recruiting hamstrings. Per observation improved flexion in standing. Able to decrease pain with lowering shoulder from flexion with thoracic retraction. Patient will continue to benefit from skilled PT services to modify and progress therapeutic interventions, address functional mobility deficits, address ROM deficits, address strength deficits, analyze and address soft tissue restrictions, analyze and cue movement patterns, analyze and modify body mechanics/ergonomics, assess and modify postural abnormalities and instruct in home and community integration to attain remaining goals.      []  See Plan of Care  []  See progress note/recertification  []  See Discharge Summary         Progress towards goals / Updated goals:  1.  Pt will demonstrate ability to complete pull ups without right shoulder pain to demonstrate improved scapular strength. Eval:pain with 1 pullup, increased thoracic extension and rib flare, challenged with PPT  Last PN: 9/1/20 regression pain in push-up position      2.  Pt trunk rotation will improve to at least 15in to demonstrate improvef functional thoracic mobility  Eval: 17in right, 15.5in left   Last PN:regression 17 in bilaterally      3.  Pt right shoulder flexion and IR AROM  will improve to [facility] allow pt to complete ADLs with increased ease. Eval: 134* flexion ,25* IR  Last PN:  Regression 9/1/20 IR: 49* and flex 126*     4.  Pt FOTO score will increase by >/ = 8 points to show improvement in subjective function. Eval:72  Last PN: Progressing 75       Updated Goals:  1. Pt will report no elbow pain with daily activities -such as washing hands, pulling on seat belt - to improve quality of life. Last PN: pain with sleeping, intermittent with hand washing and seat belt    Current: 9/3/20 continued pain with sleeping.      PLAN  [x]  Upgrade activities as tolerated     []  Continue plan of care  []  Update interventions per flow sheet       []  Discharge due to:_  []  Other:_      Nico Durant PT, DPT 9/3/2020  6:01 PM    Future Appointments   Date Time Provider Tracy Tsai   9/8/2020  3:30 PM Remesic, Cris Knife [facility]   9/10/2020  4:00 PM Amairani Vu PT, DPT [facility]   9/15/2020  3:30 PM Remesic, Cris Knife [facility]   9/17/2020  3:15 PM Amairani Vu PT, DPT [facility]   9/22/2020  3:30 PM Remesic, Cris Knife [facility]   9/24/2020  3:15 PM Remesic, Cris Knife [facility]

## 2020-09-08 ENCOUNTER — HOSPITAL ENCOUNTER (OUTPATIENT)
Dept: PHYSICAL THERAPY | Age: 50
Discharge: HOME | End: 2020-09-08
Payer: OTHER GOVERNMENT

## 2020-09-08 PROCEDURE — 97140 MANUAL THERAPY 1/> REGIONS: CPT

## 2020-09-08 PROCEDURE — 97110 THERAPEUTIC EXERCISES: CPT

## 2020-09-08 PROCEDURE — 97112 NEUROMUSCULAR REEDUCATION: CPT

## 2020-09-08 NOTE — PROGRESS NOTES
PT DAILY TREATMENT NOTE    Patient Name: Enid Gowers  Date:2020  : 1970  [x]  Patient  Verified  Payor:  / Plan: Lex Ordoñez 74 / Product Type:  /    In time:3:30  Out time:4:35  Total Treatment Time (min): 65  Total Timed Codes (min): 65  1:1 Treatment Time ( [address] only): 65   Visit #: 23 of 29    Treatment Area: Right shoulder pain [M25.511]  Pain in right arm [M79.601]    SUBJECTIVE  Pain Level (0-10 scale): 0  Any medication changes, allergies to medications, adverse drug reactions, diagnosis change, or new procedure performed?: [x] No    [] Yes (see summary sheet for update)  Subjective functional status/changes:   [] No changes reported  \"Still wake up in pain. I am trying really hard to not lay on my stomach. \"    OBJECTIVE      35 min Therapeutic Exercise:  [x] See flow sheet :   Rationale: increase ROM and increase strength to improve the patients ability to perform daily activities with decreased pain and symptom levels     20 min Neuromuscular Re-education:  [x]  See flow sheet :   Rationale: increase strength, improve coordination and increase proprioception  to improve the patients ability to perform daily activities with decreased pain and symptom levels    10 min Manual Therapy:  Manual right pec stretch, rib mobs with breaths, cupping to lats in s/l with active shoulder flexion, STM to lats and subscap in s/l   Rationale: decrease pain, increase ROM and increase tissue extensibility to improve the patients ability to perform daily activities with decreased pain and symptom levels          With   [] TE   [] TA   [] neuro   [] other: Patient Education: [x] Review HEP    [] Progressed/Changed HEP based on:   [] positioning   [] body mechanics   [] transfers   [] heat/ice application    [] other:      Other Objective/Functional Measures:   LORA Special Tests (pre/post)  HGIR:                                                 Right: 40*/45*             Left: 50*/NT  Shoulder Flexion   Right: 147*/155*             Left: 150*/160*  Shoulder Horizontal Abduction          Right: 125* /NT             Left: 135* /NT     Pain Level (0-10 scale) post treatment: 0    ASSESSMENT/Changes in Function: Pt tolerated session well with most challenged with prone exercises and tricep bridges due to decreased lower trap strength. Tactile cues needed to facitlate rhomboids and lower trap with prone retraction and lift off in all directions especially scaption. Right shoulder AROM is improving however still compensation with trunk. Patient will continue to benefit from skilled PT services to modify and progress therapeutic interventions, address functional mobility deficits, address ROM deficits, address strength deficits, analyze and address soft tissue restrictions, analyze and cue movement patterns, analyze and modify body mechanics/ergonomics, assess and modify postural abnormalities and instruct in home and community integration to attain remaining goals. []  See Plan of Care  []  See progress note/recertification  []  See Discharge Summary         Progress towards goals / Updated goals:  1.  Pt will demonstrate ability to complete pull ups without right shoulder pain to demonstrate improved scapular strength. Eval:pain with 1 pullup, increased thoracic extension and rib flare, challenged with PPT  Last PN: 9/1/20 regression pain in push-up position   Current: not able to complete push up without pain      2.  Pt trunk rotation will improve to at least 15in to demonstrate improvef functional thoracic mobility  Eval: 17in right, 15.5in left   Last PN:regression 17 in bilaterally      3.  Pt right shoulder flexion and IR AROM  will improve to [facility] allow pt to complete ADLs with increased ease.   Eval: 134* flexion ,25* IR  Last PN:  Regression 9/1/20 IR: 49* and flex 126*  Current: flexion 155* post session progressing 9/8/2020     4.  Pt FOTO score will increase by >/ = 8 points to show improvement in subjective function. Eval:72  Last PN: Progressing 75       Updated Goals:  1. Pt will report no elbow pain with daily activities -such as washing hands, pulling on seat belt - to improve quality of life. Last PN: pain with sleeping, intermittent with hand washing and seat belt    Current: 9/3/20 continued pain with sleeping.      PLAN  [x]  Upgrade activities as tolerated     [x]  Continue plan of care  []  Update interventions per flow sheet       []  Discharge due to:_  []  Other:_      Joshua Solid 9/8/2020  3:35 PM    Future Appointments   Date Time Provider Tracy Tsai   9/10/2020  4:00 PM Elizabeth Ballard PT, DPT [facility]   9/15/2020  3:30 PM Remesic, [place] Varina Parish [facility]   9/17/2020  3:15 PM Elizabeth Ballard PT, DPT [facility]   9/22/2020  3:30 PM Remesic, Vinh Parish [facility]   9/24/2020  3:15 PM Remesic, Vinh Parish [facility]

## 2020-09-10 ENCOUNTER — HOSPITAL ENCOUNTER (OUTPATIENT)
Dept: PHYSICAL THERAPY | Age: 50
Discharge: HOME | End: 2020-09-10
Payer: OTHER GOVERNMENT

## 2020-09-10 PROCEDURE — 97112 NEUROMUSCULAR REEDUCATION: CPT

## 2020-09-10 PROCEDURE — 97140 MANUAL THERAPY 1/> REGIONS: CPT

## 2020-09-10 PROCEDURE — 97110 THERAPEUTIC EXERCISES: CPT

## 2020-09-10 NOTE — PROGRESS NOTES
PT DAILY TREATMENT NOTE    Patient Name: Vincenzo Landau  Date:9/10/2020  : 1970  [x]  Patient  Verified  Payor:  / Plan: Lex Ordoñez 74 / Product Type:  /    In time:4:00 pm  Out time:5:05 pm  Total Treatment Time (min): 65  Total Timed Codes (min): 65  Visit #: 24 of 29    Treatment Area: Right shoulder pain [M25.511]  Pain in right arm [M79.601]    SUBJECTIVE  Pain Level (0-10 scale): 0  Any medication changes, allergies to medications, adverse drug reactions, diagnosis change, or new procedure performed?: [x] No    [] Yes (see summary sheet for update)  Subjective functional status/changes:   [] No changes reported  \"It is getting better. \"    OBJECTIVE    Modalities Rationale:   min [] Estim, type/location:                                      []  att     []  unatt     []  w/US     []  w/ice    []  w/heat    min []  Mechanical Traction: type/lbs                   []  pro   []  sup   []  int   []  cont    []  before manual    []  after manual    min []  Ultrasound, settings/location:      min []  Iontophoresis w/ dexamethasone, location:                                               []  take home patch       []  in clinic    min []  Ice     []  Heat    location/position:     min []  Vasopneumatic Device, press/temp:     min []  Other:    [] Skin assessment post-treatment (if applicable):    []  intact    []  redness- no adverse reaction     []redness  adverse reaction:          20 min Therapeutic Exercise:  [x] See flow sheet :   Rationale: increase ROM, increase strength, improve coordination and increase proprioception to improve the patients ability to perform daily activities with decreased pain and symptom levels        30 min Neuromuscular Re-education:  [x]  See flow sheet :   Rationale: increase ROM, increase strength, improve coordination and increase proprioception  to improve the patients ability to perform daily activities with decreased pain and symptom levels    15 min Manual Therapy:  LORA AIC correction, Sternal mobilization with active pelvic tilt, Superior T4 (LORA technique), Right subclavius release (LORA technique), LORA infraclavicular rib pump with active breath   Instrument augmented STM to right wrist flexors at proximal insertion   Obtained consent for hand placement      Rationale: decrease pain, increase ROM, increase tissue extensibility and increase postural awareness to improve the patients ability to perform daily activities with decreased pain and symptom levels          With   [] TE   [] TA   [] neuro   [] other: Patient Education: [x] Review HEP    [] Progressed/Changed HEP based on:   [] positioning   [] body mechanics   [] transfers   [] heat/ice application    [] other:      Other Objective/Functional Measures:   LORA Special Tests (pre/post)  HGIR:                                                 Right: 63/80             Left: 71/85  Shoulder Flexion   Right: 142/146             Left: 145/NT  Hip Add Drop Test:                           Right: +/-            Left:+/+  Trunk Rot                                           Right: 16.5 in/NT Left 15 in /NT  Shoulder Horizontal Abduction          Right: 40 /45             Left: 24 /39       Pain Level (0-10 scale) post treatment: 0    ASSESSMENT/Changes in Function:   Pt responded very well to lat and pec inhibition. Was very challenged engaging hamstrings, but had visible oblique fatigue following T8 ext after facilitation of hamstrings. Continued TTP at right medial epicondyle.      Patient will continue to benefit from skilled PT services to modify and progress therapeutic interventions, address functional mobility deficits, address ROM deficits, address strength deficits, analyze and address soft tissue restrictions, analyze and cue movement patterns, analyze and modify body mechanics/ergonomics, assess and modify postural abnormalities and instruct in home and community integration to attain remaining goals. []  See Plan of Care  []  See progress note/recertification  []  See Discharge Summary         Progress towards goals / Updated goals:  1.  Pt will demonstrate ability to complete pull ups without right shoulder pain to demonstrate improved scapular strength. Eval:pain with 1 pullup, increased thoracic extension and rib flare, challenged with PPT  Last PN: 9/1/20 regression pain in push-up position   Current: not able to complete push up without pain, but was able to perform inverted rows without pain 9/10/20      2.  Pt trunk rotation will improve to at least 15in to demonstrate improvef functional thoracic mobility  Eval: 17in right, 15.5in left   Last PN:regression 17 in bilaterally  Current: Progressing 9/10/20 16 in Right 15 in Left      3.  Pt right shoulder flexion and IR AROM  will improve to [facility]AL[facility] allow pt to complete ADLs with increased ease. Eval: 134* flexion ,25* IR  Last PN:  Regression 9/1/20 IR: 49* and flex 126*  Current: flexion 155* post session progressing 9/8/2020     4.  Pt FOTO score will increase by >/ = 8 points to show improvement in subjective function. Eval:72  Last PN: Progressing 75       Updated Goals:  1. Pt will report no elbow pain with daily activities -such as washing hands, pulling on seat belt - to improve quality of life.   Last PN: pain with sleeping, intermittent with hand washing and seat belt    Current: 9/3/20 continued pain with sleeping.        PLAN  [x]  Upgrade activities as tolerated     []  Continue plan of care  []  Update interventions per flow sheet       []  Discharge due to:_  []  Other:_      Annia Adame, PT, DPT 9/10/2020  4:10 PM    Future Appointments   Date Time Provider Tracy Tsai   9/15/2020  3:30 PM Zia BeanHPNARCISO [facility]   9/17/2020  3:15 PM Lizette Flores, PT, DPT [facility]   9/22/2020  3:30 PM RemZia stearnsHPTBMIGUEL [facility]   9/24/2020  3:15 PM Zia BeanHPTBMIGUEL [facility]

## 2020-09-15 ENCOUNTER — HOSPITAL ENCOUNTER (OUTPATIENT)
Dept: PHYSICAL THERAPY | Age: 50
Discharge: HOME | End: 2020-09-15
Payer: OTHER GOVERNMENT

## 2020-09-15 PROCEDURE — 97112 NEUROMUSCULAR REEDUCATION: CPT

## 2020-09-15 PROCEDURE — 97110 THERAPEUTIC EXERCISES: CPT

## 2020-09-15 PROCEDURE — 97140 MANUAL THERAPY 1/> REGIONS: CPT

## 2020-09-15 NOTE — PROGRESS NOTES
PT DAILY TREATMENT NOTE    Patient Name: Alexandra Reach  Date:9/15/2020  : 1970  [x]  Patient  Verified  Payor:  / Plan: Lex Ordoñez 74 / Product Type:  /    In time:3:20  Out time:4:35  Total Treatment Time (min): 75  Total Timed Codes (min): 75  1:1 Treatment Time ([facility] only): 75   Visit #: 25 of 29    Treatment Area: Right shoulder pain [M25.511]  Pain in right arm [M79.601]    SUBJECTIVE  Pain Level (0-10 scale): 0  Any medication changes, allergies to medications, adverse drug reactions, diagnosis change, or new procedure performed?: [x] No    [] Yes (see summary sheet for update)  Subjective functional status/changes:   [] No changes reported  \"My elbow still hurts but not like it was. \":    OBJECTIVE    38 min Therapeutic Exercise:  [x] See flow sheet :   Rationale: increase ROM and increase strength to improve the patients ability to perform daily activities with decreased pain and symptom levels     25 min Neuromuscular Re-education:  [x]  See flow sheet :   Rationale: improve coordination, improve balance and increase proprioception  to improve the patients ability to perform daily activities with decreased pain and symptom levels    12 min Manual Therapy:  Cupping to thoracic paraspinals with QP SA, lats with prayer stretch, posterior capsule right shoulder with active shoulder flexion     Rationale: decrease pain, increase ROM and increase tissue extensibility to improve the patients ability to perform daily activities with decreased pain and symptom levels            With   [] TE   [] TA   [] neuro   [] other: Patient Education: [x] Review HEP    [] Progressed/Changed HEP based on:   [] positioning   [] body mechanics   [] transfers   [] heat/ice application    [] other:      Other Objective/Functional Measures:   LORA Special Tests (pre/post)  HGIR:                                                 Right: 30*/42*            Left: 45*/NT  Shoulder Flexion   Right: 150*/160*             Left: 153*/NT    shoulder abduction 180* left, 130* right pre, right post 145*      Pain Level (0-10 scale) post treatment: 0    ASSESSMENT/Changes in Function: Pt reporting overall elbow pain is improving with AROM improving in right shoulder as well however continued rib flare noted with shoulder flexion. Challenged with prone scapular retraction today with cues to decrease UE assist. Pt having difficulty facilitating left obliques with bosu cross connect and 1/2 kneeling PNF however improved recruitment of HS today with 90/90. Patient will continue to benefit from skilled PT services to modify and progress therapeutic interventions, address functional mobility deficits, address ROM deficits, address strength deficits, analyze and address soft tissue restrictions, analyze and cue movement patterns, analyze and modify body mechanics/ergonomics, assess and modify postural abnormalities and instruct in home and community integration to attain remaining goals. []  See Plan of Care  []  See progress note/recertification  []  See Discharge Summary         Progress towards goals / Updated goals:  1.  Pt will demonstrate ability to complete pull ups without right shoulder pain to demonstrate improved scapular strength. Eval:pain with 1 pullup, increased thoracic extension and rib flare, challenged with PPT  Last PN: 9/1/20 regression pain in push-up position   Current: not able to complete push up without pain, but was able to perform inverted rows without pain 9/10/20      2.  Pt trunk rotation will improve to at least 15in to demonstrate improvef functional thoracic mobility  Eval: 17in right, 15.5in left   Last PN:regression 17 in bilaterally  Current: Progressing 9/10/20 16 in Right 15 in Left      3.  Pt right shoulder flexion and IR AROM  will improve to [facility] allow pt to complete ADLs with increased ease.   Eval: 134* flexion ,25* IR  Last PN:  Regression 9/1/20 IR: 49* and flex 126*  Current: flexion 160* post session, IR 42* progressing 9/15/2020     4.  Pt FOTO score will increase by >/ = 8 points to show improvement in subjective function. Eval:72  Last PN: Progressing 75       Updated Goals:  1. Pt will report no elbow pain with daily activities -such as washing hands, pulling on seat belt - to improve quality of life.   Last PN: pain with sleeping, intermittent with hand washing and seat belt    Current: pt reporting less pain in elbow with ADLs  Progressing 9/15       PLAN  []  Upgrade activities as tolerated     [x]  Continue plan of care  []  Update interventions per flow sheet       []  Discharge due to:_  []  Other:_      Karyle Mortimer Remesic 9/15/2020  3:39 PM    Future Appointments   Date Time Provider Tracy Tsai   9/17/2020  3:15 PM Liana Castro, PT, DPT [facility]   9/22/2020  3:30 PM Remesic, Karyle Mortimer [facility]   9/24/2020  3:15 PM Remesic, Karyle Mortimer [facility]

## 2020-09-17 ENCOUNTER — HOSPITAL ENCOUNTER (OUTPATIENT)
Dept: PHYSICAL THERAPY | Age: 50
Discharge: HOME | End: 2020-09-17
Payer: OTHER GOVERNMENT

## 2020-09-17 PROCEDURE — 97110 THERAPEUTIC EXERCISES: CPT

## 2020-09-17 PROCEDURE — 97112 NEUROMUSCULAR REEDUCATION: CPT

## 2020-09-17 PROCEDURE — 97140 MANUAL THERAPY 1/> REGIONS: CPT

## 2020-09-17 NOTE — PROGRESS NOTES
PT DAILY TREATMENT NOTE    Patient Name: Leda Foster  Date:2020  : 1970  [x]  Patient  Verified  Payor:  / Plan: Lex Ordoñez 74 / Product Type:  /    In time:3:15 pm  Out time:4:03 pm   Total Treatment Time (min): 48  Total Timed Codes (min): 48  Visit #: 26 of 29    Treatment Area: Right shoulder pain [M25.511]  Pain in right arm [M79.601]    SUBJECTIVE  Pain Level (0-10 scale): 2   Any medication changes, allergies to medications, adverse drug reactions, diagnosis change, or new procedure performed?: [x] No    [] Yes (see summary sheet for update)  Subjective functional status/changes:   [] No changes reported  \"The elbow is definitely better. The shoulder is getting there. \"    OBJECTIVE      20 min Therapeutic Exercise:  [x] See flow sheet :   Rationale: increase ROM, increase strength, improve coordination and increase proprioception to improve the patients ability to perform daily activities with decreased pain and symptom levels       20 min Neuromuscular Re-education:  [x]  See flow sheet :   Rationale: increase ROM, increase strength, improve coordination and increase proprioception  to improve the patients ability to perform daily activities with decreased pain and symptom levels      8 min Manual Therapy: ternal mobilization with active pelvic tilt, Superior T4 (LORA technique), Right subclavius release (LORA technique), LORA infraclavicular rib pump with active breath   Obtained consent for hand placement      Rationale: decrease pain, increase ROM, increase tissue extensibility and increase postural awareness to improve the patients ability to perform daily activities with decreased pain and symptom levels            With   [] TE   [] TA   [] neuro   [] other: Patient Education: [x] Review HEP    [] Progressed/Changed HEP based on:   [] positioning   [] body mechanics   [] transfers   [] heat/ice application    [] other:      Other Objective/Functional Measures:   LORA Special Tests (pre/post)  HGIR:                                                 Right: 57/73             Left: 70/80  Shoulder Flexion   Right: NT/147             Left: NT/152  Hip Add Drop Test:                           Right: +/-            Left:+/midline   Trunk Rot                                           Right: 15.5 in/15 in Left 16 in /15 in  Shoulder Horizontal Abduction          Right: 34 /40             Left: 24 /35     Pain Level (0-10 scale) post treatment: 0    ASSESSMENT/Changes in Function:   Pt able to complete 9 push-ups with good form and no pain. Reviewed inverted rows for gym exercise. Responded well to T8 ext and supine Right HG IR and Left HG ER with breath. Patient will continue to benefit from skilled PT services to modify and progress therapeutic interventions, address functional mobility deficits, address ROM deficits, address strength deficits, analyze and address soft tissue restrictions, analyze and cue movement patterns, analyze and modify body mechanics/ergonomics, assess and modify postural abnormalities and instruct in home and community integration to attain remaining goals. []  See Plan of Care  []  See progress note/recertification  []  See Discharge Summary         Progress towards goals / Updated goals:  1.  Pt will demonstrate ability to complete pull ups without right shoulder pain to demonstrate improved scapular strength.   Eval:pain with 1 pullup, increased thoracic extension and rib flare, challenged with PPT  Last PN: 9/1/20 regression pain in push-up position   Current: Progressing 9/17/20able to complete 9 pushups and 10 inverted rows with no pain      2.  Pt trunk rotation will improve to at least 15in to demonstrate improvef functional thoracic mobility  Eval: 17in right, 15.5in left   Last PN:regression 17 in bilaterally  Current: Progressing 9/10/20 16 in Right 15 in Left      3.  Pt right shoulder flexion and IR AROM  will improve to [facility] allow pt to complete ADLs with increased ease. Eval: 134* flexion ,25* IR  Last PN:  Regression 9/1/20 IR: 49* and flex 126*  Current: flexion 160* post session, IR 42* progressing 9/15/2020     4.  Pt FOTO score will increase by >/ = 8 points to show improvement in subjective function. Eval:72  Last PN: Progressing 75       Updated Goals:  1. Pt will report no elbow pain with daily activities -such as washing hands, pulling on seat belt - to improve quality of life.   Last PN: pain with sleeping, intermittent with hand washing and seat belt    Current: pt reporting less pain in elbow with ADLs  Progressing 9/15    PLAN  [x]  Upgrade activities as tolerated     []  Continue plan of care  []  Update interventions per flow sheet       []  Discharge due to:_  []  Other:_      Luci Cuba, PT, DPT 9/17/2020  3:28 PM    Future Appointments   Date Time Provider [place] Tsai   9/22/2020  3:30 PM Rock Ashley Bean [facility]   9/24/2020  3:15 PM Rock Ashley Bean THE [facility]

## 2020-09-22 ENCOUNTER — HOSPITAL ENCOUNTER (OUTPATIENT)
Dept: PHYSICAL THERAPY | Age: 50
Discharge: HOME | End: 2020-09-22
Payer: OTHER GOVERNMENT

## 2020-09-22 PROCEDURE — 97110 THERAPEUTIC EXERCISES: CPT

## 2020-09-22 PROCEDURE — 97140 MANUAL THERAPY 1/> REGIONS: CPT

## 2020-09-22 PROCEDURE — 97112 NEUROMUSCULAR REEDUCATION: CPT

## 2020-09-22 NOTE — PROGRESS NOTES
PT DAILY TREATMENT NOTE    Patient Name: Marlaine Brunner  Date:2020  : 1970  [x]  Patient  Verified  Payor:  / Plan: Lex Ordoñez 74 / Product Type:  /    In time:3:20  Out time:4:25  Total Treatment Time (min): 65  Total Timed Codes (min): 65  1:1 Treatment Time ([address] only): 65   Visit #:  of 29    Treatment Area: Right shoulder pain [M25.511]  Pain in right arm [M79.601]    SUBJECTIVE  Pain Level (0-10 scale): 0  Any medication changes, allergies to medications, adverse drug reactions, diagnosis change, or new procedure performed?: [x] No    [] Yes (see summary sheet for update)  Subjective functional status/changes:   [] No changes reported  \"Still hurts when I sleep. I start on my side but then I wake up on my stomach and my shoulder is killing me. \"    OBJECTIVE      42 min Therapeutic Exercise:  [x] See flow sheet :   Rationale: increase ROM and increase strength to improve the patients ability to perform daily activities with decreased pain and symptom levels     15 min Neuromuscular Re-education:  [x]  See flow sheet :   Rationale: increase strength, improve coordination and increase proprioception  to improve the patients ability to perform daily activities with decreased pain and symptom levels    8 min Manual Therapy:  STM to right wrist extensors and flexors, aleksandra mobs with breaths    Rationale: decrease pain, increase ROM and increase tissue extensibility to improve the patients ability to perform daily activities with decreased pain and symptom levels          With   [] TE   [] TA   [] neuro   [] other: Patient Education: [x] Review HEP    [] Progressed/Changed HEP based on:   [] positioning   [] body mechanics   [] transfers   [] heat/ice application    [] other:      Other Objective/Functional Measures:   Trunk rotation 16in left, 15.5in   HGGIR 52* right, 55* left   Shoulder flexion: 152* right, 160* left     Pain Level (0-10 scale) post treatment: 0    ASSESSMENT/Changes in Function: Pt reporting overall right elbow pain improving with less TTP today. Able to complete pull up in clinic with depressing scapula first however very challenged with maintaining position for > 1 rep. Right shoulder ROM improved post session today as well. Patient will continue to benefit from skilled PT services to modify and progress therapeutic interventions, address functional mobility deficits, address ROM deficits, address strength deficits, analyze and address soft tissue restrictions, analyze and cue movement patterns, analyze and modify body mechanics/ergonomics, assess and modify postural abnormalities and instruct in home and community integration to attain remaining goals. []  See Plan of Care  []  See progress note/recertification  []  See Discharge Summary         Progress towards goals / Updated goals:  1.  Pt will demonstrate ability to complete pull ups without right shoulder pain to demonstrate improved scapular strength. Eval:pain with 1 pullup, increased thoracic extension and rib flare, challenged with PPT  Last PN: 9/1/20 regression pain in push-up position   Current: able to complete 1 pull up today without pain in clinic progressing 9/22/2020     2.  Pt trunk rotation will improve to at least 15in to demonstrate improvef functional thoracic mobility  Eval: 17in right, 15.5in left   Last PN:regression 17 in bilaterally  Current: Progressing 9/10/20 16 in Right 15 in Left      3.  Pt right shoulder flexion and IR AROM  will improve to [facility] allow pt to complete ADLs with increased ease. Eval: 134* flexion ,25* IR  Last PN:  Regression 9/1/20 IR: 49* and flex 126*  Current: 152* shoulder flexion, IR 52* progressing 9/22/2020     4.  Pt FOTO score will increase by >/ = 8 points to show improvement in subjective function. Eval:72  Last PN:  75    Current: 71 slight regression      Updated Goals:  1.  Pt will report no elbow pain with daily activities -such as washing hands, pulling on seat belt - to improve quality of life.   Last PN: pain with sleeping, intermittent with hand washing and seat belt    Current: still pain with sleeping, pt reporting less pain in right elbow with putting on seatbelt, less tender to palpation progressing        PLAN  []  Upgrade activities as tolerated     [x]  Continue plan of care  []  Update interventions per flow sheet       []  Discharge due to:_  []  Other:_      Miriam Bean 9/22/2020  3:21 PM    Future Appointments   Date Time Provider [place] Tsai   9/22/2020  3:30 PM Miriam Bean [facility]   9/24/2020  3:15 PM Miriam Bean [facility]

## 2020-09-24 ENCOUNTER — HOSPITAL ENCOUNTER (OUTPATIENT)
Dept: PHYSICAL THERAPY | Age: 50
Discharge: HOME | End: 2020-09-24
Payer: OTHER GOVERNMENT

## 2020-09-24 PROCEDURE — 97140 MANUAL THERAPY 1/> REGIONS: CPT

## 2020-09-24 PROCEDURE — 97110 THERAPEUTIC EXERCISES: CPT

## 2020-09-24 PROCEDURE — 97112 NEUROMUSCULAR REEDUCATION: CPT

## 2020-09-24 NOTE — PROGRESS NOTES
PT DAILY TREATMENT NOTE    Patient Name: Ivy Del Angel  Date:2020  : 1970  [x]  Patient  Verified  Payor:  / Plan: Lex Ordoñez 74 / Product Type:  /    In time:3:05  Out time:4:15  Total Treatment Time (min): 70  Total Timed Codes (min): 70  1:1 Treatment Time ( [address] only): 70   Visit #: 28 of 29    Treatment Area: Right shoulder pain [M25.511]  Pain in right arm [M79.601]    SUBJECTIVE  Pain Level (0-10 scale): 0  Any medication changes, allergies to medications, adverse drug reactions, diagnosis change, or new procedure performed?: [x] No    [] Yes (see summary sheet for update)  Subjective functional status/changes:   [] No changes reported  \"Better. I have been doing everything you said to at the gym. \"    OBJECTIVE    38 min Therapeutic Exercise:  [x] See flow sheet :   Rationale: increase ROM and increase strength to improve the patients ability to perform daily activities with decreased pain and symptom levels    20 min Neuromuscular Re-education:  [x]  See flow sheet :   Rationale: increase strength, improve coordination and increase proprioception  to improve the patients ability to perform daily activities with decreased pain and symptom levels    12 min Manual Therapy:  scap mobs and PNF AA in s/l, cupping to right lats in s/l with active shoulder flexion, rhoboids in QP with active SA, and anterior/middle deltoid with standing shoulder flexion and scaption    Rationale: decrease pain, increase ROM and increase tissue extensibility to improve the patients ability to perform daily activities with decreased pain and symptom levels          With   [] TE   [] TA   [] neuro   [] other: Patient Education: [x] Review HEP    [] Progressed/Changed HEP based on:   [] positioning   [] body mechanics   [] transfers   [] heat/ice application    [] other:      Other Objective/Functional Measures:   HGGIR: right 42*, 50* (post session right 50*, left 58*)  Shoulder flexion: 152* bilaterally      Pain Level (0-10 scale) post treatment: 0    ASSESSMENT/Changes in Function: Pt tolerated session well with no shoulder pain with decreasing rib flare. Able to complete side plank lift with clams with improved oblique recruitment today. Still needning cues to faciliate HS and abs with QP exercises. Patient will continue to benefit from skilled PT services to modify and progress therapeutic interventions, address functional mobility deficits, address ROM deficits, address strength deficits, analyze and address soft tissue restrictions, analyze and cue movement patterns, analyze and modify body mechanics/ergonomics, assess and modify postural abnormalities and instruct in home and community integration to attain remaining goals. []  See Plan of Care  []  See progress note/recertification  []  See Discharge Summary         Progress towards goals / Updated goals:  1.  Pt will demonstrate ability to complete pull ups without right shoulder pain to demonstrate improved scapular strength. Eval:pain with 1 pullup, increased thoracic extension and rib flare, challenged with PPT  Last PN: 9/1/20 regression pain in push-up position   Current: able to complete 1 pull up today without pain in clinic progressing 9/22/2020     2.  Pt trunk rotation will improve to at least 15in to demonstrate improvef functional thoracic mobility  Eval: 17in right, 15.5in left   Last PN:regression 17 in bilaterally  Current: Progressing 9/10/20 16 in Right 15 in Left      3.  Pt right shoulder flexion and IR AROM  will improve to [facility] allow pt to complete ADLs with increased ease. Eval: 134* flexion ,25* IR  Last PN:  Regression 9/1/20 IR: 49* and flex 126*  Current: 152* shoulder flexion, IR 52* progressing 9/22/2020     4.  Pt FOTO score will increase by >/ = 8 points to show improvement in subjective function. Eval:72  Last PN:  75    Current: 71 slight regression      Updated Goals:  1.  Pt will report no elbow pain with daily activities -such as washing hands, pulling on seat belt - to improve quality of life.   Last PN: pain with sleeping, intermittent with hand washing and seat belt    Current: still pain with sleeping, pt reporting less pain in right elbow with putting on seatbelt, less tender to palpation progressing        PLAN  []  Upgrade activities as tolerated     [x]  Continue plan of care  []  Update interventions per flow sheet       []  Discharge due to:_  []  Other:_      Rock Ashley Bean 9/24/2020  3:13 PM    Future Appointments   Date Time Provider [place] Tsai   9/24/2020  3:15 PM Rock Ashley [facility]   9/29/2020 10:15 AM Rock Ashley Bean [facility]   10/1/2020  3:15 PM Rock Ashley Bean [facility]   10/6/2020  3:30 PM Rock Ashley Bean [facility]   10/9/2020  9:30 AM Rock Ashley Bean [facility]PT[facility]

## 2020-09-29 ENCOUNTER — HOSPITAL ENCOUNTER (OUTPATIENT)
Dept: PHYSICAL THERAPY | Age: 50
Discharge: HOME | End: 2020-09-29
Payer: OTHER GOVERNMENT

## 2020-09-29 PROCEDURE — 97110 THERAPEUTIC EXERCISES: CPT

## 2020-09-29 PROCEDURE — 97112 NEUROMUSCULAR REEDUCATION: CPT

## 2020-09-29 PROCEDURE — 97140 MANUAL THERAPY 1/> REGIONS: CPT

## 2020-09-29 NOTE — PROGRESS NOTES
PT DAILY TREATMENT NOTE    Patient Name: Kristine Kat  Date:2020  : 1970  [x]  Patient  Verified  Payor:  / Plan: Lex Ordoñez 74 / Product Type:  /    In time:10:15  Out time:11:15  Total Treatment Time (min): 60  Total Timed Codes (min): 60  1:1 Treatment Time ( [address] only): 60   Visit #: 29 of 29    Treatment Area: Right shoulder pain [M25.511]  Pain in right arm [M79.601]    SUBJECTIVE  Pain Level (0-10 scale): 0  Any medication changes, allergies to medications, adverse drug reactions, diagnosis change, or new procedure performed?: [x] No    [] Yes (see summary sheet for update)  Subjective functional status/changes:   [] No changes reported  \"I still get some pain with sleeping at it wakes me up at night from sleeping on my stomach. \"    OBJECTIVE        32 min Therapeutic Exercise:  [x] See flow sheet :   Rationale: increase ROM and increase strength to improve the patients ability to perform daily activities with decreased pain and symptom levels     20 min Neuromuscular Re-education:  [x]  See flow sheet :   Rationale: increase ROM, increase strength, improve coordination and increase proprioception  to improve the patients ability to perform daily activities with decreased pain and symptom levels    8 min Manual Therapy:  Rib mobs with breaths    Rationale: increase ROM and increase tissue extensibility to improve the patients ability to perform daily activities with decreased pain and symptom levels    With   [] TE   [] TA   [] neuro   [] other: Patient Education: [x] Review HEP    [] Progressed/Changed HEP based on:   [] positioning   [] body mechanics   [] transfers   [] heat/ice application    [] other:      Other Objective/Functional Measures:   HGGIR: right 37 (post 48*)*, left 45* (post 55*)  Shoulder flexion: right 150*, 150* left (155* bilaterally post session)     Pain Level (0-10 scale) post treatment: 0    ASSESSMENT/Changes in Function: Pt is making good progress towards goals with ability to return to gym and complete pull ups in clinic without shoulder pain however continued right elbow pain with pull up. Right shoulder flexion AROM is now [facility] however still limited with AROM IR bilaterally with rib fare noted with movements. Pt reporting pain up to 1/10 with sleeping on stomach in right elbow and shoulder with still waking up every 2-3 hours from the pan. Pt would benefit from continued skilled PT services to address remaining unmet goals to allow pt to return to PLOF without pain . Patient will continue to benefit from skilled PT services to modify and progress therapeutic interventions, address functional mobility deficits, address ROM deficits, address strength deficits, analyze and address soft tissue restrictions, analyze and cue movement patterns, analyze and modify body mechanics/ergonomics, assess and modify postural abnormalities and instruct in home and community integration to attain remaining goals. []  See Plan of Care  []  See progress note/recertification  []  See Discharge Summary         Progress towards goals / Updated goals:  1.  Pt will demonstrate ability to complete pull ups without right shoulder pain to demonstrate improved scapular strength. Eval:pain with 1 pullup, increased thoracic extension and rib flare, challenged with PPT  Last PN: 9/1/20 regression pain in push-up position   Current: able to complete 5 pull ups in clinic without shoulder pain however c/o elbow pain progressing well      2.  Pt trunk rotation will improve to at least 15in to demonstrate improvef functional thoracic mobility  Eval: 17in right, 15.5in left   Last PN:regression 17 in bilaterally  Current: 16in left, 15.5 right progressing well      3.  Pt right shoulder flexion and IR AROM  will improve to [facility] allow pt to complete ADLs with increased ease.   Eval: 134* flexion ,25* IR  Last PN:  Regression 9/1/20 IR: 49* and flex 126*  Current: 155* shoulder flexion, IR 48* goal MET flexion, progressing IR     4.  Pt FOTO score will increase by >/ = 8 points to show improvement in subjective function. Eval:72  YQGR DR:  Renee    Current: 71 slight regression      Updated Goals:  1. Pt will report no elbow pain with daily activities -such as washing hands, pulling on seat belt - to improve quality of life.   Last PN: pain with sleeping, intermittent with hand washing and seat belt    Current: still pain with sleeping, pt reporting less pain in right elbow with putting on seatbelt, less tender to palpation progressing        PLAN  []  Upgrade activities as tolerated     [x]  Continue plan of care  []  Update interventions per flow sheet       []  Discharge due to:_  []  Other:_      Kana See Vikas 9/29/2020  10:15 AM    Future Appointments   Date Time Provider Tracy Tsai   10/1/2020  3:15 PM Kana Bean See [facility]   10/6/2020  3:30 PM Kana Bean See [facility]   10/9/2020  9:30 AM Kana Bean See [facility]

## 2020-09-29 NOTE — PROGRESS NOTES
[facility] at the [address], [place], [address] [facility]  Phone: [phone]      Fax:  [phone]    Progress Note  Patient name: Bon Flores Start of Care: 2020   Referral source: Monica Hicks MD : 1970               Medical Diagnosis: Right shoulder pain [M25.511]  Pain in right arm [M79.601]    Onset Date:3 weeks               Treatment Diagnosis: right shoulder pain   Prior Hospitalization: see medical history Provider#: [id number]   Medications: Verified on Patient summary List    Comorbidities: bilateral knee scope   Prior Level of Function: working out 5-6days a week        Visits from Start of Care: 29    Missed Visits: 0    Progress Towards Goals:   1.  Pt will demonstrate ability to complete pull ups without right shoulder pain to demonstrate improved scapular strength. Eval:pain with 1 pullup, increased thoracic extension and rib flare, challenged with PPT  Last PN: 20 regression pain in push-up position   Current: able to complete 5 pull ups in clinic without shoulder pain however c/o elbow pain progressing well      2.  Pt trunk rotation will improve to at least 15in to demonstrate improvef functional thoracic mobility  Eval: 17in right, 15.5in left   Last PN:regression 17 in bilaterally  Current: 16in left, 15.5 right progressing well      3.  Pt right shoulder flexion and IR AROM  will improve to [facility]ALO [facility] allow pt to complete ADLs with increased ease. Eval: 134* flexion ,25* IR  Last PN:  Regression 20 IR: 49* and flex 126*  Current: 155* shoulder flexion, IR 48* goal MET flexion, progressing IR     4.  Pt FOTO score will increase by >/ = 8 points to show improvement in subjective function. Eval:72  IGWW DU:  85    Current: 71 slight regression      Updated Goals:  1. Pt will report no elbow pain with daily activities -such as washing hands, pulling on seat belt - to improve quality of life.   Last PN: pain with sleeping, intermittent with hand washing and seat belt    Current: still pain with sleeping, pt reporting less pain in right elbow with putting on seatbelt, less tender to palpation progressing     Key Functional Changes: Pt is making good progress towards goals with ability to return to gym and complete pull ups in clinic without shoulder pain however continued right elbow pain with pull up. Right shoulder flexion AROM is now [facility] however still limited with AROM IR bilaterally with rib fare noted with movements. Pt reporting pain up to 1/10 with sleeping on stomach in right elbow and shoulder with still waking up every 2-3 hours from the pan. Pt would benefit from continued skilled PT services to address remaining unmet goals to allow pt to return to OF without pain . Updated Goals: to be achieved in 3 weeks:   See goals above    ASSESSMENT/RECOMMENDATIONS:  [x]Continue therapy per initial plan/protocol at a frequency of  2 x per week for 3 weeks  []Continue therapy with the following recommended changes:_____________________      _____________________________________________________________________  []Discontinue therapy progressing towards or have reached established goals  []Discontinue therapy due to lack of appreciable progress towards goals  []Discontinue therapy due to lack of attendance or compliance  []Await Physician's recommendations/decisions regarding therapy  []Other:________________________________________________________________    Thank you for this referral.   Aundrea STERLING [facility] 9/29/2020 2:22 PM  NOTE TO PHYSICIAN:  [address]   FAX TO [facility]: [phone]  If you are unable to process this request in 24 hours please contact our office: [phone]        []  I have read the above report and request that my patient continue as recommended.   []  I have read the above report and request that my patient continue therapy with the following changes/special instructions:________________________________________  []I have read the above report and request that my patient be discharged from therapy.     [de-identified] Signature:____________Date:_________TIME:________    [employer], Date and Time must be completed for valid certification **

## 2020-10-01 ENCOUNTER — HOSPITAL ENCOUNTER (OUTPATIENT)
Dept: PHYSICAL THERAPY | Age: 50
Discharge: HOME | End: 2020-10-01
Payer: OTHER GOVERNMENT

## 2020-10-01 PROCEDURE — 97110 THERAPEUTIC EXERCISES: CPT

## 2020-10-01 PROCEDURE — 97112 NEUROMUSCULAR REEDUCATION: CPT

## 2020-10-01 NOTE — PROGRESS NOTES
PT DAILY TREATMENT NOTE    Patient Name: Etta Martines  Date:10/1/2020  : 1970  [x]  Patient  Verified  Payor:  / Plan: Lex Ordoñez 74 / Product Type:  /    In time:3:11  Out time:4:10  Total Treatment Time (min): 59  Total Timed Codes (min): 59  1:1 Treatment Time ( only): 61   Visit #: 30 of 36    Treatment Area: Right shoulder pain [M25.511]  Pain in right arm [M79.601]    SUBJECTIVE  Pain Level (0-10 scale): 0  Any medication changes, allergies to medications, adverse drug reactions, diagnosis change, or new procedure performed?: [x] No    [] Yes (see summary sheet for update)  Subjective functional status/changes:   [] No changes reported  \"Still can't sleep because of the pain. \"    OBJECTIVE      34 min Therapeutic Exercise:  [x] See flow sheet :   Rationale: increase ROM and increase strength to improve the patients ability to perform daily activities with decreased pain and symptom levels     25 min Neuromuscular Re-education:  [x]  See flow sheet :   Rationale: improve coordination, improve balance and increase proprioception  to improve the patients ability to perform daily activities with decreased pain and symptom levels          With   [] TE   [] TA   [] neuro   [] other: Patient Education: [x] Review HEP    [] Progressed/Changed HEP based on:   [] positioning   [] body mechanics   [] transfers   [] heat/ice application    [] other:      Other Objective/Functional Measures:   Challenged with PPT with mountain climbers  Less peticia with cuppping to lats in s/l with active shoulder flexion     Pain Level (0-10 scale) post treatment: 0    ASSESSMENT/Changes in Function: good tolerance to exercises with improved form with QP however cues still needed to engage HS with 90/90. Tactile cues needed to decrease rib flare in s/l with shoulder flexion to improve lat mobility. Still very challenged with engaging core verus hip flexors with mountain climbers.      Patient will continue to benefit from skilled PT services to modify and progress therapeutic interventions, address functional mobility deficits, address ROM deficits, address strength deficits, analyze and address soft tissue restrictions, analyze and cue movement patterns, analyze and modify body mechanics/ergonomics, assess and modify postural abnormalities and instruct in home and community integration to attain remaining goals. []  See Plan of Care  []  See progress note/recertification  []  See Discharge Summary         Progress towards goals / Updated goals:  1.  Pt will demonstrate ability to complete pull ups without right shoulder pain to demonstrate improved scapular strength. Eval:pain with 1 pullup, increased thoracic extension and rib flare, challenged with PPT  Last PN:: able to complete 5 pull ups in clinic without shoulder pain however c/o elbow pain  Current:      2.  Pt trunk rotation will improve to at least 15in to demonstrate improvef functional thoracic mobility  Eval: 17in right, 15.5in left   Last PN: 16in left, 15.5 right   Current:      3.  Pt right shoulder flexion and IR AROM  will improve to [facility] allow pt to complete ADLs with increased ease. Eval: 134* flexion ,25* IR  Last PN:  155* shoulder flexion, IR 48*   Current:      4.  Pt FOTO score will increase by >/ = 8 points to show improvement in subjective function. Eval:72  Last PN:  71    Current:     Updated Goals:  1. Pt will report no elbow pain with daily activities -such as washing hands, pulling on seat belt - to improve quality of life.   Last PN: still pain with sleeping, pt reporting less pain in right elbow with putting on seatbelt, less tender to palpation  Current: no change, still pain with sleeping with waking up on stomach     PLAN  []  Upgrade activities as tolerated     [x]  Continue plan of care  []  Update interventions per flow sheet       []  Discharge due to:_  []  Other:_      Lucio Barnes 10/1/2020  3:13 PM    Future Appointments   Date Time Provider Tracy Quin   10/1/2020  3:15 PM Diann Bean [facility]   10/6/2020  3:30 PM Diann Bean [facility]   10/9/2020  9:30 AM Diann Bean [facility]

## 2020-10-06 ENCOUNTER — HOSPITAL ENCOUNTER (OUTPATIENT)
Dept: PHYSICAL THERAPY | Age: 50
Discharge: HOME | End: 2020-10-06
Payer: OTHER GOVERNMENT

## 2020-10-06 PROCEDURE — 97110 THERAPEUTIC EXERCISES: CPT

## 2020-10-06 PROCEDURE — 97530 THERAPEUTIC ACTIVITIES: CPT

## 2020-10-06 NOTE — PROGRESS NOTES
PT DAILY TREATMENT NOTE    Patient Name: Brent Amador  Date:10/6/2020  : 1970  [x]  Patient  Verified  Payor:  / Plan: Lex Ordoñez 74 / Product Type:  /    In time:3:15  Out time:4:23  Total Treatment Time (min): 68  Total Timed Codes (min): 68  1:1 Treatment Time ( [address] only): 68   Visit #: 31 of 36    Treatment Area: Right shoulder pain [M25.511]  Pain in right arm [M79.601]    SUBJECTIVE  Pain Level (0-10 scale): 0  Any medication changes, allergies to medications, adverse drug reactions, diagnosis change, or new procedure performed?: [x] No    [] Yes (see summary sheet for update)  Subjective functional status/changes:   [] No changes reported  \"good. I changed up how I am sleeping and that is helping. \"    OBJECTIVE    48 min Therapeutic Exercise:  [x] See flow sheet :   Rationale: increase ROM and increase strength to improve the patients ability to perform daily activities with decreased pain and symptom levels    20 min Therapeutic Activity:  [x]  See flow sheet :   Rationale: improve coordination, improve balance and increase proprioception  to improve the patients ability to perform daily activities with decreased pain and symptom levels     With   [] TE   [] TA   [] neuro   [] other: Patient Education: [x] Review HEP    [] Progressed/Changed HEP based on:   [] positioning   [] body mechanics   [] transfers   [] heat/ice application    [] other:      Other Objective/Functional Measures:   Challenged with lat hang    50* right shoulder HGGIR    Pain Level (0-10 scale) post treatment: 0    ASSESSMENT/Changes in Function: good tolerance to exercises with no pain only fatigue however very challenged with recruiting abdominals with Goldy diganols today. Unable to feel stretch in lats with hang however able to standing.  Pt reporting pain with picking up trash over the weekend in elbow however able to complete in clinic today without pain with wrist in neutral position Patient will continue to benefit from skilled PT services to modify and progress therapeutic interventions, address functional mobility deficits, address ROM deficits, address strength deficits, analyze and address soft tissue restrictions, analyze and cue movement patterns, analyze and modify body mechanics/ergonomics, assess and modify postural abnormalities and instruct in home and community integration to attain remaining goals. []  See Plan of Care  []  See progress note/recertification  []  See Discharge Summary         Progress towards goals / Updated goals:  1.  Pt will demonstrate ability to complete pull ups without right shoulder pain to demonstrate improved scapular strength. Eval:pain with 1 pullup, increased thoracic extension and rib flare, challenged with PPT  Last PN:: able to complete 5 pull ups in clinic without shoulder pain however c/o elbow pain  Current:      2.  Pt trunk rotation will improve to at least 15in to demonstrate improvef functional thoracic mobility  Eval: 17in right, 15.5in left   Last PN: 16in left, 15.5 right   Current:      3.  Pt right shoulder flexion and IR AROM  will improve to [facility] allow pt to complete ADLs with increased ease. Eval: 134* flexion ,25* IR  Last PN:  155* shoulder flexion, IR 48*   Current: 50* IR 10/6/2020     4.  Pt FOTO score will increase by >/ = 8 points to show improvement in subjective function. Eval:72  Last PN:  71    Current:     Updated Goals:  1. Pt will report no elbow pain with daily activities -such as washing hands, pulling on seat belt - to improve quality of life.   Last PN: still pain with sleeping, pt reporting less pain in right elbow with putting on seatbelt, less tender to palpation  Current: no change, still pain with sleeping with waking up on stomach        PLAN  []  Upgrade activities as tolerated     [x]  Continue plan of care  []  Update interventions per flow sheet       []  Discharge due to:_  []  Other:_ Cy Pears Remesic 10/6/2020  3:34 PM    Future Appointments   Date Time Provider Tracy Quin   10/9/2020  9:30 AM Remesic, Cy Pears [facility]   10/13/2020  3:30 PM Remesic, Cy Pears [facility]   10/20/2020  3:30 PM Remesic, Cy Pears [facility]   10/26/2020  2:45 PM Remesic, Cy Pears [facility]   10/28/2020  1:00 PM Leanor Lafayette, PT, DPT [facility]

## 2020-10-09 ENCOUNTER — APPOINTMENT (OUTPATIENT)
Dept: PHYSICAL THERAPY | Age: 50
End: 2020-10-09
Payer: OTHER GOVERNMENT

## 2020-10-13 ENCOUNTER — HOSPITAL ENCOUNTER (OUTPATIENT)
Dept: PHYSICAL THERAPY | Age: 50
Discharge: HOME | End: 2020-10-13
Payer: OTHER GOVERNMENT

## 2020-10-13 PROCEDURE — 97140 MANUAL THERAPY 1/> REGIONS: CPT

## 2020-10-13 PROCEDURE — 97530 THERAPEUTIC ACTIVITIES: CPT

## 2020-10-13 PROCEDURE — 97110 THERAPEUTIC EXERCISES: CPT

## 2020-10-13 NOTE — PROGRESS NOTES
PT DAILY TREATMENT NOTE    Patient Name: Mathew Dill  Date:10/13/2020  : 1970  [x]  Patient  Verified  Payor:  / Plan: Lex Ordoñez 74 / Product Type:  /    In time:3:20  Out time:4:14  Total Treatment Time (min): 54  Total Timed Codes (min): 54  1:1 Treatment Time ( only): 47   Visit #: 32 of 36    Treatment Area: Right shoulder pain [M25.511]  Pain in right arm [M79.601]    SUBJECTIVE  Pain Level (0-10 scale): 0  Any medication changes, allergies to medications, adverse drug reactions, diagnosis change, or new procedure performed?: [x] No    [] Yes (see summary sheet for update)  Subjective functional status/changes:   [] No changes reported  \"Overall doing much better since I am not in constant pain. \"    OBJECTIVE      31 min Therapeutic Exercise:  [x] See flow sheet :   Rationale: increase ROM and increase strength to improve the patients ability to perform daily activities with decreased pain and symptom levels    15 min Therapeutic Activity:  [x]  See flow sheet :   Rationale: increase strength, improve coordination and increase proprioception  to improve the patients ability to perform daily activities with decreased pain and symptom levels       8 min Manual Therapy:  Rib mobs with 90/90, cupping to lats in lady in glasses  And low back in prayer stretch    Rationale: decrease pain, increase ROM and increase tissue extensibility to improve the patients ability to perform daily activities with decreased pain and symptom levels      With   [] TE   [] TA   [] neuro   [] other: Patient Education: [x] Review HEP    [] Progressed/Changed HEP based on:   [] positioning   [] body mechanics   [] transfers   [] heat/ice application    [] other:      Other Objective/Functional Measures:   HGGIR 50* right, flexion shoulder 160*  Decreased blood flow noted in left lower lumbar paraspinal      Pain Level (0-10 scale) post treatment: 0    ASSESSMENT/Changes in Function: good tolerance to activities with pt reporting less pain with sleeping now. Continues to demonstrate decreased lumbar and lat mobility especially with prayer stretch today. Very challenged maintaining PPT with plank cross connect. Patient will continue to benefit from skilled PT services to modify and progress therapeutic interventions, address functional mobility deficits, address ROM deficits, address strength deficits, analyze and address soft tissue restrictions, analyze and cue movement patterns, analyze and modify body mechanics/ergonomics, assess and modify postural abnormalities and instruct in home and community integration to attain remaining goals. []  See Plan of Care  []  See progress note/recertification  []  See Discharge Summary         Progress towards goals / Updated goals:  1.  Pt will demonstrate ability to complete pull ups without right shoulder pain to demonstrate improved scapular strength. Eval:pain with 1 pullup, increased thoracic extension and rib flare, challenged with PPT  Last PN:: able to complete 5 pull ups in clinic without shoulder pain however c/o elbow pain  Current:      2.  Pt trunk rotation will improve to at least 15in to demonstrate improvef functional thoracic mobility  Eval: 17in right, 15.5in left   Last PN: 16in left, 15.5 right   Current:      3.  Pt right shoulder flexion and IR AROM  will improve to [facility] allow pt to complete ADLs with increased ease. Eval: 134* flexion ,25* IR  Last PN:  155* shoulder flexion, IR 48*   Current: 50* IR, 160* shoulder flexion  10/13/2020     4.  Pt FOTO score will increase by >/ = 8 points to show improvement in subjective function. Eval:72  Last PN:  71    Current:     Updated Goals:  1. Pt will report no elbow pain with daily activities -such as washing hands, pulling on seat belt - to improve quality of life.   Last PN: still pain with sleeping, pt reporting less pain in right elbow with putting on seatbelt, less tender to palpation  Current: less pain with sleeping and putting on seatbelt 10/13/2020          PLAN  []  Upgrade activities as tolerated     [x]  Continue plan of care  []  Update interventions per flow sheet       []  Discharge due to:_  []  Other:_      Doneta See Remdaryn 10/13/2020  3:27 PM    Future Appointments   Date Time Provider Tracy Quin   10/13/2020  3:30 PM [facility]   10/20/2020  3:30 PM Kana Bean See [facility]   10/26/2020  2:45 PM [facility]   10/28/2020  1:00 PM Aman Peck, PT, DPT [facility]

## 2020-10-20 ENCOUNTER — HOSPITAL ENCOUNTER (OUTPATIENT)
Dept: PHYSICAL THERAPY | Age: 50
Discharge: HOME | End: 2020-10-20
Payer: OTHER GOVERNMENT

## 2020-10-20 PROCEDURE — 97140 MANUAL THERAPY 1/> REGIONS: CPT

## 2020-10-20 PROCEDURE — 97110 THERAPEUTIC EXERCISES: CPT

## 2020-10-20 PROCEDURE — 97530 THERAPEUTIC ACTIVITIES: CPT

## 2020-10-20 NOTE — PROGRESS NOTES
PT DAILY TREATMENT NOTE    Patient Name: Annette Casey  Date:10/20/2020  : 1970  [x]  Patient  Verified  Payor: ANTONY / Plan: Lex Ordoñez 74 / Product Type: Brendia Span /    In time:3:20  Out time:4:20  Total Treatment Time (min): 60  Total Timed Codes (min): 60  1:1 Treatment Time ( only): 60   Visit #: 33 of 36    Treatment Area: Right shoulder pain [M25.511]  Pain in right arm [M79.601]    SUBJECTIVE  Pain Level (0-10 scale): 0  Any medication changes, allergies to medications, adverse drug reactions, diagnosis change, or new procedure performed?: [x] No    [] Yes (see summary sheet for update)  Subjective functional status/changes:   [] No changes reported  \"My back is hurting since Saturday and I don't know why. \"    OBJECTIVE      30 min Therapeutic Exercise:  [x] See flow sheet :   Rationale: increase ROM and increase strength to improve the patients ability to perform daily activities with decreased pain and symptom levels     20 min Therapeutic Activity:  [x]  See flow sheet :   Rationale: improve coordination, improve balance and increase proprioception  to improve the patients ability to perform daily activities with decreased pain and symptom levels       10 min Manual Therapy:  Rib pumping, cups to lats in lady in glasses, manual lat stretch in s/l    Rationale: decrease pain, increase ROM and increase tissue extensibility to improve the patients ability to perform daily activities with decreased pain and symptom levels          With   [] TE   [] TA   [] neuro   [] other: Patient Education: [x] Review HEP    [] Progressed/Changed HEP based on:   [] positioning   [] body mechanics   [] transfers   [] heat/ice application    [] other:      Other Objective/Functional Measures:   HGGIR right pre/post 38*/55*     Pain Level (0-10 scale) post treatment: 0    ASSESSMENT/Changes in Function: Pt continues to demonstrate decreased core strength with challenged with facilitating obliques with keister push/pull today. Improved lumbar mobility noted with QP and prayer stretch today. Pt reporting increased tolerance to sleeping on side at night with waking less due to pain. Patient will continue to benefit from skilled PT services to modify and progress therapeutic interventions, address functional mobility deficits, address ROM deficits, address strength deficits, analyze and address soft tissue restrictions, analyze and cue movement patterns, analyze and modify body mechanics/ergonomics, assess and modify postural abnormalities and instruct in home and community integration to attain remaining goals. []  See Plan of Care  []  See progress note/recertification  []  See Discharge Summary         Progress towards goals / Updated goals:  1.  Pt will demonstrate ability to complete pull ups without right shoulder pain to demonstrate improved scapular strength. Eval:pain with 1 pullup, increased thoracic extension and rib flare, challenged with PPT  Last PN:: able to complete 5 pull ups in clinic without shoulder pain however c/o elbow pain  Current:      2.  Pt trunk rotation will improve to at least 15in to demonstrate improvef functional thoracic mobility  Eval: 17in right, 15.5in left   Last PN: 16in left, 15.5 right   Current:      3.  Pt right shoulder flexion and IR AROM  will improve to [facility] allow pt to complete ADLs with increased ease. Eval: 134* flexion ,25* IR  Last PN:  155* shoulder flexion, IR 48*   Current: 55* HGGIR, 155* flexion 10/20     4.  Pt FOTO score will increase by >/ = 8 points to show improvement in subjective function. Eval:72  Last PN:  71    Current:     Updated Goals:  1. Pt will report no elbow pain with daily activities -such as washing hands, pulling on seat belt - to improve quality of life.   Last PN: still pain with sleeping, pt reporting less pain in right elbow with putting on seatbelt, less tender to palpation  Current: able to sleep on side longer now with waking up less due to shoulder pan 10/20       PLAN  []  Upgrade activities as tolerated     [x]  Continue plan of care  []  Update interventions per flow sheet       []  Discharge due to:_  []  Other:_      Merlin Slipper Remesic 10/20/2020  3:43 PM    Future Appointments   Date Time Provider Tracy Tsai   10/26/2020  2:45 PM Remesic, Merlin Slipper [facility]   10/28/2020  1:00 PM Skyla Gee, PT, DPT [facility]

## 2020-10-26 ENCOUNTER — HOSPITAL ENCOUNTER (OUTPATIENT)
Dept: PHYSICAL THERAPY | Age: 50
Discharge: HOME | End: 2020-10-26
Payer: OTHER GOVERNMENT

## 2020-10-26 PROCEDURE — 97140 MANUAL THERAPY 1/> REGIONS: CPT

## 2020-10-26 PROCEDURE — 97110 THERAPEUTIC EXERCISES: CPT

## 2020-10-26 PROCEDURE — 97530 THERAPEUTIC ACTIVITIES: CPT

## 2020-10-26 NOTE — PROGRESS NOTES
PT DAILY TREATMENT NOTE    Patient Name: Werner Peralta  Date:10/26/2020  : 1970  [x]  Patient  Verified  Payor:  / Plan: Lxe Ordoñez 74 / Product Type:  /    In time:3:35  Out time:4:45  Total Treatment Time (min): 70  Total Timed Codes (min): 60  1:1 Treatment Time ( [address] only): 60  Visit #: 34 of 36    Treatment Area: Right shoulder pain [M25.511]  Pain in right arm [M79.601]    SUBJECTIVE  Pain Level (0-10 scale): 0  Any medication changes, allergies to medications, adverse drug reactions, diagnosis change, or new procedure performed?: [x] No    [] Yes (see summary sheet for update)  Subjective functional status/changes:   [] No changes reported  \"My knee is still hurting so I see Dr. Ilene Zapataing tomorrow for it. Back is much better and my shoulder and elbow are doing good. \"    OBJECTIVE        40 min Therapeutic Exercise:  [x] See flow sheet :   Rationale: increase ROM and increase strength to improve the patients ability to perform daily activities with decreased pain and symptom levels    20 min Therapeutic Activity:  [x]  See flow sheet :   Rationale: increase strength, improve coordination and increase proprioception  to improve the patients ability to perform daily activities with decreased pain and symptom levels       10 min Manual Therapy:  STM to lats, subscap in supine, manual pec stretch, cupping to lats in s/l with active shoulder flexion    Rationale: decrease pain, increase ROM and increase tissue extensibility to improve the patients ability to perform daily activities with decreased pain and symptom levels      With   [] TE   [] TA   [] neuro   [] other: Patient Education: [x] Review HEP    [] Progressed/Changed HEP based on:   [] positioning   [] body mechanics   [] transfers   [] heat/ice application    [] other:      Other Objective/Functional Measures:   Right shoulder flexion 160*, HGGIR 68*  Challenged with feeling abdominals     Pain Level (0-10 scale) post treatment: 0    ASSESSMENT/Changes in Function: Pt reporting right elbow pain has decreased significantly with watching positioning. Pt continues to demonstrate decreased thoraicc mobility with only able to get to 90* with wall angels today. Right shoulder ROM is improving after completeing exercises. Consistent cues still needed to recruit abdominals. Patient will continue to benefit from skilled PT services to modify and progress therapeutic interventions, address functional mobility deficits, address ROM deficits, address strength deficits, analyze and address soft tissue restrictions, analyze and cue movement patterns, analyze and modify body mechanics/ergonomics, assess and modify postural abnormalities and instruct in home and community integration to attain remaining goals. []  See Plan of Care  []  See progress note/recertification  []  See Discharge Summary         Progress towards goals / Updated goals:  1.  Pt will demonstrate ability to complete pull ups without right shoulder pain to demonstrate improved scapular strength. Eval:pain with 1 pullup, increased thoracic extension and rib flare, challenged with PPT  Last PN:: able to complete 5 pull ups in clinic without shoulder pain however c/o elbow pain  Current:      2.  Pt trunk rotation will improve to at least 15in to demonstrate improvef functional thoracic mobility  Eval: 17in right, 15.5in left   Last PN: 16in left, 15.5 right   Current:      3.  Pt right shoulder flexion and IR AROM  will improve to [facility] allow pt to complete ADLs with increased ease. Eval: 134* flexion ,25* IR  Last PN:  155* shoulder flexion, IR 48*   Current: 68* HGGIR, 160* shoulder flexion progressing well      4.  Pt FOTO score will increase by >/ = 8 points to show improvement in subjective function. Eval:72  Last PN:  71    Current:     Updated Goals:  1.  Pt will report no elbow pain with daily activities -such as washing hands, pulling on seat belt - to improve quality of life.   Last PN: still pain with sleeping, pt reporting less pain in right elbow with putting on seatbelt, less tender to palpation  Current: able to sleep on side longer now with waking up less due to shoulder pain, no longer having right elbow pain with proper positioning of wrist with ADLs 10/20        PLAN  []  Upgrade activities as tolerated     [x]  Continue plan of care  []  Update interventions per flow sheet       []  Discharge due to:_  []  Other:_      Rodriguez Bean 10/26/2020  3:58 PM    Future Appointments   Date Time Provider Tracy Tsai   10/28/2020  1:00 PM Lester Ho PT, DPT [facility]   11/2/2020  2:45 PM Rodriguez Bean [facility]   11/5/2020  3:15 PM Lester Ho PT, DPT [facility]   11/10/2020  3:30 PM Rodriguez Bean [facility]   11/12/2020  3:15 PM Lester Ho PT, DPT [facility]   11/16/2020  3:15 PM Lester Ho PT, DPT [facility]   11/19/2020  3:15 PM Rodriguez Bean [facility]

## 2020-10-28 ENCOUNTER — HOSPITAL ENCOUNTER (OUTPATIENT)
Dept: PHYSICAL THERAPY | Age: 50
Discharge: HOME | End: 2020-10-28
Payer: OTHER GOVERNMENT

## 2020-10-28 PROCEDURE — 97110 THERAPEUTIC EXERCISES: CPT

## 2020-10-28 PROCEDURE — 97112 NEUROMUSCULAR REEDUCATION: CPT

## 2020-10-28 PROCEDURE — 97530 THERAPEUTIC ACTIVITIES: CPT

## 2020-10-28 NOTE — PROGRESS NOTES
PT DAILY TREATMENT NOTE    Patient Name: Nieves Baptiste  Date:10/28/2020  : 1970  [x]  Patient  Verified  Payor:  / Plan: Lex Ordoñez 74 / Product Type:  /    In time:12:45 pm  Out time:1:45 pm   Total Treatment Time (min): 60  Total Timed Codes (min): 60  Visit #: 35 of 36    Treatment Area: Right shoulder pain [M25.511]  Pain in right arm [M79.601]    SUBJECTIVE  Pain Level (0-10 scale): 0  Any medication changes, allergies to medications, adverse drug reactions, diagnosis change, or new procedure performed?: [x] No    [] Yes (see summary sheet for update)  Subjective functional status/changes:   [] No changes reported  \"I need to schedule my MRI for my knee. \"  No shoulder or elbow pain    OBJECTIVE      20 min Therapeutic Exercise:  [x] See flow sheet :   Rationale: increase ROM, increase strength, improve coordination and increase proprioception to improve the patients ability to perform daily activities with decreased pain and symptom levels      15 min Therapeutic Activity:  [x]  See flow sheet :   Rationale: increase ROM, increase strength, improve coordination and increase proprioception  to improve the patients ability to perform daily activities with decreased pain and symptom levels       25 min Neuromuscular Re-education:  [x]  See flow sheet : utilized vacuum cupping at lumbar paraspinals with prayer stretch  Manual over pressure at left ribs with 90-90 right reach to facilitate left abs   Rationale: increase ROM, increase strength, improve coordination and increase proprioception  to improve the patients ability to perform daily activities with decreased pain and symptom levels            With   [] TE   [] TA   [] neuro   [] other: Patient Education: [x] Review HEP    [] Progressed/Changed HEP based on:   [] positioning   [] body mechanics   [] transfers   [] heat/ice application    [] other:      Other Objective/Functional Measures:   LORA Special Tests (pre/post)  HGIR:                                                 Right: 60/73             Left: 78/85  Hip Add Drop Test:                           Right: midline/-         Left:+/midline  Trunk Rot                                           Right: 16 in/15 in  Left 16 in  /15 in  Shoulder Horizontal Abduction          Right: 38 /42             Left: 24 /35       Pain Level (0-10 scale) post treatment: 0    ASSESSMENT/Changes in Function:   Pt is progressing nicely with PT. Has been able to return to gym based program and can complete push-ups and 1 pull up without pain. Have been focusing treatment on positioning, facilitation of stabilizer muscles and correct mechanics. Have incorporated vacuum cupping to address soft tissue restrictions as well as other manual techniques. Pt reports increased tolerance to sleeping with position change. Continues to be limited in IR ROM and scapular/ GHJ mechanics. Plan to continue x6 visits and taper to 1x per week as transitions to independent program.     Patient will continue to benefit from skilled PT services to modify and progress therapeutic interventions, address functional mobility deficits, address ROM deficits, address strength deficits, analyze and address soft tissue restrictions, analyze and cue movement patterns, analyze and modify body mechanics/ergonomics, assess and modify postural abnormalities and instruct in home and community integration to attain remaining goals. []  See Plan of Care  []  See progress note/recertification  []  See Discharge Summary         Progress towards goals / Updated goals:  1.  Pt will demonstrate ability to complete pull ups without right shoulder pain to demonstrate improved scapular strength.   Eval:pain with 1 pullup, increased thoracic extension and rib flare, challenged with PPT  Last PN: able to complete 5 pull ups in clinic without shoulder pain however c/o elbow pain  Current: Progressing 10/28/20 1 pull up without elbow or shoulder pain      2.  Pt trunk rotation will improve to at least 15in to demonstrate improvef functional thoracic mobility  Eval: 17in right, 15.5in left   Last PN: 16in left, 15.5 right   Current: Progressing 10/28/20 15 in bilaterall at end of session      3.  Pt right shoulder flexion and IR AROM  will improve to CHALO [facility] allow pt to complete ADLs with increased ease. Eval: 134* flexion ,25* IR  Last PN:  155* shoulder flexion, IR 48*   Current: 73* HGGIR, 160* shoulder flexion progressing well 10/28/20      4.  Pt FOTO score will increase by >/ = 8 points to show improvement in subjective function. Eval:72  Last PN:  71    Current:     Updated Goals:  1. Pt will report no elbow pain with daily activities -such as washing hands, pulling on seat belt - to improve quality of life.   Last PN: still pain with sleeping, pt reporting less pain in right elbow with putting on seatbelt, less tender to palpation  Current: Progressing 10/28/20 - no pain with ADLs as long as focusses on wrist position, pain if sleeps prone    PLAN  [x]  Upgrade activities as tolerated     []  Continue plan of care  []  Update interventions per flow sheet       []  Discharge due to:_  []  Other:_      Reyna Esparza PT, DPT 10/28/2020  12:58 PM    Future Appointments   Date Time Provider [place] Tsai   10/28/2020  1:00 PM Hank Martinez PT, DPT [facility]   11/2/2020  2:45 PM Shahana Bean [facility]   11/4/2020  9:30 AM Hank Martinez PT, DPT [facility]   11/10/2020  3:30 PM Shahana Bean [facility]   11/12/2020 11:45 AM Hank Martinez PT, DPT [facility]   11/16/2020  3:15 PM Hank Martinez PT, DPT [facility]   11/18/2020  8:45 AM Shahana [facility]HPTBW [facility]

## 2020-10-28 NOTE — PROGRESS NOTES
[facility] at the [address], [place], [address] [facility]  Phone: [phone]      Fax:  [phone]    Progress Note  Patient name: Bon Flores Start of Care: 2020   Referral source: Hailey Hicks MD : 1970               Medical Diagnosis: Right shoulder pain [M25.511]  Pain in right arm [M79.601]    Onset Date:3 weeks               Treatment Diagnosis: right shoulder pain   Prior Hospitalization: see medical history Provider#: [id number]   Medications: Verified on Patient summary List    Comorbidities: bilateral knee scope   Prior Level of Function: working out 5-6days a week        Visits from Start of Care: 35    Missed Visits: 0    Progress Towards Goals:   1.  Pt will demonstrate ability to complete pull ups without right shoulder pain to demonstrate improved scapular strength. Eval:pain with 1 pullup, increased thoracic extension and rib flare, challenged with PPT  Last PN: able to complete 5 pull ups in clinic without shoulder pain however c/o elbow pain  Current: Progressing 10/28/20 1 pull up without elbow or shoulder pain      2.  Pt trunk rotation will improve to at least 15in to demonstrate improvef functional thoracic mobility  Eval: 17in right, 15.5in left   Last PN: 16in left, 15.5 right   Current: Progressing 10/28/20 15 in bilaterall at end of session      3.  Pt right shoulder flexion and IR AROM  will improve to CHALO [facility] allow pt to complete ADLs with increased ease. Eval: 134* flexion ,25* IR  Last PN:  155* shoulder flexion, IR 48*   Current: 73* HGGIR, 160* shoulder flexion progressing well 10/28/20      4.  Pt FOTO score will increase by >/ = 8 points to show improvement in subjective function. Eval:72  Last PN:  71    Current:     Updated Goals:  1. Pt will report no elbow pain with daily activities -such as washing hands, pulling on seat belt - to improve quality of life.   Last PN: still pain with sleeping, pt reporting less pain in right elbow with putting on seatbelt, less tender to palpation  Current: Progressing 10/28/20 - no pain with ADLs as long as focusses on wrist position, pain if sleeps prone    Key Functional Changes:   Pt is progressing nicely with PT. Has been able to return to gym based program and can complete push-ups and 1 pull up without pain. Have been focusing treatment on positioning, facilitation of stabilizer muscles and correct mechanics. Have incorporated vacuum cupping to address soft tissue restrictions as well as other manual techniques. Pt reports increased tolerance to sleeping with position change. Continues to be limited in IR ROM and scapular/ GHJ mechanics. Plan to continue x6 visits and taper to 1x per week as transitions to independent program.     Updated Goals: to be achieved in 6 treatments:   Same as unmet above    ASSESSMENT/RECOMMENDATIONS:  [x]Continue therapy per initial plan/protocol at a frequency of  6 treatments  []Continue therapy with the following recommended changes:_____________________      _____________________________________________________________________  []Discontinue therapy progressing towards or have reached established goals  []Discontinue therapy due to lack of appreciable progress towards goals  []Discontinue therapy due to lack of attendance or compliance  []Await Physician's recommendations/decisions regarding therapy  []Other:________________________________________________________________    Thank you for this referral.   Elly Sparks, PT, DPT 10/28/2020 3:50 PM  NOTE TO PHYSICIAN:  [address] TO [facility]: [phone]  If you are unable to process this request in 24 hours please contact our office: [phone]        []  I have read the above report and request that my patient continue as recommended.   []  I have read the above report and request that my patient continue therapy with the following changes/special instructions:________________________________________  []I have read the above report and request that my patient be discharged from therapy.     [de-identified] Signature:____________Date:_________TIME:________    [employer], Date and Time must be completed for valid certification **

## 2020-11-02 ENCOUNTER — HOSPITAL ENCOUNTER (OUTPATIENT)
Dept: PHYSICAL THERAPY | Age: 50
Discharge: HOME | End: 2020-11-02
Payer: OTHER GOVERNMENT

## 2020-11-02 PROCEDURE — 97112 NEUROMUSCULAR REEDUCATION: CPT

## 2020-11-02 PROCEDURE — 97110 THERAPEUTIC EXERCISES: CPT

## 2020-11-02 PROCEDURE — 97530 THERAPEUTIC ACTIVITIES: CPT

## 2020-11-02 NOTE — PROGRESS NOTES
PT DAILY TREATMENT NOTE    Patient Name: Werner Peralta  Date:2020  : 1970  [x]  Patient  Verified  Payor:  / Plan: Lex Ordoñez 74 / Product Type:  /    In time:2:45  Out time:3:35  Total Treatment Time (min): 50  Total Timed Codes (min): 50  1:1 Treatment Time ( [address] only): 50   Visit #: 36 of 41    Treatment Area: Right shoulder pain [M25.511]  Pain in right arm [M79.601]    SUBJECTIVE  Pain Level (0-10 scale): 0  Any medication changes, allergies to medications, adverse drug reactions, diagnosis change, or new procedure performed?: [x] No    [] Yes (see summary sheet for update)  Subjective functional status/changes:   [] No changes reported  \"good, My elbow is only tender when I push on it. \"    OBJECTIVE    20 min Therapeutic Exercise:  [x] See flow sheet :   Rationale: increase ROM and increase strength to improve the patients ability to perform daily activities with decreased pain and symptom levels    15 min Therapeutic Activity:  [x]  See flow sheet :   Rationale: increase strength, improve coordination and increase proprioception  to improve the patients ability to perform daily activities with decreased pain and symptom levels     15 min Neuromuscular Re-education:  [x]  See flow sheet :   Rationale: improve coordination and increase proprioception  to improve the patients ability to perform daily activities with decreased pain and symptom levels          With   [] TE   [] TA   [] neuro   [] other: Patient Education: [x] Review HEP    [] Progressed/Changed HEP based on:   [] positioning   [] body mechanics   [] transfers   [] heat/ice application    [] other:      Other Objective/Functional Measures:   Right shoulder HGGIR 70*,  flexion 160* post session   Cues to slow down with exercises    Pain Level (0-10 scale) post treatment: 0    ASSESSMENT/Changes in Function: Pt tolerated session with no pain only fatigued.  Consistent cues still needed for proper form with exercises. Decreased trunk rotation noted in QP prayer and decreased height with prayer Y's. Patient will continue to benefit from skilled PT services to modify and progress therapeutic interventions, address functional mobility deficits, address ROM deficits, address strength deficits, analyze and address soft tissue restrictions, analyze and cue movement patterns, analyze and modify body mechanics/ergonomics, assess and modify postural abnormalities and instruct in home and community integration to attain remaining goals. []  See Plan of Care  []  See progress note/recertification  []  See Discharge Summary         Progress towards goals / Updated goals:  1.  Pt will demonstrate ability to complete pull ups without right shoulder pain to demonstrate improved scapular strength. Eval:pain with 1 pullup, increased thoracic extension and rib flare, challenged with PPT  Last PN: 1 pull up without elbow or shoulder pain   Current:      2.  Pt trunk rotation will improve to at least 15in to demonstrate improvef functional thoracic mobility  Eval: 17in right, 15.5in left   Last PN:t15 in bilaterall at end of session  Current:      3.  Pt right shoulder flexion and IR AROM  will improve to CHALO [facility] allow pt to complete ADLs with increased ease. Eval: 134* flexion ,25* IR  Last PN: 73* HGGIR, 160* shoulder flexion   Current: 70* HGGIR, 160* shoulder flexion 11/2     4.  Pt FOTO score will increase by >/ = 8 points to show improvement in subjective function. Eval:72  Last PN:  71    Current:     Updated Goals:  1. Pt will report no elbow pain with daily activities -such as washing hands, pulling on seat belt - to improve quality of life.   Last PN: no pain with ADLs as long as focusses on wrist position, pain if sleeps prone  Current:     PLAN  []  Upgrade activities as tolerated     [x]  Continue plan of care  []  Update interventions per flow sheet       []  Discharge due to:_  []  Other:_      Katelin Orellana Remesic 11/2/2020  2:43 PM    Future Appointments   Date Time Provider [place]   11/2/2020  2:45 PM Toby Bean [facility]   11/4/2020  9:30 AM Esteban Vazquez PT, DPT [facility]   11/10/2020  3:30 PM Toby Bean [facility]   11/12/2020 11:45 AM Esteban Vazquez PT, DPT [facility]   11/16/2020  3:15 PM Esteban Vazquez PT, DPT [facility]   11/18/2020  8:45 AM Toby Bean [facility]

## 2020-11-04 ENCOUNTER — HOSPITAL ENCOUNTER (OUTPATIENT)
Dept: PHYSICAL THERAPY | Age: 50
Discharge: HOME | End: 2020-11-04
Payer: OTHER GOVERNMENT

## 2020-11-04 PROCEDURE — 97110 THERAPEUTIC EXERCISES: CPT

## 2020-11-04 PROCEDURE — 97140 MANUAL THERAPY 1/> REGIONS: CPT

## 2020-11-04 PROCEDURE — 97530 THERAPEUTIC ACTIVITIES: CPT

## 2020-11-04 PROCEDURE — 97112 NEUROMUSCULAR REEDUCATION: CPT

## 2020-11-04 NOTE — PROGRESS NOTES
PT DAILY TREATMENT NOTE    Patient Name: Nola Quinonez  Date:2020  : 1970  [x]  Patient  Verified  Payor:  / Plan: [facility]  [facility] / Product Type:  /    In time:9:30 am  Out time:10:25 am  Total Treatment Time (min): 55  Total Timed Codes (min): 55  Visit #: 40 of 41    Treatment Area: Right shoulder pain [M25.511]  Pain in right arm [M79.601]    SUBJECTIVE  Pain Level (0-10 scale): 0  Any medication changes, allergies to medications, adverse drug reactions, diagnosis change, or new procedure performed?: [x] No    [] Yes (see summary sheet for update)  Subjective functional status/changes:   [] No changes reported  \"Pretty good. \"      OBJECTIVE      10 min Therapeutic Exercise:  [x] See flow sheet :   Rationale: increase ROM, increase strength, improve coordination, improve balance and increase proprioception to improve the patients ability to perform daily activities with decreased pain and symptom levels      15 min Therapeutic Activity:  [x]  See flow sheet :   Rationale: increase ROM, increase strength, improve coordination, improve balance and increase proprioception  to improve the patients ability to perform daily activities with decreased pain and symptom levels       20 min Neuromuscular Re-education:  [x]  See flow sheet :   Rationale: increase ROM, increase strength, improve coordination, improve balance and increase proprioception  to improve the patients ability to perform daily activities with decreased pain and symptom levels    10 min Manual Therapy:  Vacuum cupping to right lat, posterior right shoulder, bilateral lower thoracic and lumbar paraspinals in left s/L with right iliac depression    The manual therapy interventions were performed at a separate and distinct time from the therapeutic activities interventions.   Rationale: decrease pain, increase ROM, increase tissue extensibility and increase postural awareness to perform daily activities with decreased pain and symptom levels            With   [] TE   [] TA   [] neuro   [] other: Patient Education: [x] Review HEP    [] Progressed/Changed HEP based on:   [] positioning   [] body mechanics   [] transfers   [] heat/ice application    [] other:      Other Objective/Functional Measures:   LORA Special Tests (pre/post)  HGIR:                                                 Right: 63/74             Left: 70/80  Hip Add Drop Test:                           Right: +/-            Left:+/midline  Trunk Rot                                           Right: 14.5 in/13 in Left 14 in /13 in  Shoulder Horizontal Abduction          Right: 35 /40             Left: 27 /38     Strong red reaction to cupping especially at T8-9 paraspinals. Pain Level (0-10 scale) post treatment: 0    ASSESSMENT/Changes in Function:   Pt very TTP along lats bilaterally. Improved thoracic retraction with exercises. Is showing progress in carry over from session to session with repositioning. Patient will continue to benefit from skilled PT services to modify and progress therapeutic interventions, address functional mobility deficits, address ROM deficits, address strength deficits, analyze and address soft tissue restrictions, analyze and cue movement patterns, analyze and modify body mechanics/ergonomics, assess and modify postural abnormalities and instruct in home and community integration to attain remaining goals. []  See Plan of Care  []  See progress note/recertification  []  See Discharge Summary         Progress towards goals / Updated goals:  1.  Pt will demonstrate ability to complete pull ups without right shoulder pain to demonstrate improved scapular strength.   Eval:pain with 1 pullup, increased thoracic extension and rib flare, challenged with PPT  Last PN: 1 pull up without elbow or shoulder pain   Current:      2.  Pt trunk rotation will improve to at least 15in to demonstrate improvef functional thoracic mobility  Eval: 17in right, 15.5in left   Last PN:t15 in bilaterall at end of session  Current:      3.  Pt right shoulder flexion and IR AROM  will improve to CHALO [facility] allow pt to complete ADLs with increased ease. Eval: 134* flexion ,25* IR  Last PN: 73* HGGIR, 160* shoulder flexion   Current: 75* HGIR, 160* shoulder flexion 11/4/20 -Progressing      4.  Pt FOTO score will increase by >/ = 8 points to show improvement in subjective function. Eval:72  Last PN:  71    Current:     Updated Goals:  1. Pt will report no elbow pain with daily activities -such as washing hands, pulling on seat belt - to improve quality of life.   Last PN: no pain with ADLs as long as focusses on wrist position, pain if sleeps prone  Current: Progressing 11/4/20 pt reports minimal to no elbow pain in last 5 days        PLAN  [x]  Upgrade activities as tolerated     []  Continue plan of care  []  Update interventions per flow sheet       []  Discharge due to:_  []  Other:_      Ryne Vang PT, DPT 11/4/2020  9:51 AM    Future Appointments   Date Time Provider [place] Tsai   11/10/2020  3:30 PM Marino Bean [facility]   11/12/2020 11:45 AM Jaime Shabazz PT, DPT [facility]   11/16/2020  3:15 PM Jaime Shabazz PT, DPT [facility]   11/18/2020  8:45 AM Marino Bean [facility]

## 2020-11-05 ENCOUNTER — APPOINTMENT (OUTPATIENT)
Dept: PHYSICAL THERAPY | Age: 50
End: 2020-11-05
Payer: OTHER GOVERNMENT

## 2020-11-10 ENCOUNTER — HOSPITAL ENCOUNTER (OUTPATIENT)
Dept: PHYSICAL THERAPY | Age: 50
Discharge: HOME | End: 2020-11-10
Payer: OTHER GOVERNMENT

## 2020-11-10 PROCEDURE — 97530 THERAPEUTIC ACTIVITIES: CPT

## 2020-11-10 PROCEDURE — 97110 THERAPEUTIC EXERCISES: CPT

## 2020-11-10 PROCEDURE — 97140 MANUAL THERAPY 1/> REGIONS: CPT

## 2020-11-10 PROCEDURE — 97112 NEUROMUSCULAR REEDUCATION: CPT

## 2020-11-10 NOTE — PROGRESS NOTES
PT DAILY TREATMENT NOTE    Patient Name: Jamari Vicente  Date:11/10/2020  : 1970  [x]  Patient  Verified  Payor:  / Plan: Lex Ordo[address]ez [address] / Product Type:  /    In time:3:18  Out time:4:18  Total Treatment Time (min): 60  Total Timed Codes (min): 60  1:1 Treatment Time ([address] only): 60   Visit #: 38 of 41    Treatment Area: Right shoulder pain [M25.511]  Pain in right arm [M79.601]    SUBJECTIVE  Pain Level (0-10 scale): 0  Any medication changes, allergies to medications, adverse drug reactions, diagnosis change, or new procedure performed?: [x] No    [] Yes (see summary sheet for update)  Subjective functional status/changes:   [] No changes reported  \"Good. I have only been waking up twice due to the pain. \"    OBJECTIVE     22 min Therapeutic Exercise:  [x] See flow sheet :   Rationale: increase ROM and increase strength to improve the patients ability to perform daily activities with decreased pain and symptom levels    15 min Therapeutic Activity:  [x]  See flow sheet :   Rationale: increase strength, improve coordination and increase proprioception  to improve the patients ability to perform daily activities with decreased pain and symptom levels     15 min Neuromuscular Re-education:  [x]  See flow sheet :   Rationale: increase strength, improve coordination and increase proprioception  to improve the patients ability to perform daily activities with decreased pain and symptom levels    8 min Manual Therapy:  Vacuum cupping to right lat, posterior right shoulder, bilateral lower thoracic and lumbar paraspinals in left s/L with right iliac depression    Rationale: decrease pain, increase ROM and increase tissue extensibility to improve the patients ability to perform daily activities with decreased pain and symptom levels  The manual therapy interventions were performed at a separate and distinct time from the therapeutic activities interventions.     With   [] TE   [] TA [] neuro   [] other: Patient Education: [x] Review HEP    [] Progressed/Changed HEP based on:   [] positioning   [] body mechanics   [] transfers   [] heat/ice application    [] other:      Other Objective/Functional Measures:  HGGIR right 56*, WFL shoulder abduction right   Positive shoulder abduction on left     Pain Level (0-10 scale) post treatment: 0    ASSESSMENT/Changes in Function: Pt reporting pain still waking him up at night 2x with laying on stomach however no longer TTP over right elbow. Pt very challenged with QP activities especially stabilizing on left LE due to decreased core strength and pelvic stability. Right shoulder AROM continues to improve each with less pain with overhead activities. Patient will continue to benefit from skilled PT services to modify and progress therapeutic interventions, address functional mobility deficits, address ROM deficits, address strength deficits, analyze and cue movement patterns, analyze and modify body mechanics/ergonomics, assess and modify postural abnormalities and instruct in home and community integration to attain remaining goals. []  See Plan of Care  []  See progress note/recertification  []  See Discharge Summary         Progress towards goals / Updated goals:  1.  Pt will demonstrate ability to complete pull ups without right shoulder pain to demonstrate improved scapular strength. Eval:pain with 1 pullup, increased thoracic extension and rib flare, challenged with PPT  Last PN: 1 pull up without elbow or shoulder pain   Current:      2.  Pt trunk rotation will improve to at least 15in to demonstrate improvef functional thoracic mobility  Eval: 17in right, 15.5in left   Last PN:t15 in bilaterall at end of session  Current:      3.  Pt right shoulder flexion and IR AROM  will improve to CHALO [facility] allow pt to complete ADLs with increased ease.   Eval: 134* flexion ,25* IR  Last PN: 73* HGGIR, 160* shoulder flexion   Current: 70* HGGIR, 160* shoulder flexion 11/2     4.  Pt FOTO score will increase by >/ = 8 points to show improvement in subjective function. Eval:72  Last PN:  71    Current:     Updated Goals:  1. Pt will report no elbow pain with daily activities -such as washing hands, pulling on seat belt - to improve quality of life.   Last PN: no pain with ADLs as long as focusses on wrist position, pain if sleeps prone  Current:  no longer TTP over right elbow, pain waking pt up 2x night 11/10       PLAN  []  Upgrade activities as tolerated     [x]  Continue plan of care  []  Update interventions per flow sheet       []  Discharge due to:_  []  Other:_      Toby Bean 11/10/2020  3:16 PM    Future Appointments   Date Time Provider Tracy Tsai   11/10/2020  3:30 PM Toby Bean [facility]   11/12/2020 11:45 AM Esteban Vazquez PT, DPT [facility]   11/16/2020  3:15 PM Esteban Vazquez PT, DPT [facility]   11/18/2020  8:45 AM Toby Bean [facility]

## 2020-11-12 ENCOUNTER — HOSPITAL ENCOUNTER (OUTPATIENT)
Dept: PHYSICAL THERAPY | Age: 50
Discharge: HOME | End: 2020-11-12
Payer: OTHER GOVERNMENT

## 2020-11-12 PROCEDURE — 97110 THERAPEUTIC EXERCISES: CPT

## 2020-11-12 PROCEDURE — 97112 NEUROMUSCULAR REEDUCATION: CPT

## 2020-11-12 PROCEDURE — 97530 THERAPEUTIC ACTIVITIES: CPT

## 2020-11-12 NOTE — PROGRESS NOTES
PT DAILY TREATMENT NOTE    Patient Name: Michelle Kumar  Date:2020  : 1970  [x]  Patient  Verified  Payor:  / Plan: [facility]  [facility] / Product Type:  /    In time:11:35 am  Out time:12:40 pm  Total Treatment Time (min): 60  Total Timed Codes (min): 60  Visit #: 39 of 41    Treatment Area: Right shoulder pain [M25.511]  Pain in right arm [M79.601]    SUBJECTIVE  Pain Level (0-10 scale): 0  Any medication changes, allergies to medications, adverse drug reactions, diagnosis change, or new procedure performed?: [x] No    [] Yes (see summary sheet for update)  Subjective functional status/changes:   [] No changes reported  \"I am actually feeling pretty good. \"  Had MRI on left knee last week.     OBJECTIVE      25 min Therapeutic Exercise:  [x] See flow sheet :   Rationale: increase ROM, increase strength, improve coordination and increase proprioception to improve the patients ability to perform daily activities with decreased pain and symptom levels      20 min Therapeutic Activity:  [x]  See flow sheet :   Rationale: increase ROM, increase strength, improve coordination and increase proprioception  to improve the patients ability to perform daily activities with decreased pain and symptom levels       15 min Neuromuscular Re-education:  [x]  See flow sheet :   Rationale: increase ROM, increase strength, improve coordination and increase proprioception  to improve the patients ability to perform daily activities with decreased pain and symptom levels            With   [] TE   [] TA   [] neuro   [] other: Patient Education: [x] Review HEP    [] Progressed/Changed HEP based on:   [] positioning   [] body mechanics   [] transfers   [] heat/ice application    [] other:      Other Objective/Functional Measures:   LORA Special Tests (pre/post)  HGIR:                                                 Right: 61/72             Left: 75/80  Hip Add Drop Test: Right: +/-            Left:+/midline  Trunk Rot                                           Right: 14. In/14 in Left 14.5 in  /14 in   Shoulder Horizontal Abduction          Right: 40 /45             Left: 29 /35    Active Warm-up:  Knee Hugs  Retro walking  Lunge Rot to frog squat   Inch worms    No pain with 10 push ups       Pain Level (0-10 scale) post treatment: 0    ASSESSMENT/Changes in Function:   Pt was very challenged with active warm-up. Noted very restricted thoracic mobility with lunge rot to a frog squat. Noted visible fatigue with inch worms at obliques. Improved ability to recruit hamstrings with activities - requires less tactile cues and facilitation. Patient will continue to benefit from skilled PT services to modify and progress therapeutic interventions, address functional mobility deficits, address ROM deficits, address strength deficits, analyze and address soft tissue restrictions, analyze and cue movement patterns, analyze and modify body mechanics/ergonomics, assess and modify postural abnormalities and instruct in home and community integration to attain remaining goals. []  See Plan of Care  []  See progress note/recertification  []  See Discharge Summary         Progress towards goals / Updated goals:  1.  Pt will demonstrate ability to complete pull ups without right shoulder pain to demonstrate improved scapular strength.   Eval:pain with 1 pullup, increased thoracic extension and rib flare, challenged with PPT  Last PN: 1 pull up without elbow or shoulder pain   Current: Pt has not attempted pull ups at the gym 11/12/20       2.  Pt trunk rotation will improve to at least 15in to demonstrate improvef functional thoracic mobility  Eval: 17in right, 15.5in left   Last PN:t15 in bilaterall at end of session  Current: Progressing 11/12/20 - 14 in at end of session      3.  Pt right shoulder flexion and IR AROM  will improve to CHALO [facility] allow pt to complete ADLs with increased ease.  Eval: 134* flexion ,25* IR  Last PN: 73* HGGIR, 160* shoulder flexion   Current: 72* HGGIR, 160* shoulder flexion 11/12/20 progressing      4.  Pt FOTO score will increase by >/ = 8 points to show improvement in subjective function. Eval:72  Last PN:  71    Current:     Updated Goals:  1. Pt will report no elbow pain with daily activities -such as washing hands, pulling on seat belt - to improve quality of life.   Last PN: no pain with ADLs as long as focusses on wrist position, pain if sleeps prone  Current:  no longer TTP over right elbow, pain waking pt up 2x night 11/10    PLAN  []  Upgrade activities as tolerated     []  Continue plan of care  []  Update interventions per flow sheet       []  Discharge due to:_  []  Other:_      Abi Ortiz, PT, DPT 11/12/2020  11:46 AM    Future Appointments   Date Time Provider Tracy Tsai   11/16/2020  3:15 PM Jazzy Ashley, PT, DPT [facility]   11/18/2020  8:45 AM Brissa [facility] IMPROVE VTE Individual Risk Assessment          RISK                                                          Points  [  ] Previous VTE                                                3  [  ] Thrombophilia                                             2  [ x ] Lower limb paralysis                                   2        (unable to hold up >15 seconds)    [  ] Current Cancer                                             2         (within 6 months)  [ x ] Immobilization > 24 hrs                              1  [  ] ICU/CCU stay > 24 hours                             1  [ x ] Age > 60                                                         1    IMPROVE VTE Score: 4    Heparin Drip

## 2020-11-16 ENCOUNTER — HOSPITAL ENCOUNTER (OUTPATIENT)
Dept: PHYSICAL THERAPY | Age: 50
Discharge: HOME | End: 2020-11-16
Payer: OTHER GOVERNMENT

## 2020-11-16 PROCEDURE — 97140 MANUAL THERAPY 1/> REGIONS: CPT

## 2020-11-16 PROCEDURE — 97112 NEUROMUSCULAR REEDUCATION: CPT

## 2020-11-16 PROCEDURE — 97530 THERAPEUTIC ACTIVITIES: CPT

## 2020-11-16 PROCEDURE — 97110 THERAPEUTIC EXERCISES: CPT

## 2020-11-16 NOTE — PROGRESS NOTES
PT DAILY TREATMENT NOTE    Patient Name: Danny Austin  Date:2020  : 1970  [x]  Patient  Verified  Payor:  / Plan: [facility] [address] / Product Type:  /    In time:3:10 pm  Out time:4:05 pm   Total Treatment Time (min): 55  Total Timed Codes (min): 55  Visit #: 40 of 41    Treatment Area: Right shoulder pain [M25.511]  Pain in right arm [M79.601]    SUBJECTIVE  Pain Level (0-10 scale): 0  Any medication changes, allergies to medications, adverse drug reactions, diagnosis change, or new procedure performed?: [x] No    [] Yes (see summary sheet for update)  Subjective functional status/changes:   [] No changes reported  \"I had some pain with waking but it from sleeping. Right around the elbow. \"    OBJECTIVE      15 min Therapeutic Exercise:  [x] See flow sheet :   Rationale: increase ROM, increase strength, improve coordination and increase proprioception to improve the patients ability to perform daily activities with decreased pain and symptom levels      10 min Therapeutic Activity:  [x]  See flow sheet :   Rationale: increase ROM, increase strength, improve coordination and increase proprioception  to improve the patients ability to perform daily activities with decreased pain and symptom levels       15 min Neuromuscular Re-education:  [x]  See flow sheet :   Rationale: increase ROM, increase strength, improve coordination, improve balance and increase proprioception  to improve the patients ability to perform daily activities with decreased pain and symptom levels      15 min Manual Therapy:  LORA AIC correction, Sternal mobilization with active pelvic tilt, Superior T4 (LORA technique), Right subclavius release (LORA technique), LORA infraclavicular rib pump with active breath   Obtained consent for hand placement      Rationale: decrease pain, increase ROM, increase tissue extensibility and increase postural awareness to improve the patients ability to perform daily activities with decreased pain and symptom levels    The manual therapy interventions were performed at a separate and distinct time from the therapeutic activities interventions. With   [] TE   [] TA   [] neuro   [] other: Patient Education: [x] Review HEP    [] Progressed/Changed HEP based on:   [] positioning   [] body mechanics   [] transfers   [] heat/ice application    [] other:      Other Objective/Functional Measures:   LORA Special Tests (pre/post)  HGIR:                                                 Right: 64/74             Left: 68/76  Hip Add Drop Test:                           Right: midline/-         Left:+/midlien  Trunk Rot                                           Right: 16 in/14.5 Left 16 in  /14.5  Shoulder Horizontal Abduction          Right: 45 /WFL             Left: 24 /38          Pain Level (0-10 scale) post treatment: 0    ASSESSMENT/Changes in Function:   Demonstrated improved carryover with active warmup. Very limited apical expansion bilaterally. Responds very well to trunk rotation. Reports most pain with waking in morning if has been sleeping in prone. Patient will continue to benefit from skilled PT services to modify and progress therapeutic interventions, address functional mobility deficits, address ROM deficits, address strength deficits, analyze and address soft tissue restrictions, analyze and cue movement patterns, analyze and modify body mechanics/ergonomics, assess and modify postural abnormalities and instruct in home and community integration to attain remaining goals. []  See Plan of Care  []  See progress note/recertification  []  See Discharge Summary         Progress towards goals / Updated goals:  1.  Pt will demonstrate ability to complete pull ups without right shoulder pain to demonstrate improved scapular strength.   Eval:pain with 1 pullup, increased thoracic extension and rib flare, challenged with PPT  Last PN: 1 pull up without elbow or shoulder pain   Current: Pt has not attempted pull ups at the gym 11/12/20       2.  Pt trunk rotation will improve to at least 15in to demonstrate improvef functional thoracic mobility  Eval: 17in right, 15.5in left   Last PN:t15 in bilaterall at end of session  Current: Progressing 11/12/20 - 14 in at end of session      3.  Pt right shoulder flexion and IR AROM  will improve to CHALO [facility] allow pt to complete ADLs with increased ease. Eval: 134* flexion ,25* IR  Last PN: 73* HGGIR, 160* shoulder flexion   Current: 74* HGGIR, 160* shoulder flexion 11/16/20 progressing      4.  Pt FOTO score will increase by >/ = 8 points to show improvement in subjective function. Eval:72  Last PN:  71    Current:     Updated Goals:  1. Pt will report no elbow pain with daily activities -such as washing hands, pulling on seat belt - to improve quality of life.   Last PN: no pain with ADLs as long as focusses on wrist position, pain if sleeps prone  Current:  no longer TTP over right elbow, pain waking pt up 2x night 11/10    PLAN  [x]  Upgrade activities as tolerated     []  Continue plan of care  []  Update interventions per flow sheet       []  Discharge due to:_  []  Other:_      Itzel Heard PT, DPT 11/16/2020  3:28 PM    Future Appointments   Date Time Provider Tracy Tsai   11/18/2020  8:45 AM Danica Bean [facility]

## 2020-11-18 ENCOUNTER — HOSPITAL ENCOUNTER (OUTPATIENT)
Dept: PHYSICAL THERAPY | Age: 50
Discharge: HOME | End: 2020-11-18
Payer: OTHER GOVERNMENT

## 2020-11-18 PROCEDURE — 97530 THERAPEUTIC ACTIVITIES: CPT

## 2020-11-18 PROCEDURE — 97112 NEUROMUSCULAR REEDUCATION: CPT

## 2020-11-18 PROCEDURE — 97110 THERAPEUTIC EXERCISES: CPT

## 2020-11-18 PROCEDURE — 97140 MANUAL THERAPY 1/> REGIONS: CPT

## 2020-11-18 NOTE — PROGRESS NOTES
PT DISCHARGE DAILY NOTE AND [id number]    Patient name: Bon Flores [employer]   Referral source: Donny Hicks MD : 1970               Medical Diagnosis: Right shoulder pain [M25.511]  Pain in right arm [M79.601]    Onset Date:3 weeks               Treatment Diagnosis: right shoulder pain   Prior Hospitalization: see medical history Provider#: [id number]   Medications: Verified on Patient summary List    Comorbidities: bilateral knee scope   Prior Level of Function: working out 5-6days a week    Visits from Start of Care: 41    Missed Visits: 0    Reporting Period : 19 to 20    Date:2020  : 1970  [x]  Patient  Verified  Payor:  / Plan: Lex Or[address]ez 74 / Product Type:  /    In time:8:32  Out time:9:35  Total Treatment Time (min): 63  Visit #: 41 of 41    SUBJECTIVE  Pain Level (0-10 scale): 0  Any medication changes, allergies to medications, adverse drug reactions, diagnosis change, or new procedure performed?: [x] No    [] Yes (see summary sheet for update)  Subjective functional status/changes:   [] No changes reported  \"Good. \"    OBJECTIVE    28 min Therapeutic Exercise:  [] See flow sheet :   Rationale: increase ROM and increase strength to improve the patients ability to perform daily activities with decreased pain and symptom levels    15 min Therapeutic Activity:  [x]  See flow sheet :   Rationale: increase strength, improve coordination and increase proprioception  to improve the patients ability to perform daily activities with decreased pain and symptom levels     10 min Neuromuscular Re-education:  []  See flow sheet :   Rationale: increase strength, improve coordination and increase proprioception  to improve the patients ability to perform daily activities with decreased pain and symptom levels    10 min Manual Therapy:  Subclavius release, manual pecstretch LORA infraclavicular rib pump with active breath   Obtained consent for hand placement     The manual therapy interventions were performed at a separate and distinct time from the therapeutic activities interventions. Rationale: decrease pain, increase ROM and increase tissue extensibility to perform daily activities with decreased pain and symptom levels          With   [] TE   [] TA   [] neuro   [] other: Patient Education: [x] Review HEP    [] Progressed/Changed HEP based on:   [] positioning   [] body mechanics   [] transfers   [] heat/ice application    [] other:      Other Objective measures:  see updated goals below     Pain Level (0-10 scale) post treatment: 0    Summary of Care:  1.  Pt will demonstrate ability to complete pull ups without right shoulder pain to demonstrate improved scapular strength. Eval:pain with 1 pullup, increased thoracic extension and rib flare, challenged with PPT  Last PN: 1 pull up without elbow or shoulder pain   Current:able to complete pull ups without right shoulder pain or elbow pain in clinic with proper form goal MET     2.  Pt trunk rotation will improve to at least 15in to demonstrate improvef functional thoracic mobility  Eval: 17in right, 15.5in left   Last PN:t15 in bilaterall at end of session  Current: 14.5in bilaterally goal MET     3.  Pt right shoulder flexion and IR AROM  will improve to CHALO [facility] allow pt to complete ADLs with increased ease. Eval: 134* flexion ,25* IR  Last PN: 73* HGGIR, 160* shoulder flexion   Current: 74* HGGIR, 160* shoulder flexion progressing well      4.  Pt FOTO score will increase by >/ = 8 points to show improvement in subjective function. Eval:72  YZYH ZN:  51    Current:83 goal MET     Updated Goals:  1. Pt will report no elbow pain with daily activities -such as washing hands, pulling on seat belt - to improve quality of life.   Last PN: no pain with ADLs as long as focusses on wrist position, pain if sleeps prone  Current:  no longer having pain with ADLs in right elbow, only pain with touching it and when waking up on stomach progressing well     ASSESSMENT/Changes in Function: Pt presented to therapy with c/c right shoulder and medial elbow pain. Pt has attended 41 sessions including initial eval with making great progress towards goals. Pt no longer c/o right shoulder pain only tightness with pain in elbow with palpitation or sleeping on stomach. Pt now able to workout and complete pull ups and push ups without pain and good form. Overall right shoulder ROM has improved significantly with now [facility]. Pt is ready to be DC at this time due to progress towards goals with updated HEP given to continue with at gym.        PLAN  [x]Discontinue therapy: [x]Patient has reached or is progressing toward set goals      []Patient is non-compliant or has abdicated      []Due to lack of appreciable progress towards set goals    Prosper STERLING Remesic 11/18/2020  8:40 AM

## 2020-11-19 ENCOUNTER — APPOINTMENT (OUTPATIENT)
Dept: PHYSICAL THERAPY | Age: 50
End: 2020-11-19
Payer: OTHER GOVERNMENT

## 2020-12-02 ENCOUNTER — HOSPITAL ENCOUNTER (OUTPATIENT)
Dept: PHYSICAL THERAPY | Age: 50
Discharge: HOME | End: 2020-12-02
Payer: OTHER GOVERNMENT

## 2020-12-02 PROCEDURE — 97112 NEUROMUSCULAR REEDUCATION: CPT

## 2020-12-02 PROCEDURE — 97110 THERAPEUTIC EXERCISES: CPT

## 2020-12-02 PROCEDURE — 97161 PT EVAL LOW COMPLEX 20 MIN: CPT

## 2020-12-02 PROCEDURE — 97162 PT EVAL MOD COMPLEX 30 MIN: CPT

## 2020-12-02 PROCEDURE — 97530 THERAPEUTIC ACTIVITIES: CPT

## 2020-12-02 NOTE — PROGRESS NOTES
PT DAILY TREATMENT NOTE    Patient Name: Rodolfo Ling  Date:2020  : 1970  [x]  Patient  Verified  Payor:  / Plan: Lex Ordoñez 74 / Product Type:  /    In time:2:32 pm  Out time:3:30 pm   Total Treatment Time (min): 58  Total Timed Codes (min): 28  1:1 Treatment Time ( only): 58   Visit #: 1 of 14    Treatment Area: Right knee pain [M25.561]    SUBJECTIVE  Pain Level (0-10 scale): 0  Any medication changes, allergies to medications, adverse drug reactions, diagnosis change, or new procedure performed?: [x] No    [] Yes (see summary sheet for update)  Subjective functional status/changes:   [] No changes reported    Hx Present Illness: C/C Right knee pain   Insidious in onset 2020 \"I just woke up\"  Reported constant pain x10 days, now more intermittent  MRI - per referral showed lateral meniscus tear  Pt has substantial medical hx with 3 knee surgeries on both right and left knees - medial meniscus all 6 surgeries   Increase in pain with running, descending stairs   Reported intermittent catching and locking with getting in/out of the car  Denies buckling or giving way     Pain:  _6__/10 max       __0_/10 min     _0___/10 now    Location: anterior-lateral right knee      [] Sharp    [] Dull      [] Burning     [x]  Aching     [] Throbbing      [] Tingling     [] Other:       []  Constant                   [x] Intermittent        Previous treatment:   NSAIDs     PMHX: PMHx/Surgical Hx:  3 surgeries on Right Knee and 3 surgeries on Left Knee    Social/Recreation/Work: 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 If I could go back to running. \"    Cognition: A & O x 4        OBJECTIVE    30 min [x]Eval                  []Re-Eval        18 min Neuromuscular Re-education:  [x]  See flow sheet : Rationale: increase ROM, increase strength, improve coordination, improve balance and increase proprioception  to improve the patients ability to perform daily activities with decreased pain and symptom levels            With   [] TE   [x] TA - 10 min    [] neuro   [] other: Patient Education: [x] Review HEP    [] Progressed/Changed HEP based on:   [] positioning   [] body mechanics   [] transfers   [] heat/ice application    [x] other: pt education regarding exam findings, anatomy involved, POC      Other Objective/Functional Measures:    Movement/gait:  Decreased thoracic and pelvic rotation, decreased arm swing right >left, lateral foot strike     Visual Inspection: Thoracic: flattened  Lumbar: decreased   Shoulder/Scapula: abducted bilaterally, right shoulder lower     Palpation: mild tenderness to anterior-lateral joint line                           AROM Right Left   Knee Ext - Flex 2-137* WFL        Hip IR 18 12   Hip ER 17 17               Strength Right Left   Quad Set Strong  strong   Hip Flex 5 5   Knee Ext 5 5   Hamstrings 4 4   Hip Abd 4 4            Special Tests Right Left   Chaz's  - -   Aurelio - -   Theterranceely - (unable rotate) -    Passive SLR 67 60   Felix Test + rec fem, hip flex + rec fem, hip flex    90-90 SLR hamstring flexibility  36* from straight         LORA Special Tests   HGIR:                                                 Right: 64/             Left: 75  Hip Add Drop Test:                           Right: +             Left:+ unable to test  Trunk Rot                                           Right: 16.5 in   Left 17 in    Shoulder Horizontal Abduction          Right: 40              Left: 31     Extension Drop                                   Right : -          Left:       Other Comments: Standing Forward Flexion 9.5 in from floor, right lateralization   Gillet: hypomobile bilaterally   LORA Squat: 2/5, pronounced valgus on right      Pain Level (0-10 scale) post treatment: 0    ASSESSMENT/Changes in Function:   Pt is a pleasant and motivated 48 y.o. male with C/C of acute onset lateral right knee pain in October 2020. Pt reports constant high levels of pain with ADLs and weight bearing x10 days and then pain decreased to intermittent. MRI confirmed lateral meniscus tear on right. Pt has significant hx of bilateral knee surgeries - medial meniscal repairs as well as hx of LBP. Objective measures include decreased trunk rot, decreased hip IR and ER ROM, postural and gait adaptations, dysfunctional squat and TTP along lateral joint line. Patient will continue to benefit from skilled PT services to modify and progress therapeutic interventions, address functional mobility deficits, address ROM deficits, address strength deficits, analyze and address soft tissue restrictions, analyze and cue movement patterns, analyze and modify body mechanics/ergonomics, assess and modify postural abnormalities and instruct in home and community integration to attain remaining goals. [x]  See Plan of Care  []  See progress note/recertification  []  See Discharge Summary         Progress towards goals / Updated goals:  Short Term Goals: STG- To be accomplished in 5 visit(s):  1. Pt will be independent with HEP to encourage prophylaxis. Eval:dispensed     Long Term Goals: LTG- To be accomplished in 14 visit(s):  1. Pt will be able to descend > 2 flights stairs without pain to improve mobility around office and home. Eval:pain with descending     2. Pt will improve hip adduction drop to negative bilaterally with HEP to indicate femoral-acetabular mobility needed for gait. Eval:+ bilaterally, unable to assume test position on the left    3. Pt will improve LORA squat to 4/5 to indicate mobility and strength needed recreational activities  Eval:LORA Squat: 2/5, pronounced valgus on right        4. Pt FOTO score will increase by 8 points to show improvement in  function.   Eval:65 (risk adjusted score 51)  Current: will address at visit 5      PLAN  [x]  Upgrade activities as tolerated     []  Continue plan of care  []  Update interventions per flow sheet       []  Discharge due to:_  []  Other:_      Danny Varela, PT, DPT 12/2/2020  1:22 PM    Future Appointments   Date Time Provider [place] Tsai   12/2/2020  2:30 PM Marcia Kelly, PT, DPT [facility]

## 2020-12-02 NOTE — PROGRESS NOTES
[facility] at the [address], [place], [address] [facility]  Phone: [phone]      Fax:  [phone]       Plan of Care/ Statement of Necessity for Physical Therapy Services      Patient name: Chanda Dooley Start of Care: 2020   Referral source: Suellen Hubbard MD : 1970    Medical Diagnosis: Right knee pain [M25.561]   Onset Date:2020    Treatment Diagnosis: Right Knee Pain    Prior Hospitalization: see medical history Provider#: [id number]   Medications: Verified on Patient summary List    Comorbidities: previous knee surgeries, Hx of LBP   Prior Level of Function: Increase in pain with running, descending stairs   Reported intermittent catching and locking with getting in/out of the car      The Plan of Care and following information is based on the information from the initial evaluation. Assessment/ key information:   Pt is a pleasant and motivated 48 y.o. male with C/C of acute onset lateral right knee pain in 2020. Pt reports constant high levels of pain with ADLs and weight bearing x10 days and then pain decreased to intermittent. MRI confirmed lateral meniscus tear on right. Pt has significant hx of bilateral knee surgeries - medial meniscal repairs as well as hx of LBP. Objective measures include decreased trunk rot, decreased hip IR and ER ROM, postural and gait adaptations, dysfunctional squat and TTP along lateral joint line.      Evaluation Complexity History MEDIUM  Complexity : 1-2 comorbidities / personal factors will impact the outcome/ POC ; Examination HIGH Complexity : 4+ Standardized tests and measures addressing body structure, function, activity limitation and / or participation in recreation  ;Presentation LOW Complexity : Stable, uncomplicated  ;Clinical Decision Making MEDIUM Complexity : FOTO score of 26-74  Overall Complexity Rating: LOW   Problem List: pain affecting function, decrease ROM, decrease strength, impaired gait/ balance, decrease ADL/ functional abilitiies, decrease activity tolerance and decrease flexibility/ joint mobility   Treatment Plan may include any combination of the following: Therapeutic exercise, Therapeutic activities, Neuromuscular re-education, Physical agent/modality, Gait/balance training, Manual therapy and Patient education  Patient / Family readiness to learn indicated by: asking questions, trying to perform skills and interest  Persons(s) to be included in education: patient (P)  Barriers to Learning/Limitations: None  Patient Goal (s): improve my stability. If I could go back to running  Patient Self Reported Health Status: good  Rehabilitation Potential: good    Short Term Goals: STG- To be accomplished in 5 visit(s):  1. Pt will be independent with HEP to encourage prophylaxis. Eval:dispensed      Long Term Goals: LTG- To be accomplished in 14 visit(s):  1. Pt will be able to descend > 2 flights stairs without pain to improve mobility around office and home. Eval:pain with descending      2. Pt will improve hip adduction drop to negative bilaterally with HEP to indicate femoral-acetabular mobility needed for gait. Eval:+ bilaterally, unable to assume test position on the left     3. Pt will improve LORA squat to 4/5 to indicate mobility and strength needed recreational activities  Eval:LORA Squat: 2/5, pronounced valgus on right              4. Pt FOTO score will increase by 8 points to show improvement in  function. Eval:65 (risk adjusted score 51)  Current: will address at visit 5    Frequency / Duration: Patient to be seen 14 treatments did not set frequency due to holiday travel schedule.     Patient/ Caregiver education and instruction: Diagnosis, prognosis, self care, activity modification and exercises   [x]  Plan of care has been reviewed with SHAN Wilkins PT, DPT 12/2/2020 3:57 PM  _____________________________________________________________________  I certify that the above Therapy Services are being furnished while the patient is under my care. I agree with the treatment plan and certify that this therapy is necessary.     Physician's Signature:____________Date:_________TIME:________    [facility], Date and Time must be completed for valid certification **    Please sign and return to [facility] at the [address] [place] [place], [address] [facility]       Phone: [phone]      Fax:  [phone]

## 2020-12-04 ENCOUNTER — HOSPITAL ENCOUNTER (OUTPATIENT)
Dept: PHYSICAL THERAPY | Age: 50
Discharge: HOME | End: 2020-12-04
Payer: OTHER GOVERNMENT

## 2020-12-04 PROCEDURE — 97112 NEUROMUSCULAR REEDUCATION: CPT

## 2020-12-04 PROCEDURE — 97110 THERAPEUTIC EXERCISES: CPT

## 2020-12-04 NOTE — PROGRESS NOTES
PT DAILY TREATMENT NOTE    Patient Name: Etta Martines  Date:2020  : 1970  [x]  Patient  Verified  Payor:  / Plan: Lex Ordoñez 74 / Product Type:  /    In time: 8:45  Out time: 9:45  Total Treatment Time (min): 60  Total Timed Codes (min): 60  1:1 Treatment Time (MC only): 60  Visit #: 2 of 14  Treatment Area: Right knee pain [M25.561]    SUBJECTIVE  Pain Level (0-10 scale): 0  Any medication changes, allergies to medications, adverse drug reactions, diagnosis change, or new procedure performed?: [x] No    [] Yes (see summary sheet for update)  Subjective functional status/changes:   [] No changes reported  \"Good. \"    OBJECTIVE    40 min Therapeutic Exercise:  [x] See flow sheet :   Rationale: increase ROM and increase strength to improve the patients ability to perform daily activities with decreased pain and symptom levels      20 min Neuromuscular Re-education:  [x]  See flow sheet :   Rationale: increase ROM, increase strength, improve coordination, improve balance and increase proprioception  to improve the patients ability to perform daily activities with decreased pain and symptom levels            With   [] TE   [] TA   [] neuro   [] other: Patient Education: [x] Review HEP    [] Progressed/Changed HEP based on:   [] positioning   [] body mechanics   [] transfers   [] heat/ice application    [] other:      Other Objective/Functional Measures:   Hip adduction drop (+) bilaterally   Valgus with retro stairs and step downs    Pain Level (0-10 scale) post treatment:  0    ASSESSMENT/Changes in Function:   Pt had difficulty with activating glut med with standing clams due to tendency to rely on QL to hike hip. Pt also had difficulty with eccentric tap downs, prompting use of GTB to facilitate pt in activating gluts to decreased knee valgus.      Patient will continue to benefit from skilled PT services to modify and progress therapeutic interventions, address functional mobility deficits, address ROM deficits, address strength deficits, analyze and address soft tissue restrictions, analyze and cue movement patterns, analyze and modify body mechanics/ergonomics, assess and modify postural abnormalities and instruct in home and community integration to attain remaining goals. []  See Plan of Care  []  See progress note/recertification  []  See Discharge Summary         Progress towards goals / Updated goals:    Short Term Goals: STG- To be accomplished in 5 visit(s):  1.  Pt will be independent with HEP to encourage prophylaxis. Eval:dispensed      Long Term Goals: LTG- To be accomplished in 14 visit(s):  1.  Pt will be able to descend > 2 flights stairs without pain to improve mobility around office and home. Eval:pain with descending      2.  Pt will improve hip adduction drop to negative bilaterally with HEP to indicate femoral-acetabular mobility needed for gait. Eval:+ bilaterally, unable to assume test position on the left  Current: able to assume test position, however unable to drop past neutral progressing 12/3     3.  Pt will improve LORA squat to 4/5 to indicate mobility and strength needed recreational activities  Eval:LORA Squat: 2/5, pronounced valgus on right           4.  Pt FOTO score will increase by 8 points to show improvement in  function.   Eval:65 (risk adjusted score 51)  Current: will address at visit 5    PLAN  []  Upgrade activities as tolerated     [x]  Continue plan of care  []  Update interventions per flow sheet       []  Discharge due to:_  []  Other:_      Florencio Buckleyadam 12/4/2020  9:00 AM    Future Appointments   Date Time Provider [place] Tsai   12/7/2020  1:45 PM Eli lEmore PT, DPT [facility]   12/9/2020  8:45 AM Juliet Bean [facility]   12/14/2020  3:15 PM Eli Elmore PT, DPMELISSA [facility]   12/16/2020  1:45 PM Eli Elmore PT, DPMELISSA [facility]   12/29/2020  5:30 PM Juliet Bean [facility]   12/30/2020  1:15 Alfredo Pires [facility]

## 2020-12-07 ENCOUNTER — HOSPITAL ENCOUNTER (OUTPATIENT)
Dept: PHYSICAL THERAPY | Age: 50
Discharge: HOME | End: 2020-12-07
Payer: OTHER GOVERNMENT

## 2020-12-07 PROCEDURE — 97530 THERAPEUTIC ACTIVITIES: CPT

## 2020-12-07 PROCEDURE — 97110 THERAPEUTIC EXERCISES: CPT

## 2020-12-07 PROCEDURE — 97112 NEUROMUSCULAR REEDUCATION: CPT

## 2020-12-07 NOTE — PROGRESS NOTES
PT DAILY TREATMENT NOTE    Patient Name: Jon Lundberg  Date:2020  : 1970  [x]  Patient  Verified  Payor: ANTONY / Plan: Lex Ordoñez 74 / Product Type:  /    In time:1:42 pm  Out time:2:40 pm   Total Treatment Time (min): 58  Total Timed Codes (min): 62  Visit #: 3 of 14    Treatment Area: Right knee pain [M25.561]    SUBJECTIVE  Pain Level (0-10 scale): 0  Any medication changes, allergies to medications, adverse drug reactions, diagnosis change, or new procedure performed?: [x] No    [] Yes (see summary sheet for update)  Subjective functional status/changes:   [] No changes reported  \"No they (knees) are fine. Just the instability. \"    OBJECTIVE      15 min Therapeutic Exercise:  [x] See flow sheet :   Rationale: increase ROM, increase strength, improve coordination and increase proprioception to improve the patients ability to perform daily activities with decreased pain and symptom levels      13 min Therapeutic Activity:  [x]  See flow sheet :   Rationale: increase ROM, increase strength, improve coordination and increase proprioception  to improve the patients ability to . smfiun       25 min Neuromuscular Re-education:  [x]  See flow sheet :   Rationale: increase ROM, increase strength, improve coordination, improve balance and increase proprioception  to improve the patients ability to perform daily activities with decreased pain and symptom levels      5 min Manual Therapy:  Manual stretch of left posterior hip in right S/L   Rationale: decrease pain, increase ROM, increase tissue extensibility and increase postural awareness to improve the patients ability to perform daily activities with decreased pain and symptom levels    The manual therapy interventions were performed at a separate and distinct time from the therapeutic activities interventions.           With   [] TE   [] TA   [] neuro   [] other: Patient Education: [x] Review HEP    [] Progressed/Changed HEP based on:   [] positioning   [] body mechanics   [] transfers   [] heat/ice application    [] other:      Other Objective/Functional Measures:   LORA Special Tests (pre/post)  HGIR:                                                 Right: 65/80             Left: 71/85  Hip Add Drop Test:                           Right: midline/-         Left:+/midline  Trunk Rot                                           Right: 15.5 in/15 in    Left 15 in  /14 in     TTP at left ischial tuberosity     Pain Level (0-10 scale) post treatment: 0    ASSESSMENT/Changes in Function:   Reported onset of left ischial tuberosity pain over the weekend. Has had it in the past. Increased valgus with heel downs. Required max cues and props for glut max and adductor pull back. Responded well to pelvis repositioning. Patient will continue to benefit from skilled PT services to modify and progress therapeutic interventions, address functional mobility deficits, address ROM deficits, address strength deficits, analyze and address soft tissue restrictions, analyze and cue movement patterns, analyze and modify body mechanics/ergonomics, assess and modify postural abnormalities and instruct in home and community integration to attain remaining goals. []  See Plan of Care  []  See progress note/recertification  []  See Discharge Summary         Progress towards goals / Updated goals:  Short Term Goals: STG- To be accomplished in 5 visit(s):  1.  Pt will be independent with HEP to encourage prophylaxis. Eval:dispensed   Current: Progressing 12/7/20 reports compliance updated this session      Long Term Goals: LTG- To be accomplished in 14 visit(s):  1.  Pt will be able to descend > 2 flights stairs without pain to improve mobility around office and home. Eval:pain with descending      2.  Pt will improve hip adduction drop to negative bilaterally with HEP to indicate femoral-acetabular mobility needed for gait.   Eval:+ bilaterally, unable to assume test position on the left  Current: able to assume test position, however unable to drop past neutral progressing 12/3     3.  Pt will improve LORA squat to 4/5 to indicate mobility and strength needed recreational activities  Eval:LORA Squat: 2/5, pronounced valgus on right           4.  Pt FOTO score will increase by 8 points to show improvement in  function.   Eval:65 (risk adjusted score 51)  Current: will address at visit 5    PLAN  [x]  Upgrade activities as tolerated     []  Continue plan of care  []  Update interventions per flow sheet       []  Discharge due to:_  []  Other:_      Burgess Ruano, PT, DPT 12/7/2020  1:48 PM    Future Appointments   Date Time Provider [place] Tsai   12/9/2020  8:45 AM Taina Bean [facility]   12/14/2020  3:15 PM Netta Canavan, PT, DPT [facility]   12/16/2020  1:45 PM Netta Canavan, PT, DPT [facility]   12/29/2020  5:30 PM Taina Bean [facility]   12/30/2020  1:15 PM Taina Bean [facility]

## 2020-12-09 ENCOUNTER — HOSPITAL ENCOUNTER (OUTPATIENT)
Dept: PHYSICAL THERAPY | Age: 50
Discharge: HOME | End: 2020-12-09
Payer: OTHER GOVERNMENT

## 2020-12-09 ENCOUNTER — APPOINTMENT (OUTPATIENT)
Dept: PHYSICAL THERAPY | Age: 50
End: 2020-12-09
Payer: OTHER GOVERNMENT

## 2020-12-09 PROCEDURE — 97530 THERAPEUTIC ACTIVITIES: CPT

## 2020-12-09 PROCEDURE — 97112 NEUROMUSCULAR REEDUCATION: CPT

## 2020-12-09 PROCEDURE — 97110 THERAPEUTIC EXERCISES: CPT

## 2020-12-09 NOTE — PROGRESS NOTES
PT DAILY TREATMENT NOTE    Patient Name: Nieves Baptiste  Date:2020  : 1970  [x]  Patient  Verified  Payor:  / Plan: Lex Ordoñez 74 / Product Type:  /    In time: 8:40 Out time: 9:45  Total Treatment Time (min): 65   Total Timed Codes (min): 65  1:1 Treatment Time ( [address] only): 54   Visit #: 4 of 14    Treatment Area: Right knee pain [M25.561]    SUBJECTIVE  Pain Level (0-10 scale): 0  Any medication changes, allergies to medications, adverse drug reactions, diagnosis change, or new procedure performed?: [x] No    [] Yes (see summary sheet for update)  Subjective functional status/changes:   [] No changes reported  \"I'm feeling good\"    OBJECTIVE       30 min Therapeutic Exercise:  [x] See flow sheet :   Rationale: increase ROM, increase strength, improve coordination and increase proprioception to improve the patients ability to perform daily activities with decreased pain and symptom levels    15 min Therapeutic Activity:  [x]  See flow sheet :   Rationale: increase ROM, increase strength, improve coordination and increase proprioception  to improve the patients ability to perform daily activities with decreased pain and symptom levels       20 min Neuromuscular Re-education:  [x]  See flow sheet :   Rationale: increase ROM, increase strength, improve coordination, improve balance and increase proprioception  to improve the patients ability to perform daily activities with decreased pain and symptom levels            With   [] TE   [] TA   [] neuro   [] other: Patient Education: [x] Review HEP    [] Progressed/Changed HEP based on:   [] positioning   [] body mechanics   [] transfers   [] heat/ice application    [] other:      Other Objective/Functional Measures:   Hip Add Drop Test: +left LE     Pain Level (0-10 scale) post treatment: 0    ASSESSMENT/Changes in Function:   Pt tolerated session well today with no increased knee pain with exercises.  Required cues and demonstration for 90/90, glut max, and adductor pull back for proper form to facilitate gluts with use of TB and bolster. Pt instructed to shift weight more posteriorly to activate glutes and HS with retro stairs and SL RDL. Pt was able to bilaterally get to midline for hip ADD test, but unable to drop past midline on the left LE due to restriction in hip mobility. Patient will conttinue to benefit from skilled PT services to modify and progress therapeutic interventions, address functional mobility deficits, address ROM deficits, address strength deficits, analyze and address soft tissue restrictions, analyze and cue movement patterns, analyze and modify body mechanics/ergonomics, assess and modify postural abnormalities and instruct in home and community integration to attain remaining goals. []  See Plan of Care  []  See progress note/recertification  []  See Discharge Summary         Progress towards goals / Updated goals:  Short Term Goals: STG- To be accomplished in 5 visit(s):  1.  Pt will be independent with HEP to encourage prophylaxis. Eval:dispensed   Current: Progressing 12/7/20 reports compliance updated this session      Long Term Goals: LTG- To be accomplished in 14 visit(s):  1.  Pt will be able to descend > 2 flights stairs without pain to improve mobility around office and home. Eval:pain with descending      2.  Pt will improve hip adduction drop to negative bilaterally with HEP to indicate femoral-acetabular mobility needed for gait. Eval:+ bilaterally, unable to assume test position on the left  Currentt: bilaterally can assume test position, +left LE progressing 12/9     3.  Pt will improve LORA squat to 4/5 to indicate mobility and strength needed recreational activities  Eval:LORA Squat: 2/5, pronounced valgus on right           4.  Pt FOTO score will increase by 8 points to show improvement in  function.   Eval:65 (risk adjusted score 51)  Current: will address at visit 5    PLAN  [] Upgrade activities as tolerated     [x]  Continue plan of care  []  Update interventions per flow sheet       []  Discharge due to:_  []  Other:_      [place] 12/9/2020  9:47 AM    Future Appointments   Date Time Provider [place] Tsai   12/14/2020  3:15 PM Karol Rowland PT, DPT [facility]   12/16/2020  1:45 PM Karol Rowland PT, DARRICK LENNON [facility]   12/29/2020  5:30 PM Tessie Bean [facility]   12/30/2020  1:15 PM Tessie Bean [facility]

## 2020-12-14 ENCOUNTER — HOSPITAL ENCOUNTER (OUTPATIENT)
Dept: PHYSICAL THERAPY | Age: 50
Discharge: HOME | End: 2020-12-14
Payer: OTHER GOVERNMENT

## 2020-12-14 PROCEDURE — 97112 NEUROMUSCULAR REEDUCATION: CPT

## 2020-12-14 PROCEDURE — 97110 THERAPEUTIC EXERCISES: CPT

## 2020-12-14 PROCEDURE — 97530 THERAPEUTIC ACTIVITIES: CPT

## 2020-12-14 NOTE — PROGRESS NOTES
PT DAILY TREATMENT NOTE    Patient Name: Yanira Tyler  Date:2020  : 1970  [x]  Patient  Verified  Payor:  / Plan: Lex Ordoñez 74 / Product Type:  /    In time:3:05 pm  Out time:4:05 pm   Total Treatment Time (min): 60  Total Timed Codes (min): 60  Visit #: 5 of 14    Treatment Area: Right knee pain [M25.561]    SUBJECTIVE  Pain Level (0-10 scale): 0  Any medication changes, allergies to medications, adverse drug reactions, diagnosis change, or new procedure performed?: [x] No    [] Yes (see summary sheet for update)  Subjective functional status/changes:   [] No changes reported  \"Good. The weekend went well. \"    OBJECTIVE      20 min Therapeutic Exercise:  [x] See flow sheet :   Rationale: increase ROM, increase strength, improve coordination and increase proprioception to improve the patients ability to perform daily activities with decreased pain and symptom levels      15 min Therapeutic Activity:  [x]  See flow sheet :   Rationale: increase ROM, increase strength, improve coordination, improve balance and increase proprioception  to improve the patients ability to perform daily activities with decreased pain and symptom levels       25 min Neuromuscular Re-education:  [x]  See flow sheet :   Rationale: increase ROM, increase strength, improve coordination and increase proprioception  to improve the patients ability to perform daily activities with decreased pain and symptom levels            With   [] TE   [] TA   [] neuro   [] other: Patient Education: [x] Review HEP    [] Progressed/Changed HEP based on:   [] positioning   [] body mechanics   [] transfers   [] heat/ice application    [] other:      Other Objective/Functional Measures:   LORA Special Tests (pre/post)  HGIR:                                                 Right: 70/78             Left: 80/80  Hip Add Drop Test:                           Right: +/midline           Left:+/+ w/ drop  Trunk Rot Right: 15.5 in/14 in  Left 15.5 in /14 in  Shoulder Horizontal Abduction          Right: 35 /NT             Left: 28 /NT          Pain Level (0-10 scale) post treatment: 0    ASSESSMENT/Changes in Function:   Pt demonstrated good carryover with adductor pullback requiring fewer props and cues. Challenged with lunge to cross connect and Split Stance RDL with slider. Improved ability to recruit hamstrings with facilitation of left obliques. Patient will continue to benefit from skilled PT services to modify and progress therapeutic interventions, address functional mobility deficits, address ROM deficits, address strength deficits, analyze and address soft tissue restrictions, analyze and cue movement patterns, analyze and modify body mechanics/ergonomics, assess and modify postural abnormalities and instruct in home and community integration to attain remaining goals. []  See Plan of Care  []  See progress note/recertification  []  See Discharge Summary         Progress towards goals / Updated goals:  Short Term Goals: STG- To be accomplished in 5 visit(s):  1.  Pt will be independent with HEP to encourage prophylaxis. Eval:dispensed   Current: Progressing 12/14/20 reports compliance       Long Term Goals: LTG- To be accomplished in 14 visit(s):  1.  Pt will be able to descend > 2 flights stairs without pain to improve mobility around office and home. Eval:pain with descending      2.  Pt will improve hip adduction drop to negative bilaterally with HEP to indicate femoral-acetabular mobility needed for gait.   Eval:+ bilaterally, unable to assume test position on the left  Currentt: progressing 12/14/20 ended session with Right clearing and left Positive with drop      3.  Pt will improve LORA squat to 4/5 to indicate mobility and strength needed recreational activities  Eval:LORA Squat: 2/5, pronounced valgus on right           4.  Pt FOTO score will increase by 8 points to show improvement in  function.   Eval:65 (risk adjusted score 51)  Current: will address at visit 6, tablet in use at visit 5    PLAN  []  Upgrade activities as tolerated     []  Continue plan of care  []  Update interventions per flow sheet       []  Discharge due to:_  []  Other:_      Burgess Bindu PT, DPT 12/14/2020  3:11 PM    Future Appointments   Date Time Provider [place] Tsai   12/14/2020  3:15 PM Netta Canavan, [facility]   12/16/2020  1:45 PM Netta Canavan, PT, DPT [facility]   12/29/2020  5:30 PM RemayalacTaina Medici [facility]   12/30/2020  1:15 PM Remesic, Taina Medici [facility]

## 2020-12-16 ENCOUNTER — HOSPITAL ENCOUNTER (OUTPATIENT)
Dept: PHYSICAL THERAPY | Age: 50
Discharge: HOME | End: 2020-12-16
Payer: OTHER GOVERNMENT

## 2020-12-16 PROCEDURE — 97112 NEUROMUSCULAR REEDUCATION: CPT

## 2020-12-16 PROCEDURE — 97110 THERAPEUTIC EXERCISES: CPT

## 2020-12-16 PROCEDURE — 97530 THERAPEUTIC ACTIVITIES: CPT

## 2020-12-16 NOTE — PROGRESS NOTES
PT DAILY TREATMENT NOTE    Patient Name: Kailey Klein  Date:2020  : 1970  [x]  Patient  Verified  Payor:  / Plan: Yang Mejia / Product Type:  /    In time:1:38 pm  Out time:2:40 pm  Total Treatment Time (min): 62  Total Timed Codes (min): 62  Visit #: 6 of 14    Treatment Area: Right knee pain [M25.561]    SUBJECTIVE  Pain Level (0-10 scale): 0  Any medication changes, allergies to medications, adverse drug reactions, diagnosis change, or new procedure performed?: [x] No    [] Yes (see summary sheet for update)  Subjective functional status/changes:   [] No changes reported  \"I am good. \"    OBJECTIVE      24 min Therapeutic Exercise:  [x] See flow sheet :   Rationale: increase ROM, increase strength, improve coordination and increase proprioception to improve the patients ability to perform daily activities with decreased pain and symptom levels      13 min Therapeutic Activity:  [x]  See flow sheet :   Rationale: increase ROM, increase strength, improve coordination and increase proprioception  to improve the patients ability to perform daily activities with decreased pain and symptom levels       20 min Neuromuscular Re-education:  [x]  See flow sheet :   Rationale: increase ROM, increase strength, improve coordination, improve balance and increase proprioception  to improve the patients ability to perform daily activities with decreased pain and symptom levels      5 min Manual Therapy:  Manual stretching of left posterior hip capsule in Right S/L   Rationale: decrease pain, increase ROM, increase tissue extensibility and increase postural awareness to improve the patients ability to perform daily activities with decreased pain and symptom levels    The manual therapy interventions were performed at a separate and distinct time from the therapeutic activities interventions.          With   [] TE   [] TA   [] neuro   [] other: Patient Education: [x] Review HEP [] Progressed/Changed HEP based on:   [] positioning   [] body mechanics   [] transfers   [] heat/ice application    [] other:      Other Objective/Functional Measures:   LORA Special Tests (pre/post)  HGIR:                                                 Right: 69/75             Left: 75/80  Hip Add Drop Test:                           Right: midine/-          Left:+/+ with drop  Trunk Rot                                           Right: 16 in/14 in  Left 16 in  /14 in       Pain Level (0-10 scale) post treatment: 0    ASSESSMENT/Changes in Function:   Pt is demonstrating good progress with PT and compliance with HEP. is responding well to left posterior hip capsule inhibition. Requiring fewer cues and props for adductor pull back. Challenged with eccentric control and positioning, appears to rely on gross, large movements. Patient will continue to benefit from skilled PT services to modify and progress therapeutic interventions, address functional mobility deficits, address ROM deficits, address strength deficits, analyze and address soft tissue restrictions, analyze and cue movement patterns, analyze and modify body mechanics/ergonomics, assess and modify postural abnormalities and instruct in home and community integration to attain remaining goals. []  See Plan of Care  []  See progress note/recertification  []  See Discharge Summary         Progress towards goals / Updated goals:  Short Term Goals: STG- To be accomplished in 5 visit(s):  1.  Pt will be independent with HEP to encourage prophylaxis. Eval:dispensed   Current: Progressing 12/14/20 reports compliance       Long Term Goals: LTG- To be accomplished in 14 visit(s):  1.  Pt will be able to descend > 2 flights stairs without pain to improve mobility around office and home. Eval:pain with descending      2.  Pt will improve hip adduction drop to negative bilaterally with HEP to indicate femoral-acetabular mobility needed for gait.   Eval:+ bilaterally, unable to assume test position on the left  Currentt: progressing 12/14/20 ended session with Right clearing and left Positive with drop      3.  Pt will improve LORA squat to 4/5 to indicate mobility and strength needed recreational activities  Eval:LORA Squat: 2/5, pronounced valgus on right   Current: Progressing 12/16/20 improved valgus with heel downs           4.  Pt FOTO score will increase by 8 points to show improvement in  function.   Eval:65 (risk adjusted score 51)  Current: Progressing 12/16/20 67    PLAN  [x]  Upgrade activities as tolerated     []  Continue plan of care  []  Update interventions per flow sheet       []  Discharge due to:_  []  Other:_      Josie Hayes, PT, DPT 12/16/2020  1:56 PM    Future Appointments   Date Time Provider [place] Tsai   12/29/2020  5:30 PM Juliet Baen [facility]   12/30/2020  1:15 PM Juliet Bean [facility]

## 2020-12-29 ENCOUNTER — APPOINTMENT (OUTPATIENT)
Dept: PHYSICAL THERAPY | Age: 50
End: 2020-12-29
Payer: OTHER GOVERNMENT

## 2020-12-30 ENCOUNTER — APPOINTMENT (OUTPATIENT)
Dept: PHYSICAL THERAPY | Age: 50
End: 2020-12-30
Payer: OTHER GOVERNMENT

## 2021-01-12 ENCOUNTER — HOSPITAL ENCOUNTER (OUTPATIENT)
Dept: PHYSICAL THERAPY | Age: 51
Discharge: HOME | End: 2021-01-12
Payer: OTHER GOVERNMENT

## 2021-01-12 PROCEDURE — 97110 THERAPEUTIC EXERCISES: CPT

## 2021-01-12 PROCEDURE — 97530 THERAPEUTIC ACTIVITIES: CPT

## 2021-01-12 NOTE — PROGRESS NOTES
PT DAILY TREATMENT NOTE    Patient Name: Astrid Eduardo  Date:2021  : 1970  [x]  Patient  Verified  Payor:  / Plan: Lex Ordoñez 74 / Product Type:  /    In time:2:38  Out time:3:40  Total Treatment Time (min): 62  Total Timed Codes (min): 62  1:1 Treatment Time ( [address] only): 62   Visit #: 7 of 14    Treatment Area: Right knee pain [M25.561]    SUBJECTIVE  Pain Level (0-10 scale): 0  Any medication changes, allergies to medications, adverse drug reactions, diagnosis change, or new procedure performed?: [x] No    [] Yes (see summary sheet for update)  Subjective functional status/changes:   [] No changes reported  \"Sorry I was out I was sick. I wasn't doing the exercises but started this week. \"    OBJECTIVE      47 min Therapeutic Exercise:  [x] See flow sheet :   Rationale: increase ROM and increase strength to improve the patients ability to perform daily activities with decreased pain and symptom levels    15 min Therapeutic Activity:  [x]  See flow sheet :   Rationale: increase strength, improve coordination and increase proprioception  to improve the patients ability to perform daily activities with decreased pain and symptom levels       With   [] TE   [] TA   [] neuro   [] other: Patient Education: [x] Review HEP    [] Progressed/Changed HEP based on:   [] positioning   [] body mechanics   [] transfers   [] heat/ice application    [] other:      Other Objective/Functional Measures:   LORA Special Tests (pre/post)  HGIR:                                                 Right: 40*/             Left: 65*/  Shoulder Flexion   Right: 155*/             Left: 145*/  Hip Add Drop Test:                           Right: +/+ (drop past midline     Left:+/+(drop past midline)  Trunk Rot                                           Right: 16.5in/    Left 16in /  Shoulder Horizontal Abduction          Right: - /             Left: - /    Pain Level (0-10 scale) post treatment: 0    ASSESSMENT/Changes in Function: Pt presented to therapy with C/C of acute onset lateral right knee pain in October 2020. Pt has only attended 7 sessions due to illness. Pt reporting no significant change in symptoms since starting therapy with max pain still 2/10 occasionally with walking and stairs however overall mobility is improving with ability to complete DL squat to parallel today without pain or right knee valgus and able to drop past midline with hip adduction bilaterally. Pt continues to be challenged with SL activities with activating gluts and knee valgus noted especially with trialing SL squat. Pt would benefit from continued skilled PT services to address remaining unmet goals to allow pt to walk and manage steps without right knee pain.     Patient will continue to benefit from skilled PT services to modify and progress therapeutic interventions, address functional mobility deficits, address ROM deficits, address strength deficits, analyze and address soft tissue restrictions, analyze and cue movement patterns, analyze and modify body mechanics/ergonomics, assess and modify postural abnormalities and instruct in home and community integration to attain remaining goals.     []  See Plan of Care  []  See progress note/recertification  []  See Discharge Summary         Progress towards goals / Updated goals:  Short Term Goals: STG- To be accomplished in 5 visit(s):  1.  Pt will be independent with HEP to encourage prophylaxis.  Eval:dispensed   Current: compliance per pt report goal MET     Long Term Goals: LTG- To be accomplished in 14 visit(s):  1.  Pt will be able to descend > 2 flights stairs without pain to improve mobility around office and home.  Eval:pain with descending   Current: 2/10 max pain with walking, stairs however able to complete eccentric taps downs in clinic without pain or right knee valgus progressing      2.  Pt will improve hip adduction drop to negative bilaterally with HEP  to indicate femoral-acetabular mobility needed for gait. Eval:+ bilaterally, unable to assume test position on the left  Current: + bilaterally however able to drop past midline some bilaterally progressing      3.  Pt will improve LORA squat to 4/5 to indicate mobility and strength needed recreational activities  Eval:LORA Squat: 2/5, pronounced valgus on right   Current: able to complete squat to parallel without valgus or lumbar extension goal MET      4.  Pt FOTO score will increase by 8 points to show improvement in  function. Eval:65 (risk adjusted score 51)  Current:  67 progressing     Updated goal 1/12/21  1.  Pt will be able to complete SL squat to above parallel with good form and no pain to demonstrate improved functional glut strength  Current: right knee valgus and LOB with SL squat to table     PLAN  []  Upgrade activities as tolerated     [x]  Continue plan of care  []  Update interventions per flow sheet       []  Discharge due to:_  []  Other:_      Muarice Bean 1/12/2021  2:35 PM    Future Appointments   Date Time Provider [place] Tsai   1/12/2021  2:45 PM Maurice Bean [facility]   1/15/2021 10:15 AM Alejandrina Grijalva, PT, DPT [facility]   1/18/2021  1:00 PM Alejandrina Grijalva PT, DPT [facility]   1/20/2021  8:45 AM Maurice Bean [facility]   1/25/2021 11:00 AM Maurice Bean [facility]   1/28/2021 11:45 AM Alejandrina Grijalva, PT, DPT [facility]

## 2021-01-12 NOTE — PROGRESS NOTES
[facility] at the [address], [place], [address] [facility]  Phone: [phone]      Fax:  [phone]    Progress Note  Patient name: Abi Nath Start of Care: 2020   Referral source: Eden Jamison MD : 1970               Medical Diagnosis: Right knee pain [M25.561]    Onset Date:2020               Treatment Diagnosis: Right Knee Pain    Prior Hospitalization: see medical history Provider#: [id number]   Medications: Verified on Patient summary List    Comorbidities: previous knee surgeries, Hx of LBP   Prior Level of Function: Increase in pain with running, descending stairs   Reported intermittent catching and locking with getting in/out of the car    Visits from Start of Care: 7    Missed Visits: 1    Progress Towards Goals:   Short Term Goals: STG- To be accomplished in 5 visit(s):  1.  Pt will be independent with HEP to encourage prophylaxis. Eval:dispensed   Current: compliance per pt report goal MET     Long Term Goals: LTG- To be accomplished in 14 visit(s):  1.  Pt will be able to descend > 2 flights stairs without pain to improve mobility around office and home. Eval:pain with descending   Current: 2/10 max pain with walking, stairs however able to complete eccentric taps downs in clinic without pain or right knee valgus progressing      2.  Pt will improve hip adduction drop to negative bilaterally with HEP to indicate femoral-acetabular mobility needed for gait.   Eval:+ bilaterally, unable to assume test position on the left  Current: + bilaterally however able to drop past midline some bilaterally progressing      3.  Pt will improve LORA squat to 4/5 to indicate mobility and strength needed recreational activities  Eval:LORA Squat: 2/5, pronounced valgus on right   Current: able to complete squat to parallel without valgus or lumbar extension goal MET      4.  Pt FOTO score will increase by 8 points to show improvement in  function. Eval:65 (risk adjusted score 51)  Current:  67 progressing      Updated goal 1/12/21  1. Pt will be able to complete SL squat to above parallel with good form and no pain to demonstrate improved functional glut strength  Current: right knee valgus and LOB with SL squat to table        Key Functional Changes: Pt presented to therapy with C/C of acute onset lateral right knee pain in October 2020. Pt has only attended 7 sessions due to illness. Pt reporting no significant change in symptoms since starting therapy with max pain still 2/10 occasionally with walking and stairs however overall mobility is improving with ability to complete DL squat to parallel today without pain or right knee valgus and able to drop past midline with hip adduction bilaterally. Pt continues to be challenged with SL activities with activating gluts and knee valgus noted especially with trialing SL squat. Pt would benefit from continued skilled PT services to address remaining unmet goals to allow pt to walk and manage steps without right knee pain.      Updated Goals: to be achieved in 7 treatments:   See goals above    ASSESSMENT/RECOMMENDATIONS:  [x]Continue therapy per initial plan/protocol at a frequency of  2 x per week for 4 weeks  []Continue therapy with the following recommended changes:_____________________      _____________________________________________________________________  []Discontinue therapy progressing towards or have reached established goals  []Discontinue therapy due to lack of appreciable progress towards goals  []Discontinue therapy due to lack of attendance or compliance  []Await Physician's recommendations/decisions regarding therapy  []Other:________________________________________________________________    Thank you for this referral.   Akash Bean 1/12/2021 4:03 PM

## 2021-01-15 ENCOUNTER — HOSPITAL ENCOUNTER (OUTPATIENT)
Dept: PHYSICAL THERAPY | Age: 51
Discharge: HOME | End: 2021-01-15
Payer: OTHER GOVERNMENT

## 2021-01-15 PROCEDURE — 97530 THERAPEUTIC ACTIVITIES: CPT

## 2021-01-15 PROCEDURE — 97112 NEUROMUSCULAR REEDUCATION: CPT

## 2021-01-15 PROCEDURE — 97110 THERAPEUTIC EXERCISES: CPT

## 2021-01-15 NOTE — PROGRESS NOTES
PT DAILY TREATMENT NOTE    Patient Name: Jamari Bui  Date:1/15/2021  : 1970  [x]  Patient  Verified  Payor:  / Plan: Lex Ordoñez 74 / Product Type:  /    In time:10:15 am  Out time:11:15 am  Total Treatment Time (min): 60  Total Timed Codes (min): 60  Visit #: 8 of 14    Treatment Area: Right knee pain [M25.561]    SUBJECTIVE  Pain Level (0-10 scale): 0  Any medication changes, allergies to medications, adverse drug reactions, diagnosis change, or new procedure performed?: [x] No    [] Yes (see summary sheet for update)  Subjective functional status/changes:   [] No changes reported  \"My knee is great. I slept funny and my shoulders hurt. Both of them. \"    OBJECTIVE      15 min Therapeutic Exercise:  [x] See flow sheet :   Rationale: increase ROM, increase strength, improve coordination and increase proprioception to improve the patients ability to perform daily activities with decreased pain and symptom levels      15 min Therapeutic Activity:  [x]  See flow sheet :   Rationale: increase ROM, increase strength, improve coordination and increase proprioception  to improve the patients ability to perform daily activities with decreased pain and symptom levels       30 min Neuromuscular Re-education:  [x]  See flow sheet :   Rationale: increase ROM, increase strength, improve coordination and increase proprioception  to improve the patients ability to perform daily activities with decreased pain and symptom levels          With   [] TE   [] TA   [] neuro   [] other: Patient Education: [x] Review HEP    [] Progressed/Changed HEP based on:   [] positioning   [] body mechanics   [] transfers   [] heat/ice application    [] other:      Other Objective/Functional Measures:   LORA Special Tests (pre/post)  HGIR:                                                 Right: 40/65             Left: 45/65  Hip Add Drop Test:                           Right: + w/ drop/midline     Left:+/+ w/ drop  Trunk Rot                                           Right: 14 in/13 in  Left 14 in  /13 in  Shoulder Horizontal Abduction          Right: 20 /NT             Left: 20 /NT      Pain Level (0-10 scale) post treatment: 0    ASSESSMENT/Changes in Function:   Pt required max cues with Heel Downs - utilized band at knee for hamstring facilitation. Responded well to right hip hike for right glut max and left knee flexion for medial hamstring. Continues to be challenged with controlling valgus with SL activities. Patient will continue to benefit from skilled PT services to modify and progress therapeutic interventions, address functional mobility deficits, address ROM deficits, address strength deficits, analyze and address soft tissue restrictions, analyze and cue movement patterns, analyze and modify body mechanics/ergonomics, assess and modify postural abnormalities and instruct in home and community integration to attain remaining goals. []  See Plan of Care  []  See progress note/recertification  []  See Discharge Summary         Progress towards goals / Updated goals:  Short Term Goals: STG- To be accomplished in 5 visit(s):  1.  Pt will be independent with HEP to encourage prophylaxis. Eval:dispensed   Last PN: compliance per pt report goal MET     Long Term Goals: LTG- To be accomplished in 14 visit(s):  1.  Pt will be able to descend > 2 flights stairs without pain to improve mobility around office and home. Eval:pain with descending   Last PN: 2/10 max pain with walking, stairs however able to complete eccentric taps downs in clinic without pain or right knee valgus progressing      2.  Pt will improve hip adduction drop to negative bilaterally with HEP to indicate femoral-acetabular mobility needed for gait.   Eval:+ bilaterally, unable to assume test position on the left  Last PN: + bilaterally however able to drop past midline some bilaterally progressing      3.  Pt will improve LORA squat to 4/5 to indicate mobility and strength needed recreational activities  Eval:LORA Squat: 2/5, pronounced valgus on right   Last PN: able to complete squat to parallel without valgus or lumbar extension goal MET      4.  Pt FOTO score will increase by 8 points to show improvement in  function. Eval:65 (risk adjusted score 51)  Last PN:  67 progressing      Updated goal 1/12/21  1.  Pt will be able to complete SL squat to above parallel with good form and no pain to demonstrate improved functional glut strength  Last PN: right knee valgus and LOB with SL squat to table   Current: Progressing 1/15/21 continues to be challenged with controlling valgus with SL activities     PLAN  []  Upgrade activities as tolerated     []  Continue plan of care  []  Update interventions per flow sheet       []  Discharge due to:_  []  Other:_      Jay Gilbert, PT, DPT 1/15/2021  10:32 AM    Future Appointments   Date Time Provider Tracy Tsai   1/18/2021  1:00 PM Ashley Salinas PT, DPT [facility]   1/20/2021  8:45 AM Antonio Bean [facility]   1/25/2021 11:00 AM Antonio Bean [facility]   1/27/2021  1:00 PM Ashley Salinas PT, DPT [facility]

## 2021-01-18 ENCOUNTER — APPOINTMENT (OUTPATIENT)
Dept: PHYSICAL THERAPY | Age: 51
End: 2021-01-18
Payer: OTHER GOVERNMENT

## 2021-01-20 ENCOUNTER — HOSPITAL ENCOUNTER (OUTPATIENT)
Dept: PHYSICAL THERAPY | Age: 51
Discharge: HOME | End: 2021-01-20
Payer: OTHER GOVERNMENT

## 2021-01-20 PROCEDURE — 97140 MANUAL THERAPY 1/> REGIONS: CPT

## 2021-01-20 PROCEDURE — 97110 THERAPEUTIC EXERCISES: CPT

## 2021-01-20 PROCEDURE — 97530 THERAPEUTIC ACTIVITIES: CPT

## 2021-01-20 PROCEDURE — 97112 NEUROMUSCULAR REEDUCATION: CPT

## 2021-01-20 NOTE — PROGRESS NOTES
PT DAILY TREATMENT NOTE    Patient Name: Lilibeth Fuelling  Date:2021  : 1970  [x]  Patient  Verified  Payor: ANTONY / Plan: Lex Ordoñez 74 / Product Type: Annie Souza /    In time:8:45  Out time:9::40  Total Treatment Time (min): 55  Total Timed Codes (min): 55  1:1 Treatment Time ( [address] only): 54   Visit #: 9 of 14    Treatment Area: Right knee pain [M25.561]    SUBJECTIVE  Pain Level (0-10 scale): 0  Any medication changes, allergies to medications, adverse drug reactions, diagnosis change, or new procedure performed?: [x] No    [] Yes (see summary sheet for update)  Subjective functional status/changes:   [] No changes reported  \"I feel very stiff (pointing to trunk)\"    OBJECTIVE    18 min Therapeutic Exercise:  [] See flow sheet :   Rationale: increase ROM and increase strength to improve the patients ability to perform daily activities with decreased pain and symptom levels    15 min Therapeutic Activity:  [x]  See flow sheet :   Rationale: increase strength, improve coordination and increase proprioception  to improve the patients ability to perform daily activities with decreased pain and symptom levels    10 min Neuromuscular Re-education:  [x]?   See flow sheet :   Rationale: increase ROM, increase strength, improve coordination and increase proprioception  to improve the patients ability to perform daily activities with decreased pain and symptom levels      12 min Manual Therapy:  LORA AIC correction, Sternal mobilization with active pelvic tilt, Superior T4 (LORA technique), Right subclavius release (LORA technique), LORA infraclavicular rib pump with active breath   Obtained consent for hand placement    Rationale: decrease pain, increase ROM and increase tissue extensibility to improve the patients ability to perform daily activities with decreased pain and symptom levels  The manual therapy interventions were performed at a separate and distinct time from the therapeutic activities interventions. With   [] TE   [] TA   [] neuro   [] other: Patient Education: [x] Review HEP    [] Progressed/Changed HEP based on:   [] positioning   [] body mechanics   [] transfers   [] heat/ice application    [] other:      Other Objective/Functional Measures:   Poor control with descent with SL squat, left knee valgus  Cues to decrease lateral shift with 3 way lunge     Pain Level (0-10 scale) post treatment: 0    ASSESSMENT/Changes in Function: Pt tolerated session well with ability to complete exercises without knee pain with proper form. Fatigued with RFE lunges today left > right and still challenged with controlling lateral weight shift and valgus with SL activities. Patient will continue to benefit from skilled PT services to modify and progress therapeutic interventions, address functional mobility deficits, address ROM deficits, address strength deficits, analyze and cue movement patterns, analyze and modify body mechanics/ergonomics, assess and modify postural abnormalities and instruct in home and community integration to attain remaining goals. []  See Plan of Care  []  See progress note/recertification  []  See Discharge Summary         Progress towards goals / Updated goals:  Short Term Goals: STG- To be accomplished in 5 visit(s):  1.  Pt will be independent with HEP to encourage prophylaxis. Eval:dispensed   Last PN: compliance per pt report goal MET     Long Term Goals: LTG- To be accomplished in 14 visit(s):  1.  Pt will be able to descend > 2 flights stairs without pain to improve mobility around office and home. Eval:pain with descending   Last PN: 2/10 max pain with walking, stairs however able to complete eccentric taps downs in clinic without pain or right knee valgus progressing      2.  Pt will improve hip adduction drop to negative bilaterally with HEP to indicate femoral-acetabular mobility needed for gait.   Eval:+ bilaterally, unable to assume test position on the left  Last PN: + bilaterally however able to drop past midline some bilaterally progressing      3.  Pt will improve LORA squat to 4/5 to indicate mobility and strength needed recreational activities  Eval:LORA Squat: 2/5, pronounced valgus on right   Last PN: able to complete squat to parallel without valgus or lumbar extension goal MET      4.  Pt FOTO score will increase by 8 points to show improvement in  function. Eval:65 (risk adjusted score 51)  Last PN:  67 progressing      Updated goal 1/12/21  1.  Pt will be able to complete SL squat to above parallel with good form and no pain to demonstrate improved functional glut strength  Last PN: right knee valgus and LOB with SL squat to table   Current: valgus with left LE, poor control with decent bilaterally 1/20       PLAN  []  Upgrade activities as tolerated     [x]  Continue plan of care  []  Update interventions per flow sheet       []  Discharge due to:_  []  Other:_      Tiffany Bean 1/20/2021  10:30 AM    Future Appointments   Date Time Provider Tracy Tsai   1/25/2021 11:00 AM Tiffany Bean [facility]   1/27/2021  1:00 PM Alphonso Benitez, PT, DPT [facility]

## 2021-01-22 ENCOUNTER — HOSPITAL ENCOUNTER (OUTPATIENT)
Dept: PHYSICAL THERAPY | Age: 51
Discharge: HOME | End: 2021-01-22
Payer: OTHER GOVERNMENT

## 2021-01-22 PROCEDURE — 97110 THERAPEUTIC EXERCISES: CPT

## 2021-01-22 PROCEDURE — 97112 NEUROMUSCULAR REEDUCATION: CPT

## 2021-01-22 PROCEDURE — 97530 THERAPEUTIC ACTIVITIES: CPT

## 2021-01-22 NOTE — PROGRESS NOTES
PT DAILY TREATMENT NOTE    Patient Name: Alba Holter  Date:2021  : 1970  [x]  Patient  Verified  Payor:  / Plan: Lex Ordoñez 74 / Product Type:  /    In time:10:50  Out time:11:52  Total Treatment Time (min): 62  Total Timed Codes (min): 62  1:1 Treatment Time ([facility] only): 62   Visit #: 10 of 14    Treatment Area: Right knee pain [M25.561]    SUBJECTIVE  Pain Level (0-10 scale): 0  Any medication changes, allergies to medications, adverse drug reactions, diagnosis change, or new procedure performed?: [x] No    [] Yes (see summary sheet for update)  Subjective functional status/changes:   [] No changes reported  \"Good. I feel less stiff today. \"    OBJECTIVE      27 min Therapeutic Exercise:  [x] See flow sheet :   Rationale: increase ROM and increase strength to improve the patients ability to perform daily activities with decreased pain and symptom levels    15 min Therapeutic Activity:  [x]  See flow sheet :   Rationale: increase strength, improve coordination and increase proprioception  to improve the patients ability to perform daily activities with decreased pain and symptom levels     15 min Neuromuscular Re-education:  [x]  See flow sheet :   Rationale: improve coordination, improve balance and increase proprioception  to improve the patients ability to perform daily activities with decreased pain and symptom levels    5 min Manual Therapy:  Manual left posterior hip capsule stretch in right s/l and left s/l with lady in glasses    Rationale: decrease pain, increase ROM and increase tissue extensibility to improve the patients ability to perform daily activities with decreased pain and symptom levels  The manual therapy interventions were performed at a separate and distinct time from the therapeutic activities interventions.     With   [] TE   [] TA   [] neuro   [] other: Patient Education: [x] Review HEP    [] Progressed/Changed HEP based on:   [] positioning [] body mechanics   [] transfers   [] heat/ice application    [] other:      Other Objective/Functional Measures:   Improved control with descent with SL squat  Unable to complete SL bridge HS curl on right  FOTO 75     Pain Level (0-10 scale) post treatment: 0    ASSESSMENT/Changes in Function: Pt tolerated session well with no knee pain at end of session. Anterior knee pain at times during exercises however able to decrease with shifting weight more posteriorly onto heel. Decreased height with SB HS curl bridges with inability to complete SL on right without trunk compensation. Good facilitation of gluts with right glut max and stand SL activities with hip IR/ER today. Patient will continue to benefit from skilled PT services to modify and progress therapeutic interventions, address functional mobility deficits, address ROM deficits, address strength deficits, analyze and address soft tissue restrictions, analyze and cue movement patterns, analyze and modify body mechanics/ergonomics, assess and modify postural abnormalities and instruct in home and community integration to attain remaining goals. []  See Plan of Care  []  See progress note/recertification  []  See Discharge Summary         Progress towards goals / Updated goals:  Short Term Goals: STG- To be accomplished in 5 visit(s):  1.  Pt will be independent with HEP to encourage prophylaxis. Eval:dispensed   Last PN: compliance per pt report goal MET     Long Term Goals: LTG- To be accomplished in 14 visit(s):  1.  Pt will be able to descend > 2 flights stairs without pain to improve mobility around office and home. Eval:pain with descending   Last PN: 2/10 max pain with walking, stairs however able to complete eccentric taps downs in clinic without pain or right knee valgus progressing      2.  Pt will improve hip adduction drop to negative bilaterally with HEP to indicate femoral-acetabular mobility needed for gait.   Eval:+ bilaterally, unable to assume test position on the left  Last PN: + bilaterally however able to drop past midline some bilaterally progressing      3.  Pt will improve LORA squat to 4/5 to indicate mobility and strength needed recreational activities  Eval:LORA Squat: 2/5, pronounced valgus on right   Last PN: able to complete squat to parallel without valgus or lumbar extension goal MET      4.  Pt FOTO score will increase by 8 points to show improvement in  function. Eval:65 (risk adjusted score 51)  Last PN:  67  Current: 75 goal MET 1/22     Updated goal 1/12/21  1.  Pt will be able to complete SL squat to above parallel with good form and no pain to demonstrate improved functional glut strength  Last PN: right knee valgus and LOB with SL squat to table   Current: valgus with left LE, poor control with decent bilaterally 1/20    PLAN  []  Upgrade activities as tolerated     [x]  Continue plan of care  []  Update interventions per flow sheet       []  Discharge due to:_  []  Other:_      Tianna Bean 1/22/2021  11:10 AM    Future Appointments   Date Time Provider Tracy Tsai   1/25/2021 11:00 AM Tianna Bean [facility]   1/27/2021  1:00 PM Susan Ferraro PT, DPT [facility]

## 2021-01-25 ENCOUNTER — HOSPITAL ENCOUNTER (OUTPATIENT)
Dept: PHYSICAL THERAPY | Age: 51
Discharge: HOME | End: 2021-01-25
Payer: OTHER GOVERNMENT

## 2021-01-25 PROCEDURE — 97110 THERAPEUTIC EXERCISES: CPT

## 2021-01-25 PROCEDURE — 97530 THERAPEUTIC ACTIVITIES: CPT

## 2021-01-25 PROCEDURE — 97112 NEUROMUSCULAR REEDUCATION: CPT

## 2021-01-25 NOTE — PROGRESS NOTES
PT DAILY TREATMENT NOTE    Patient Name: Carla Collazo  Date:2021  : 1970  [x]  Patient  Verified  Payor: ANTONY / Plan: Lex Ordoñez 74 / Product Type:  /    In time:10:55  Out time:11:50  Total Treatment Time (min): 55  Total Timed Codes (min): 55  1:1 Treatment Time ( [address] only): 54   Visit #: 11 of 14    Treatment Area: Right knee pain [M25.561]    SUBJECTIVE  Pain Level (0-10 scale): 0  Any medication changes, allergies to medications, adverse drug reactions, diagnosis change, or new procedure performed?: [x] No    [] Yes (see summary sheet for update)  Subjective functional status/changes:   [] No changes reported  \"good. \"    OBJECTIVE    25 min Therapeutic Exercise:  [x] See flow sheet :   Rationale: increase ROM and increase strength to improve the patients ability to perform daily activities with decreased pain and symptom levels    10 min Therapeutic Activity:  [x]  See flow sheet :   Rationale: increase strength, improve coordination and increase proprioception  to improve the patients ability to perform daily activities with decreased pain and symptom levels     15 min Neuromuscular Re-education:  []  See flow sheet :   Rationale: improve coordination, improve balance and increase proprioception  to improve the patients ability to perform daily activities with decreased pain and symptom levels    5 min Manual Therapy:  Manual left posterior hip capsule stretch in right s/l, manual right QL stretch with lady in glassess   Rationale: decrease pain, increase ROM and increase tissue extensibility to improve the patients ability to .perform daily activities with decreased pain and symptom levels  The manual therapy interventions were performed at a separate and distinct time from the therapeutic activities interventions.           With   [] TE   [] TA   [] neuro   [] other: Patient Education: [x] Review HEP    [] Progressed/Changed HEP based on:   [] positioning   [] body mechanics   [] transfers   [] heat/ice application    [] other:      Other Objective/Functional Measures:   Able to drop past midline bilaterally post session with hip add drop  Lateral weight shift with step downs and lateral lunge with slider right > left      Pain Level (0-10 scale) post treatment: 0    ASSESSMENT/Changes in Function: Pt tolerated session well with no increase in knee pain with exercises however continued difficulty with maintaining weight on heels and shifting posteriorly with exercises needing tactile cues to correct to facilitate gluts. Increased weight bearing noted through UE with crab reach arounds. Patient will continue to benefit from skilled PT services to modify and progress therapeutic interventions, address functional mobility deficits, address ROM deficits, address strength deficits, analyze and address soft tissue restrictions, analyze and cue movement patterns, analyze and modify body mechanics/ergonomics, assess and modify postural abnormalities and instruct in home and community integration to attain remaining goals. []  See Plan of Care  []  See progress note/recertification  []  See Discharge Summary         Progress towards goals / Updated goals:  Short Term Goals: STG- To be accomplished in 5 visit(s):  1.  Pt will be independent with HEP to encourage prophylaxis. Eval:dispensed   Last PN: compliance per pt report goal MET     Long Term Goals: LTG- To be accomplished in 14 visit(s):  1.  Pt will be able to descend > 2 flights stairs without pain to improve mobility around office and home. Eval:pain with descending   Last PN: 2/10 max pain with walking, stairs however able to complete eccentric taps downs in clinic without pain or right knee valgus progressing      2.  Pt will improve hip adduction drop to negative bilaterally with HEP to indicate femoral-acetabular mobility needed for gait.   Eval:+ bilaterally, unable to assume test position on the left  Last PN: + bilaterally however able to drop past midline some bilaterally  Current: able to drop past midline bilaterally post session progressing 1/25     3.  Pt will improve LORA squat to 4/5 to indicate mobility and strength needed recreational activities  Eval:LORA Squat: 2/5, pronounced valgus on right   Last PN: able to complete squat to parallel without valgus or lumbar extension goal MET      4.  Pt FOTO score will increase by 8 points to show improvement in  function. Eval:65 (risk adjusted score 51)  Last PN:  67  Current: 75 goal MET 1/22     Updated goal 1/12/21  1.  Pt will be able to complete SL squat to above parallel with good form and no pain to demonstrate improved functional glut strength  Last PN: right knee valgus and LOB with SL squat to table   Current: valgus with left LE, poor control with decent bilaterally 1/20       PLAN  []  Upgrade activities as tolerated     [x]  Continue plan of care  []  Update interventions per flow sheet       []  Discharge due to:_  []  Other:_      Ariadna Power Remesic 1/25/2021  11:17 AM    Future Appointments   Date Time Provider Tracy Tsai   1/27/2021  1:00 PM Reyes Maldonado PT, DPT [facility]   2/1/2021  2:00 PM Vikas Ariadna Power [facility]   2/4/2021  2:30 PM Remdaryn Ariadna Power [facility]   2/11/2021  3:15 PM Reyes Maldonado PT, DPT [facility]   2/12/2021  9:30 AM Vikas Ariadna Power [facility]   2/16/2021  2:30 PM Reyes Maldonado PT, DPT [facility]   2/18/2021  2:30 PM Remdaryn Ariadna Power [facility]   2/22/2021  2:30 PM Reyes Maldonado PT, DPT [facility]   2/25/2021 11:45 AM [facility]

## 2021-01-27 ENCOUNTER — HOSPITAL ENCOUNTER (OUTPATIENT)
Dept: PHYSICAL THERAPY | Age: 51
Discharge: HOME | End: 2021-01-27
Payer: OTHER GOVERNMENT

## 2021-01-27 PROCEDURE — 97110 THERAPEUTIC EXERCISES: CPT

## 2021-01-27 PROCEDURE — 97140 MANUAL THERAPY 1/> REGIONS: CPT

## 2021-01-27 PROCEDURE — 97112 NEUROMUSCULAR REEDUCATION: CPT

## 2021-01-27 PROCEDURE — 97530 THERAPEUTIC ACTIVITIES: CPT

## 2021-01-27 NOTE — PROGRESS NOTES
PT DAILY TREATMENT NOTE    Patient Name: Lilibeth Fuelling  Date:2021  : 1970  [x]  Patient  Verified  Payor: ANTONY / Plan: Lex Ordoñez 74 / Product Type:  /    In time:12:53 pm  Out time:1:55 pm   Total Treatment Time (min): 62  Total Timed Codes (min): 62  Visit #: 12 of 14    Treatment Area: Right knee pain [M25.561]    SUBJECTIVE  Pain Level (0-10 scale): 0  Any medication changes, allergies to medications, adverse drug reactions, diagnosis change, or new procedure performed?: [x] No    [] Yes (see summary sheet for update)  Subjective functional status/changes:   [] No changes reported  \"I am pretty good. \"    OBJECTIVE      20 min Therapeutic Exercise:  [x] See flow sheet :   Rationale: increase ROM, increase strength, improve coordination and increase proprioception to improve the patients ability to perform daily activities with decreased pain and symptom levels      12 min Therapeutic Activity:  [x]  See flow sheet :   Rationale: increase ROM, increase strength, improve coordination and increase proprioception  to improve the patients ability to perform daily activities with decreased pain and symptom levels       20 min Neuromuscular Re-education:  [x]  See flow sheet :   Rationale: increase ROM, increase strength, improve coordination, improve balance and increase proprioception  to improve the patients ability to perform daily activities with decreased pain and symptom levels      10 min Manual Therapy:  Manual stretching of left posterior hip capsule in right S/L with iliac depreassion    Rationale: decrease pain, increase ROM, increase tissue extensibility and increase postural awareness to improve the patients ability to perform daily activities with decreased pain and symptom levels    The manual therapy interventions were performed at a separate and distinct time from the therapeutic activities interventions.           With   [] TE   [] TA   [] neuro   [] other: Patient Education: [x] Review HEP    [] Progressed/Changed HEP based on:   [] positioning   [] body mechanics   [] transfers   [] heat/ice application    [] other:      Other Objective/Functional Measures:   LORA Special Tests (pre/post)  HGIR:                                                 Right: 55*/65             Left: 65*/70  Hip Add Drop Test:                           Right: midline/-         Left:+/midline  Trunk Rot                                           Right: 15.5 in/15 in  Left 16.5 in /15 in  Shoulder Horizontal Abduction          Right: 35 /NT             Left: 25 /33        Pain Level (0-10 scale) post treatment: 0    ASSESSMENT/Changes in Function:   Pt responded well to left posterior hip capsule inhibition in manual position. Also responded well to band at proximal tibia with lateral lunges and slider. Patient will continue to benefit from skilled PT services to modify and progress therapeutic interventions, address functional mobility deficits, address ROM deficits, address strength deficits, analyze and address soft tissue restrictions, analyze and cue movement patterns, analyze and modify body mechanics/ergonomics, assess and modify postural abnormalities and instruct in home and community integration to attain remaining goals. []  See Plan of Care  []  See progress note/recertification  []  See Discharge Summary         Progress towards goals / Updated goals:  Short Term Goals: STG- To be accomplished in 5 visit(s):  1.  Pt will be independent with HEP to encourage prophylaxis. Eval:dispensed   Last PN: compliance per pt report goal MET     Long Term Goals: LTG- To be accomplished in 14 visit(s):  1.  Pt will be able to descend > 2 flights stairs without pain to improve mobility around office and home. Eval:pain with descending   Last PN: 2/10 max pain with walking, stairs however able to complete eccentric taps downs in clinic without pain or right knee valgus progressing      2.  Pt will improve hip adduction drop to negative bilaterally with HEP to indicate femoral-acetabular mobility needed for gait. Eval:+ bilaterally, unable to assume test position on the left  Last PN: + bilaterally however able to drop past midline some bilaterally  Current: able to drop past midline bilaterally post session progressing 1/25     3.  Pt will improve LORA squat to 4/5 to indicate mobility and strength needed recreational activities  Eval:LORA Squat: 2/5, pronounced valgus on right   Last PN: able to complete squat to parallel without valgus or lumbar extension goal MET      4.  Pt FOTO score will increase by 8 points to show improvement in  function. Eval:65 (risk adjusted score 51)  Last PN:  67  Current: 75 goal MET 1/22     Updated goal 1/12/21  1.  Pt will be able to complete SL squat to above parallel with good form and no pain to demonstrate improved functional glut strength  Last PN: right knee valgus and LOB with SL squat to table   Current: valgus with left LE, poor control with decent bilaterally 1/20 1/27/21 progressing responded well with band with SL activities for knee control       PLAN  [x]  Upgrade activities as tolerated     []  Continue plan of care  []  Update interventions per flow sheet       []  Discharge due to:_  []  Other:_      Marcy Kenney PT, DPT 1/27/2021  1:02 PM    Future Appointments   Date Time Provider [place] Tsai   2/1/2021  2:00 PM Mil Bean [facility]   2/4/2021  2:30 PM Mil Bean [facility]   2/11/2021  3:15 PM Archana Lara PT, DPT [facility]   2/12/2021  9:30 AM Mil Bean [facility]   2/16/2021  2:30 PM Archana Lara PT, DPT [facility]   2/18/2021  2:30 PM Mil Bean [facility]   2/22/2021  2:30 PM Archana Lara PT, DPT [facility]   2/25/2021 11:45 AM Mil Bean [facility]

## 2021-01-28 ENCOUNTER — APPOINTMENT (OUTPATIENT)
Dept: PHYSICAL THERAPY | Age: 51
End: 2021-01-28
Payer: OTHER GOVERNMENT

## 2021-02-01 ENCOUNTER — HOSPITAL ENCOUNTER (OUTPATIENT)
Dept: PHYSICAL THERAPY | Age: 51
Discharge: HOME | End: 2021-02-01
Payer: OTHER GOVERNMENT

## 2021-02-01 PROCEDURE — 97112 NEUROMUSCULAR REEDUCATION: CPT

## 2021-02-01 PROCEDURE — 97110 THERAPEUTIC EXERCISES: CPT

## 2021-02-01 PROCEDURE — 97530 THERAPEUTIC ACTIVITIES: CPT

## 2021-02-01 NOTE — PROGRESS NOTES
PT DAILY TREATMENT NOTE    Patient Name: Arie Tyson  Date:2021  : 1970  [x]  Patient  Verified  Payor:  / Plan: Lex Ordoñez 74 / Product Type:  /    In time:1:55  Out time:2:50  Total Treatment Time (min): 55  Total Timed Codes (min): 55  1:1 Treatment Time ( [address] only): 54   Visit #: 13 of 14    Treatment Area: Right knee pain [M25.561]    SUBJECTIVE  Pain Level (0-10 scale): 0  Any medication changes, allergies to medications, adverse drug reactions, diagnosis change, or new procedure performed?: [x] No    [] Yes (see summary sheet for update)  Subjective functional status/changes:   [] No changes reported  \"Good. I am going hiking this weekend. My right shoulder feels tight today. \"    OBJECTIVE    25 min Therapeutic Exercise:  [x] See flow sheet :   Rationale: increase ROM and increase strength to improve the patients ability to perform daily activities with decreased pain and symptom levels    15 min Therapeutic Activity:  []  See flow sheet :   Rationale: increase strength, improve coordination and increase proprioception  to improve the patients ability to perform daily activities with decreased pain and symptom levels     15 min Neuromuscular Re-education:  [x]  See flow sheet :   Rationale: improve coordination, improve balance and increase proprioception  to improve the patients ability to perform daily activities with decreased pain and symptom levels    With   [] TE   [] TA   [] neuro   [] other: Patient Education: [x] Review HEP    [] Progressed/Changed HEP based on:   [] positioning   [] body mechanics   [] transfers   [] heat/ice application    [] other:      Other Objective/Functional Measures:   Improved form with SL squat with UE assist     Pain Level (0-10 scale) post treatment: 0    ASSESSMENT/Changes in Function: Pt tolerated session well with ability to decrease right knee pain with keeping weight on heels however consistent cues needed to maintain big toe contact with ground. Improved form with slider lunge today with no TB needed. Very challenged with runners glut and SL squat however improved form with UE assist.     Patient will continue to benefit from skilled PT services to modify and progress therapeutic interventions, address functional mobility deficits, address ROM deficits, address strength deficits, analyze and cue movement patterns, analyze and modify body mechanics/ergonomics, assess and modify postural abnormalities and instruct in home and community integration to attain remaining goals. []  See Plan of Care  []  See progress note/recertification  []  See Discharge Summary         Progress towards goals / Updated goals:  Short Term Goals: STG- To be accomplished in 5 visit(s):  1.  Pt will be independent with HEP to encourage prophylaxis. Eval:dispensed   Last PN: compliance per pt report goal MET      Long Term Goals: LTG- To be accomplished in 14 visit(s):  1.  Pt will be able to descend > 2 flights stairs without pain to improve mobility around office and home. Eval:pain with descending   Last PN: 2/10 max pain with walking, stairs however able to complete eccentric taps downs in clinic without pain or right knee valgus  Current:       2.  Pt will improve hip adduction drop to negative bilaterally with HEP to indicate femoral-acetabular mobility needed for gait. Eval:+ bilaterally, unable to assume test position on the left  Last PN: + bilaterally however able to drop past midline some bilaterally  Current: able to drop past midline bilaterally post session progressing 1/25     3.  Pt will improve LORA squat to 4/5 to indicate mobility and strength needed recreational activities  Eval:LORA Squat: 2/5, pronounced valgus on right   Last PN: able to complete squat to parallel without valgus or lumbar extension goal MET      4.  Pt FOTO score will increase by 8 points to show improvement in  function.   Eval:65 (risk adjusted score 51)  Last TO:  76  Current: 75 goal MET 1/22     Updated goal 1/12/21  1.  Pt will be able to complete SL squat to above parallel with good form and no pain to demonstrate improved functional glut strength  Last PN: right knee valgus and LOB with SL squat to table   Current: UE assist needed with good form with SL squat, increased left knee valgus compared to right progressing 2/1/2021       PLAN  []  Upgrade activities as tolerated     [x]  Continue plan of care  []  Update interventions per flow sheet       []  Discharge due to:_  []  Other:_      Veronicavi Fontaine 2/1/2021  2:06 PM    Future Appointments   Date Time Provider [place] Tsai   2/4/2021  2:30 PM Zohra Bean [facility]   2/11/2021  3:15 PM Sahara Munoz, PT, DPT [facility]   2/12/2021  9:30 AM Zohra Bean [facility]   2/16/2021  2:30 PM Sahara Munoz, PT, DPT [facility]   2/18/2021  2:30 PM Zohra Bean [facility]   2/22/2021  2:30 PM Sahara Munoz, PT, DPT [facility]   2/25/2021 11:45 AM Zohra Bean [facility]

## 2021-02-04 ENCOUNTER — HOSPITAL ENCOUNTER (OUTPATIENT)
Dept: PHYSICAL THERAPY | Age: 51
Discharge: HOME | End: 2021-02-04
Payer: OTHER GOVERNMENT

## 2021-02-04 PROCEDURE — 97140 MANUAL THERAPY 1/> REGIONS: CPT

## 2021-02-04 PROCEDURE — 97110 THERAPEUTIC EXERCISES: CPT

## 2021-02-04 PROCEDURE — 97112 NEUROMUSCULAR REEDUCATION: CPT

## 2021-02-04 PROCEDURE — 97530 THERAPEUTIC ACTIVITIES: CPT

## 2021-02-04 NOTE — PROGRESS NOTES
PT DAILY TREATMENT NOTE    Patient Name: Melanie Farrar  Date:2021  : 1970  [x]  Patient  Verified  Payor:  / Plan: Libertad Ernst / Product Type:  /    In time:2:25  Out time:3:25  Total Treatment Time (min): 60  Total Timed Codes (min): 60  1:1 Treatment Time ( [address] only): 60   Visit #: 14 of 14    Treatment Area: Right knee pain [M25.561]    SUBJECTIVE  Pain Level (0-10 scale): 0  Any medication changes, allergies to medications, adverse drug reactions, diagnosis change, or new procedure performed?: [x] No    [] Yes (see summary sheet for update)  Subjective functional status/changes:   [] No changes reported  \"better. I still don't know about long distance walking but I will do that this weekend. \"    OBJECTIVE        25 min Therapeutic Exercise:  [] See flow sheet :   Rationale: increase ROM and increase strength to improve the patients ability to perform daily activities with decreased pain and symptom levels    15 min Therapeutic Activity:  [x]  See flow sheet :   Rationale: increase strength, improve coordination and increase proprioception  to improve the patients ability to perform daily activities with decreased pain and symptom levels     10 min Neuromuscular Re-education:  [x]  See flow sheet :   Rationale: increase strength, improve coordination and increase proprioception  to improve the patients ability to perform daily activities with decreased pain and symptom levels    10 min Manual Therapy:  Vacuum cupping to right lats, lumbar paraspinals in lady in glassess and QP prayer   Rationale: decrease pain, increase ROM and increase tissue extensibility to improve the patients ability to perform daily activities with decreased pain and symptom levels  The manual therapy interventions were performed at a separate and distinct time from the therapeutic activities interventions.     With   [] TE   [] TA   [] neuro   [] other: Patient Education: [x] Review HEP    [] Progressed/Changed HEP based on:   [] positioning   [] body mechanics   [] transfers   [] heat/ice application    [] other:      Other Objective/Functional Measures:   LORA Special Tests (pre/post)  Hip Add Drop Test:                           Right: +, able to drop past midline/-            Left:+/+  Trunk Rot                                           Right: 15in/    Left 15in     HS flexibility 40* right, 20* left    Pain Level (0-10 scale) post treatment: 0    ASSESSMENT/Changes in Function: Pt presented to therapy with C/C of acute onset lateral right knee pain in October 2020. Pt has attended 14 sessions with reporting 50% improvement in symptoms. Pt now able to manage stairs without knee pain however pain still with walking > 1 mile and at random to 2/10  inferiorly and laterally in bilateral knees. Overall strength is improving with ability to complete SL activities without pain however still demonstrating valgus right > left especially with lateral lunge with slider. Pt continues to demonstrate decreased hip mobility with positive hip adductor drop bilaterally and decreased HS flexibility bilaterally. Pt would benefit from continued skilled PT services to address remaining unmet goals. Patient will continue to benefit from skilled PT services to modify and progress therapeutic interventions, address functional mobility deficits, address ROM deficits, address strength deficits, analyze and cue movement patterns, analyze and modify body mechanics/ergonomics, assess and modify postural abnormalities and instruct in home and community integration to attain remaining goals. []  See Plan of Care  []  See progress note/recertification  []  See Discharge Summary         Progress towards goals / Updated goals:  Short Term Goals: STG- To be accomplished in 5 visit(s):  1.  Pt will be independent with HEP to encourage prophylaxis.   Eval:dispensed   Last PN: compliance per pt report goal MET      Long Term Goals: LTG- To be accomplished in 14 visit(s):  1.  Pt will be able to descend > 2 flights stairs without pain to improve mobility around office and home. Eval:pain with descending   Last PN: 2/10 max pain with walking, stairs however able to complete eccentric taps downs in clinic without pain or right knee valgus  Current:  0/10 knee pain with stairs goal MET     2.  Pt will improve hip adduction drop to negative bilaterally with HEP to indicate femoral-acetabular mobility needed for gait. Eval:+ bilaterally, unable to assume test position on the left  Last PN: + bilaterally however able to drop past midline some bilaterally  Current: able to drop past midline bilaterally post session progressing 1/25     3.  Pt will improve LORA squat to 4/5 to indicate mobility and strength needed recreational activities  Eval:LORA Squat: 2/5, pronounced valgus on right   Last PN: able to complete squat to parallel without valgus or lumbar extension goal MET      4.  Pt FOTO score will increase by 8 points to show improvement in  function. Eval:65 (risk adjusted score 51)  Last PN:  67  Current: 75 goal MET 1/22     Updated goal 1/12/21  1.  Pt will be able to complete SL squat to above parallel with good form and no pain to demonstrate improved functional glut strength  Last PN: right knee valgus and LOB with SL squat to table   Current: UE assist needed with good form with SL squat, increased left knee valgus compared to right progressing 2/1/2021    PLAN  []  Upgrade activities as tolerated     [x]  Continue plan of care  []  Update interventions per flow sheet       []  Discharge due to:_  []  Other:_       Francheska Rossi 2/4/2021  2:32 PM    Future Appointments   Date Time Provider [place] Tsai   2/11/2021  3:15 PM Susan Ferraro, PT, DPT [facility]   2/12/2021  9:30 AM Tianna BeanTBMIGUEL [facility]   2/16/2021  2:30 PM Susan Ferraro, PT, DPT [facility]   2/18/2021  2:30 PM Tianna BeanHPNARCISO [facility]   2/22/2021 2:30 PM Yolanda Watkins, PT, DPT [facility]   2/25/2021 11:45 AM Antonieta Bean THE [facility]

## 2021-02-04 NOTE — PROGRESS NOTES
[facility] at the [address], [place], [address] [facility]  Phone: [phone]      Fax:  [phone]    Progress Note  Patient name: Bon Flores Start of Care: 2020   Referral source: Marquez Hicks Asa, MD : 1970               Medical Diagnosis: Right knee pain [M25.561]    Onset Date:2020               Treatment Diagnosis: Right Knee Pain    Prior Hospitalization: see medical history Provider#: [id number]   Medications: Verified on Patient summary List    Comorbidities: previous knee surgeries, Hx of LBP   Prior Level of Function: Increase in pain with running, descending stairs   Reported intermittent catching and locking with getting in/out of the car       Visits from Start of Care: 14    Missed Visits: 2    Progress Towards Goals:   Short Term Goals: STG- To be accomplished in 5 visit(s):  1.  Pt will be independent with HEP to encourage prophylaxis. Eval:dispensed   Last PN: compliance per pt report goal MET      Long Term Goals: LTG- To be accomplished in 14 visit(s):  1.  Pt will be able to descend > 2 flights stairs without pain to improve mobility around office and home. Eval:pain with descending   Last PN: 2/10 max pain with walking, stairs however able to complete eccentric taps downs in clinic without pain or right knee valgus  Current:  0/10 knee pain with stairs goal MET     2.  Pt will improve hip adduction drop to negative bilaterally with HEP to indicate femoral-acetabular mobility needed for gait.   Eval:+ bilaterally, unable to assume test position on the left  Last PN: + bilaterally however able to drop past midline some bilaterally  Current: able to drop past midline bilaterally post session progressing      3.  Pt will improve LORA squat to 4/5 to indicate mobility and strength needed recreational activities  Eval:LORA Squat: 2/5, pronounced valgus on right   Last PN: able to complete squat to parallel without valgus or lumbar extension goal MET      4.  Pt FOTO score will increase by 8 points to show improvement in  function. Eval:65 (risk adjusted score 51)  Last PN:  67  Current: 75 goal MET 1/22     Updated goal 1/12/21  1. Pt will be able to complete SL squat to above parallel with good form and no pain to demonstrate improved functional glut strength  Last PN: right knee valgus and LOB with SL squat to table   Current: UE assist needed with good form with SL squat, increased left knee valgus compared to right progressing 2/1/2021     Updated goal 2/4/2021:  1. Pt bilateral HS flexibility will improve to at least 60* to allow pt to walk with less knee pain  Current: 20* left, 40* right    2.  Pt will report >/=80% improvement in symptoms to be able to return to hiking and working out without knee pain  Current: 50% improvement in knee symptoms    Key Functional Changes:   Updated Goals: to be achieved in 4 weeks:   See goals above    ASSESSMENT/RECOMMENDATIONS:  [x]Continue therapy per initial plan/protocol at a frequency of  2 x per week for 4 weeks  []Continue therapy with the following recommended changes:_____________________      _____________________________________________________________________  []Discontinue therapy progressing towards or have reached established goals  []Discontinue therapy due to lack of appreciable progress towards goals  []Discontinue therapy due to lack of attendance or compliance  []Await Physician's recommendations/decisions regarding therapy  []Other:________________________________________________________________    Thank you for this referral.   Kirstin Banuelos 2/4/2021 3:44 PM

## 2021-02-11 ENCOUNTER — HOSPITAL ENCOUNTER (OUTPATIENT)
Dept: PHYSICAL THERAPY | Age: 51
Discharge: HOME | End: 2021-02-11
Payer: OTHER GOVERNMENT

## 2021-02-11 PROCEDURE — 97112 NEUROMUSCULAR REEDUCATION: CPT

## 2021-02-11 PROCEDURE — 97110 THERAPEUTIC EXERCISES: CPT

## 2021-02-11 PROCEDURE — 97530 THERAPEUTIC ACTIVITIES: CPT

## 2021-02-12 ENCOUNTER — HOSPITAL ENCOUNTER (OUTPATIENT)
Dept: PHYSICAL THERAPY | Age: 51
Discharge: HOME | End: 2021-02-12
Payer: OTHER GOVERNMENT

## 2021-02-12 PROCEDURE — 97112 NEUROMUSCULAR REEDUCATION: CPT

## 2021-02-12 PROCEDURE — 97530 THERAPEUTIC ACTIVITIES: CPT

## 2021-02-12 PROCEDURE — 97110 THERAPEUTIC EXERCISES: CPT

## 2021-02-12 PROCEDURE — 97140 MANUAL THERAPY 1/> REGIONS: CPT

## 2021-02-12 NOTE — PROGRESS NOTES
PT DAILY TREATMENT NOTE    Patient Name: Malina Duffy  Date:2021  : 1970  [x]  Patient  Verified  Payor:  / Plan: Lex Ordoñez 74 / Product Type:  /    In time:9:20  Out time:10:20  Total Treatment Time (min): 60  Total Timed Codes (min): 60  1:1 Treatment Time ([address] only): 60   Visit #: 16 of 24    Treatment Area: Right knee pain [M25.561]    SUBJECTIVE  Pain Level (0-10 scale): 0  Any medication changes, allergies to medications, adverse drug reactions, diagnosis change, or new procedure performed?: [x] No    [] Yes (see summary sheet for update)  Subjective functional status/changes:   [] No changes reported  \"Good. The cupping you did last time really helped. I havent tried walking yet. \"    OBJECTIVE    24 min Therapeutic Exercise:  [] See flow sheet :   Rationale: increase ROM and increase strength to improve the patients ability to perform daily activities with decreased pain and symptom levels    13 min Therapeutic Activity:  [x]  See flow sheet :   Rationale: increase strength, improve coordination and increase proprioception  to improve the patients ability to perform daily activities with decreased pain and symptom levels      15 min Neuromuscular Re-education:  [x]  See flow sheet :   Rationale: improve coordination, improve balance and increase proprioception  to improve the patients ability to perform daily activities with decreased pain and symptom levels    8 min Manual Therapy:   Vacuum cupping to right lats, lumbar paraspinals in lady in glassess    Rationale: decrease pain, increase ROM and increase tissue extensibility to improve the patients ability to perform daily activities with decreased pain and symptom levels  The manual therapy interventions were performed at a separate and distinct time from the therapeutic activities interventions.     With   [] TE   [] TA   [] neuro   [] other: Patient Education: [x] Review HEP    [] Progressed/Changed HEP based on:   [] positioning   [] body mechanics   [] transfers   [] heat/ice application    [] other:      Other Objective/Functional Measures:   Glut fatigue with runners glut at wall    Add drop to midline bilaterally post session     Pain Level (0-10 scale) post treatment: 0    ASSESSMENT/Changes in Function: Pt tolerated session with improved ability to self correct form to decrease left knee pain with exercises. Continues to demonstrate increased valgus with SL activities on left. Able to drop to midline with hip adductor drop bilaterally today again at end of session. Patient will continue to benefit from skilled PT services to modify and progress therapeutic interventions, address functional mobility deficits, address ROM deficits, address strength deficits, analyze and cue movement patterns, analyze and modify body mechanics/ergonomics, assess and modify postural abnormalities and instruct in home and community integration to attain remaining goals. []  See Plan of Care  []  See progress note/recertification  []  See Discharge Summary         Progress towards goals / Updated goals:  Long Term Goals: LTG- To be accomplished in 14 visit(s):     2.  Pt will improve hip adduction drop to negative bilaterally with HEP to indicate femoral-acetabular mobility needed for gait. Eval:+ bilaterally, unable to assume test position on the left  Last PN: + bilaterally however able to drop past midline some bilaterally  Current: able to drop past midline bilaterally post session progressing 2/12      4.  Pt FOTO score will increase by 8 points to show improvement in  function. Eval:65 (risk adjusted score 51)  Last PN:  67  Current: 75 goal MET 1/22     Updated goal 1/12/21  1.  Pt will be able to complete SL squat to above parallel with good form and no pain to demonstrate improved functional glut strength  Last PNUE assist needed with good form with SL squat, increased left knee valgus compared to right progressing 2/1/2021     Updated goal 2/4/2021:  1. Pt bilateral HS flexibility will improve to at least 60* to allow pt to walk with less knee pain  Last PN: 20* left, 40* right     2.  Pt will report >/=80% improvement in symptoms to be able to return to hiking and working out without knee pain  Last PN: 50% improvement in knee symptoms  Current: Progressing 2/11/21 - discussed prior to walking and hiking performing warm-up and added LORA side stepping.        PLAN  []  Upgrade activities as tolerated     [x]  Continue plan of care  []  Update interventions per flow sheet       []  Discharge due to:_  []  Other:_      Airadna Power Remesic 2/12/2021  9:35 AM    Future Appointments   Date Time Provider Tracy Tsai   2/16/2021  2:30 PM Reyes Maldonado[facility] PT[facility] D[facility]   2/18/2021  2:30 PM Ariadna Bean [facility]   2/22/2021  2:30 PM Reyes Maldonado, PT, DPT [facility]   2/25/2021 11:45 AM Ariadna Bean [facility]

## 2021-02-16 ENCOUNTER — HOSPITAL ENCOUNTER (OUTPATIENT)
Dept: PHYSICAL THERAPY | Age: 51
Discharge: HOME | End: 2021-02-16
Payer: OTHER GOVERNMENT

## 2021-02-16 PROCEDURE — 97110 THERAPEUTIC EXERCISES: CPT

## 2021-02-16 PROCEDURE — 97530 THERAPEUTIC ACTIVITIES: CPT

## 2021-02-16 PROCEDURE — 97112 NEUROMUSCULAR REEDUCATION: CPT

## 2021-02-16 PROCEDURE — 97140 MANUAL THERAPY 1/> REGIONS: CPT

## 2021-02-16 NOTE — PROGRESS NOTES
PT DAILY TREATMENT NOTE    Patient Name: Carleen Yañez  Date:2021  : 1970  [x]  Patient  Verified  Payor:  / Plan: Molly Jointer / Product Type:  /    In time:2:25 pm  Out time:3:20 pm  Total Treatment Time (min): 50  Total Timed Codes (min): 50  Visit #: 17 of 24    Treatment Area: Right knee pain [M25.561]    SUBJECTIVE  Pain Level (0-10 scale): 0-2  Any medication changes, allergies to medications, adverse drug reactions, diagnosis change, or new procedure performed?: [x] No    [] Yes (see summary sheet for update)  Subjective functional status/changes:   [] No changes reported  \"My knee is hurting some and so is my elbow. I walked the dog and then it started hurting. I slept on my back and my elbow was sore. \"    OBJECTIVE      10 min Therapeutic Exercise:  [x] See flow sheet :   Rationale: increase ROM, increase strength, improve coordination and increase proprioception to improve the patients ability to perform daily activities with decreased pain and symptom levels      10 min Therapeutic Activity:  [x]  See flow sheet :   Rationale: increase ROM, increase strength, improve coordination and increase proprioception  to improve the patients ability to perform daily activities with decreased pain and symptom levels       25 min Neuromuscular Re-education:  [x]  See flow sheet :   Rationale: increase ROM, increase strength, improve coordination and increase proprioception  to improve the patients ability to perform daily activities with decreased pain and symptom levels      10 min Manual Therapy:  Manual stretching of left posterior hip capsule in right S/L and in Left S/L with right iliac depression    Rationale: decrease pain, increase ROM, increase tissue extensibility and increase postural awareness to improve the patients ability to perform daily activities with decreased pain and symptom levels    The manual therapy interventions were performed at a separate and distinct time from the therapeutic activities interventions. With   [] TE   [] TA   [] neuro   [] other: Patient Education: [x] Review HEP    [] Progressed/Changed HEP based on:   [] positioning   [] body mechanics   [] transfers   [] heat/ice application    [] other:      Other Objective/Functional Measures:   LORA Special Tests (pre/post)  HGIR:                                                 Right: 69/80             Left: 73/85  Hip Add Drop Test:                           Right: +/midline           Left:+/midline  Trunk Rot                                           Right: 16 in/14 in Left 15 in  /14 in  Shoulder Horizontal Abduction          Right: 35 /NT             Left: 24 /35       Pain Level (0-10 scale) post treatment: 0    ASSESSMENT/Changes in Function:   Pt responded very well to left posterior hip capsule inhibition and right glut facilitation. Was able to transition to standing right glut max with max cues. Patient will continue to benefit from skilled PT services to modify and progress therapeutic interventions, address functional mobility deficits, address ROM deficits, address strength deficits, analyze and address soft tissue restrictions, analyze and cue movement patterns, analyze and modify body mechanics/ergonomics, assess and modify postural abnormalities and instruct in home and community integration to attain remaining goals. []  See Plan of Care  []  See progress note/recertification  []  See Discharge Summary         Progress towards goals / Updated goals:  Long Term Goals: LTG- To be accomplished in 14 visit(s):     2.  Pt will improve hip adduction drop to negative bilaterally with HEP to indicate femoral-acetabular mobility needed for gait.   Eval:+ bilaterally, unable to assume test position on the left  Last PN: + bilaterally however able to drop past midline some bilaterally  Current: progressing 2/16/21 midline bilaterally at end of session      4.  Pt FOTO score will increase by 8 points to show improvement in  function. Eval:65 (risk adjusted score 51)  Last PN:  67  Current: 75 goal MET 1/22     Updated goal 1/12/21  1. Pt will be able to complete SL squat to above parallel with good form and no pain to demonstrate improved functional glut strength  Last PNUE assist needed with good form with SL squat, increased left knee valgus compared to right progressing 2/1/2021     Updated goal 2/4/2021:  1. Pt bilateral HS flexibility will improve to at least 60* to allow pt to walk with less knee pain  Last PN: 20* left, 40* right     2. Pt will report >/=80% improvement in symptoms to be able to return to hiking and working out without knee pain  Last PN: 50% improvement in knee symptoms  Current: Progressing 2/16/21 - compliant with warm-up but had pain post waling.  .        PLAN  [x]  Upgrade activities as tolerated     []  Continue plan of care  []  Update interventions per flow sheet       []  Discharge due to:_  []  Other:_      Particia Hanane, PT, DPT 2/16/2021  2:34 PM    Future Appointments   Date Time Provider Tracy Tsai   2/18/2021  2:30 PM Edmond Bean [facility]   2/22/2021  2:30 PM Jean Marquez, PT, DPT [facility]   2/25/2021 11:45 AM Edmond BeanH[facility]ARC[facility]

## 2021-02-18 ENCOUNTER — HOSPITAL ENCOUNTER (OUTPATIENT)
Dept: PHYSICAL THERAPY | Age: 51
Discharge: HOME | End: 2021-02-18
Payer: OTHER GOVERNMENT

## 2021-02-18 PROCEDURE — 97530 THERAPEUTIC ACTIVITIES: CPT

## 2021-02-18 PROCEDURE — 97140 MANUAL THERAPY 1/> REGIONS: CPT

## 2021-02-18 PROCEDURE — 97110 THERAPEUTIC EXERCISES: CPT

## 2021-02-18 NOTE — PROGRESS NOTES
PT DAILY TREATMENT NOTE    Patient Name: Ricardo Monk  Date:2021  : 1970  [x]  Patient  Verified  Payor:  / Plan: Lxe Ordoñez 74 / Product Type:  /    In time:2:25  Out time:3:22  Total Treatment Time (min): 57  Total Timed Codes (min): 57  1:1 Treatment Time ( [address] only): 62   Visit #:  24    Treatment Area: Right knee pain [M25.561]    SUBJECTIVE  Pain Level (0-10 scale): 0  Any medication changes, allergies to medications, adverse drug reactions, diagnosis change, or new procedure performed?: [x] No    [] Yes (see summary sheet for update)  Subjective functional status/changes:   [] No changes reported  \"Elbow is better. \"    OBJECTIVE      27 min Therapeutic Exercise:  [x] See flow sheet :   Rationale: increase ROM and increase strength to improve the patients ability to perform daily activities with decreased pain and symptom levels    20 min Therapeutic Activity:  [x]  See flow sheet :   Rationale: increase strength, improve coordination and increase proprioception  to improve the patients ability to perform daily activities with decreased pain and symptom levels    10 min Manual Therapy:  Vacuum cupping to lumbar paraspinals in lady in glassess, down dog with taps, manual left post hip capsule stretch in right s/l    Rationale: decrease pain, increase ROM and increase tissue extensibility to improve the patients ability to perform daily activities with decreased pain and symptom levels  The manual therapy interventions were performed at a separate and distinct time from the therapeutic activities interventions.     With   [] TE   [] TA   [] neuro   [] other: Patient Education: [x] Review HEP    [] Progressed/Changed HEP based on:   [] positioning   [] body mechanics   [] transfers   [] heat/ice application    [] other:      Other Objective/Functional Measures:   HS flexibility 35* left, 42* right     Pain Level (0-10 scale) post treatment: 0    ASSESSMENT/Changes in Function: Pt tolerated session well with no increase in pain. Pt very challenged with SL hip thrust today with easily fatigued due to glut weakness. Attempted SL RDLs however unable to engage gluts with increase back pain. HS flexibility is improving on the left with almost equal to right. Patient will continue to benefit from skilled PT services to modify and progress therapeutic interventions, address functional mobility deficits, address ROM deficits, address strength deficits, analyze and cue movement patterns, analyze and modify body mechanics/ergonomics, assess and modify postural abnormalities and instruct in home and community integration to attain remaining goals. []  See Plan of Care  []  See progress note/recertification  []  See Discharge Summary         Progress towards goals / Updated goals:  Long Term Goals: LTG- To be accomplished in 14 visit(s):   2.  Pt will improve hip adduction drop to negative bilaterally with HEP to indicate femoral-acetabular mobility needed for gait. Eval:+ bilaterally, unable to assume test position on the left  Last PN: + bilaterally however able to drop past midline some bilaterally  Current: progressing 2/16/21 midline bilaterally at end of session       4.  Pt FOTO score will increase by 8 points to show improvement in  function. Eval:65 (risk adjusted score 51)  Last PN:  67  Current: 75 goal MET 1/22     Updated goal 1/12/21  1. Pt will be able to complete SL squat to above parallel with good form and no pain to demonstrate improved functional glut strength  Last PNUE assist needed with good form with SL squat, increased left knee valgus compared to right progressing 2/1/2021     Updated goal 2/4/2021:  1. Pt bilateral HS flexibility will improve to at least 60* to allow pt to walk with less knee pain  Last PN: 20* left, 40* right  Current: 35* left, 42* right progressing 2/18      2.  Pt will report >/=80% improvement in symptoms to be able to return to hiking and working out without knee pain  Last PN: 50% improvement in knee symptoms  Current: Progressing 2/16/21 - compliant with warm-up but had pain post waling.  .        PLAN  []  Upgrade activities as tolerated     [x]  Continue plan of care  []  Update interventions per flow sheet       []  Discharge due to:_  []  Other:_      Melida Lora 2/18/2021  2:24 PM    Future Appointments   Date Time Provider Tracy Tsai   2/18/2021  2:30 PM Edmond Bean [facility]   2/22/2021  2:30 PM Jean Marquez, PT, DPT [facility]   2/25/2021 11:45 AM Edmond Bean [facility]

## 2021-02-22 ENCOUNTER — HOSPITAL ENCOUNTER (OUTPATIENT)
Dept: PHYSICAL THERAPY | Age: 51
Discharge: HOME | End: 2021-02-22
Payer: OTHER GOVERNMENT

## 2021-02-22 PROCEDURE — 97112 NEUROMUSCULAR REEDUCATION: CPT

## 2021-02-22 PROCEDURE — 97530 THERAPEUTIC ACTIVITIES: CPT

## 2021-02-22 PROCEDURE — 97110 THERAPEUTIC EXERCISES: CPT

## 2021-02-22 PROCEDURE — 97140 MANUAL THERAPY 1/> REGIONS: CPT

## 2021-02-22 NOTE — PROGRESS NOTES
PT DAILY TREATMENT NOTE    Patient Name: Lamine Capps  Date:2021  : 1970  [x]  Patient  Verified  Payor:  / Plan: Maya Ba / Product Type:  /    In time:2:25 pm   Out time:3:30 pm  Total Treatment Time (min): 65  Total Timed Codes (min): 65  Visit #:  of 24    Treatment Area: Right knee pain [M25.561]    SUBJECTIVE  Pain Level (0-10 scale): 0  Any medication changes, allergies to medications, adverse drug reactions, diagnosis change, or new procedure performed?: [x] No    [] Yes (see summary sheet for update)  Subjective functional status/changes:   [] No changes reported  \"I feel pretty good. \"    OBJECTIVE      15 min Therapeutic Exercise:  [x] See flow sheet :   Rationale: increase ROM, increase strength, improve coordination, improve balance and increase proprioception to improve the patients ability to perform daily activities with decreased pain and symptom levels      10 min Therapeutic Activity:  [x]  See flow sheet :   Rationale: increase ROM, increase strength, improve coordination, improve balance and increase proprioception  to improve the patients ability to perform daily activities with decreased pain and symptom levels       30 min Neuromuscular Re-education:  [x]  See flow sheet :   Rationale: increase ROM, increase strength, improve coordination, improve balance and increase proprioception  to improve the patients ability to perform daily activities with decreased pain and symptom levels      10 min Manual Therapy:  Manual stretching of left posterior hip capsule in right S/L and in Left S/L with right iliac depression    Rationale: decrease pain, increase ROM, increase tissue extensibility and increase postural awareness to improve the patients ability to perform daily activities with decreased pain and symptom levels    The manual therapy interventions were performed at a separate and distinct time from the therapeutic activities interventions. With   [] TE   [] TA   [] neuro   [] other: Patient Education: [x] Review HEP    [] Progressed/Changed HEP based on:   [] positioning   [] body mechanics   [] transfers   [] heat/ice application    [] other:      Other Objective/Functional Measures:   LORA Special Tests (pre/post)  HGIR:                                                 Right: 74/80             Left: 85/90  Hip Add Drop Test:                           Right: +/midline          Left:+/midline  Trunk Rot                                           Right: 16.5 in /14 in   Left 17 in /14 in   Shoulder Horizontal Abduction          Right: 45              Left: 35 /41       Pain Level (0-10 scale) post treatment: 0    ASSESSMENT/Changes in Function:   Added retro sled pull this session with good tolerance. Required max verbal cues and positioning cues for correct form. Measures improved markedly after retro sled pull indicating good response to alternating rotation. Patient will continue to benefit from skilled PT services to modify and progress therapeutic interventions, address functional mobility deficits, address ROM deficits, address strength deficits, analyze and address soft tissue restrictions, analyze and cue movement patterns, analyze and modify body mechanics/ergonomics, assess and modify postural abnormalities and instruct in home and community integration to attain remaining goals. []  See Plan of Care  []  See progress note/recertification  []  See Discharge Summary         Progress towards goals / Updated goals:  Long Term Goals: LTG- To be accomplished in 14 visit(s):   2.  Pt will improve hip adduction drop to negative bilaterally with HEP to indicate femoral-acetabular mobility needed for gait.   Eval:+ bilaterally, unable to assume test position on the left  Last PN: + bilaterally however able to drop past midline some bilaterally  Current: progressing 2/22/21 midline bilaterally at end of session       4.  Pt FOTO score will increase by 8 points to show improvement in  function. Eval:65 (risk adjusted score 51)  Last PN:  67  Current: 75 goal MET 1/22     Updated goal 1/12/21  1. Pt will be able to complete SL squat to above parallel with good form and no pain to demonstrate improved functional glut strength  Last PNUE assist needed with good form with SL squat, increased left knee valgus compared to right progressing 2/1/2021     Updated goal 2/4/2021:  1. Pt bilateral HS flexibility will improve to at least 60* to allow pt to walk with less knee pain  Last PN: 20* left, 40* right  Current: 35* left, 42* right progressing 2/18      2.  Pt will report >/=80% improvement in symptoms to be able to return to hiking and working out without knee pain  Last PN: 50% improvement in knee symptoms  Current: Progressing 2/16/21 - compliant with warm-up but had pain post waling. .        PLAN  [x]  Upgrade activities as tolerated     []  Continue plan of care  []  Update interventions per flow sheet       []  Discharge due to:_  []  Other:_      Allyson Jane, PT, DPT 2/22/2021  2:27 PM    Future Appointments   Date Time Provider Tracy Tsai   2/22/2021  2:30 PM Stefanie Munoz, PT, DPT [facility]   2/25/2021 11:45 AM Drew BeanHPTBMIGUEL [facility]

## 2021-02-25 ENCOUNTER — HOSPITAL ENCOUNTER (OUTPATIENT)
Dept: PHYSICAL THERAPY | Age: 51
Discharge: HOME | End: 2021-02-25
Payer: OTHER GOVERNMENT

## 2021-02-25 PROCEDURE — 97112 NEUROMUSCULAR REEDUCATION: CPT

## 2021-02-25 PROCEDURE — 97530 THERAPEUTIC ACTIVITIES: CPT

## 2021-02-25 PROCEDURE — 97140 MANUAL THERAPY 1/> REGIONS: CPT

## 2021-02-25 PROCEDURE — 97110 THERAPEUTIC EXERCISES: CPT

## 2021-02-25 NOTE — PROGRESS NOTES
PT DAILY TREATMENT NOTE    Patient Name: Tristen Enamorado  Date:2021  : 1970  [x]  Patient  Verified  Payor:  / Plan: Lex Ordoñez 74 / Product Type:  /    In time:11:45  Out time:12:45  Total Treatment Time (min): 60  Total Timed Codes (min): 60  1:1 Treatment Time ( [address] only): 60   Visit #: 20  of 24    Treatment Area: Right knee pain [M25.561]    SUBJECTIVE  Pain Level (0-10 scale): 0  Any medication changes, allergies to medications, adverse drug reactions, diagnosis change, or new procedure performed?: [x] No    [] Yes (see summary sheet for update)  Subjective functional status/changes:   [] No changes reported  \"Good. \"    OBJECTIVE      17 min Therapeutic Exercise:  [x] See flow sheet :   Rationale: increase ROM and increase strength to improve the patients ability to perform daily activities with decreased pain and symptom levels    10 min Therapeutic Activity:  []  See flow sheet :   Rationale: increase strength, improve coordination and increase proprioception  to improve the patients ability to perform daily activities with decreased pain and symptom levels     25 min Neuromuscular Re-education:  []  See flow sheet :   Rationale: improve coordination, improve balance and increase proprioception  to improve the patients ability to perform daily activities with decreased pain and symptom levels    8 min Manual Therapy:  Manual stretching of left posterior hip capsule in right S/L and in Left S/L with right iliac depression    Rationale: decrease pain, increase ROM and increase tissue extensibility to improve the patients ability to perform daily activities with decreased pain and symptom levels  The manual therapy interventions were performed at a separate and distinct time from the therapeutic activities interventions.     With   [] TE   [] TA   [] neuro   [] other: Patient Education: [x] Review HEP    [] Progressed/Changed HEP based on:   [] positioning   [] body mechanics   [] transfers   [] heat/ice application    [] other:      Other Objective/Functional Measures:   Good height SL MB bridge  Decreased right hip extension with deadbug    Pain Level (0-10 scale) post treatment: 0    ASSESSMENT/Changes in Function: Pt tolerated session well with no pain. Pt reporting some increase in knee pain with hip abduction machine at gym with education to maintain PPT to decrease knee pain. Very challenged with deadbug exercise with maintaining PPT with opposite UE and LE reach with decreased hip extension bilaterally right > left. Pt reporting continued knee pain with walking even with warmup prior however able to walk longer before pain . Patient will continue to benefit from skilled PT services to modify and progress therapeutic interventions, address functional mobility deficits, address ROM deficits, address strength deficits, analyze and cue movement patterns, analyze and modify body mechanics/ergonomics, assess and modify postural abnormalities, address imbalance/dizziness and instruct in home and community integration to attain remaining goals. []  See Plan of Care  []  See progress note/recertification  []  See Discharge Summary         Progress towards goals / Updated goals:  Long Term Goals: LTG- To be accomplished in 14 visit(s):   2.  Pt will improve hip adduction drop to negative bilaterally with HEP to indicate femoral-acetabular mobility needed for gait. Eval:+ bilaterally, unable to assume test position on the left  Last PN: + bilaterally however able to drop past midline some bilaterally  Current: progressing 2/22/21 midline bilaterally at end of session       4.  Pt FOTO score will increase by 8 points to show improvement in  function. Eval:65 (risk adjusted score 51)  Last PN:  67  Current: 75 goal MET 1/22     Updated goal 1/12/21  1.  Pt will be able to complete SL squat to above parallel with good form and no pain to demonstrate improved functional glut strength  Last PNUE assist needed with good form with SL squat, increased left knee valgus compared to right progressing 2/1/2021     Updated goal 2/4/2021:  1. Pt bilateral HS flexibility will improve to at least 60* to allow pt to walk with less knee pain  Last PN: 20* left, 40* right  Current: 35* left, 42* right progressing 2/18      2. Pt will report >/=80% improvement in symptoms to be able to return to hiking and working out without knee pain  Last PN: 50% improvement in knee symptoms  Current: pain post walking last knee in knees up to 2/10, however able to walk a little further before pain progressing 2/25      PLAN  []  Upgrade activities as tolerated     [x]  Continue plan of care  []  Update interventions per flow sheet       []  Discharge due to:_  []  Other:_      Melida Lora 2/25/2021  12:07 PM    No future appointments.

## 2021-03-03 ENCOUNTER — HOSPITAL ENCOUNTER (OUTPATIENT)
Dept: PHYSICAL THERAPY | Age: 51
Discharge: HOME | End: 2021-03-03
Payer: OTHER GOVERNMENT

## 2021-03-03 PROCEDURE — 97140 MANUAL THERAPY 1/> REGIONS: CPT

## 2021-03-03 PROCEDURE — 97112 NEUROMUSCULAR REEDUCATION: CPT

## 2021-03-03 PROCEDURE — 97530 THERAPEUTIC ACTIVITIES: CPT

## 2021-03-03 NOTE — PROGRESS NOTES
PT DAILY TREATMENT NOTE    Patient Name: Carleen Yañez  Date:3/3/2021  : 1970  [x]  Patient  Verified  Payor:  / Plan: Molly Jointer / Product Type:  /    In time:1:35 pm  Out time:2:40 pm   Total Treatment Time (min): 65  Total Timed Codes (min): 55  Visit #:  of 24    Treatment Area: Right knee pain [M25.561]    SUBJECTIVE  Pain Level (0-10 scale): 0  Any medication changes, allergies to medications, adverse drug reactions, diagnosis change, or new procedure performed?: [x] No    [] Yes (see summary sheet for update)  Subjective functional status/changes:   [] No changes reported  \"It is ok. It just aches after I walk. \"    OBJECTIVE    15 min Therapeutic Exercise:  [x] See flow sheet :   Rationale: increase ROM, increase strength, improve coordination and increase proprioception to improve the patients ability to perform daily activities with decreased pain and symptom levels      10 min Therapeutic Activity:  [x]  See flow sheet :   Rationale: increase ROM, increase strength, improve coordination and increase proprioception  to improve the patients ability to perform daily activities with decreased pain and symptom levels       25 min Neuromuscular Re-education:  [x]  See flow sheet :   Rationale: increase ROM, increase strength, improve coordination, improve balance and increase proprioception  to improve the patients ability to perform daily activities with decreased pain and symptom levels      10 min Manual Therapy:  Manual stretching of left posterior hip capsule in right S/L and in Left S/L with right iliac depression    Rationale: decrease pain, increase ROM, increase tissue extensibility and increase postural awareness to improve the patients ability to perform daily activities with decreased pain and symptom levels    The manual therapy interventions were performed at a separate and distinct time from the therapeutic activities interventions.           With [] TE   [] TA   [] neuro   [] other: Patient Education: [x] Review HEP    [] Progressed/Changed HEP based on:   [] positioning   [] body mechanics   [] transfers   [] heat/ice application    [] other:      Other Objective/Functional Measures:   LORA Special Tests (pre/post)  HGIR:                                                 Right: 67/75             Left: 85/85  Hip Add Drop Test:                           Right: +/midline            Left:+/midline  Trunk Rot                                           Right: 16.5  In/14 in Left 16 in  /14 in  Shoulder Horizontal Abduction          Right: 37 /45             Left: 25 /39       Pain Level (0-10 scale) post treatment: 0    ASSESSMENT/Changes in Function:   Pt responded well to treatment this session. Was able to tolerate down dog/all 4 belly lift. Was able to facilitate left hamstring with cues to left obliques. At present reports achiness after walking. Patient will continue to benefit from skilled PT services to modify and progress therapeutic interventions, address functional mobility deficits, address ROM deficits, address strength deficits, analyze and address soft tissue restrictions, analyze and cue movement patterns, analyze and modify body mechanics/ergonomics, assess and modify postural abnormalities and instruct in home and community integration to attain remaining goals. []  See Plan of Care  []  See progress note/recertification  []  See Discharge Summary         Progress towards goals / Updated goals:  Long Term Goals: LTG- To be accomplished in 14 visit(s):   2.  Pt will improve hip adduction drop to negative bilaterally with HEP to indicate femoral-acetabular mobility needed for gait.   Eval:+ bilaterally, unable to assume test position on the left  Last PN: + bilaterally however able to drop past midline some bilaterally  Current: progressing 3/2/21 midline bilaterally at end of session       4.  Pt FOTO score will increase by 8 points to show improvement in  function. Eval:65 (risk adjusted score 51)  Last PN:  67  Current: 75 goal MET 1/22     Updated goal 1/12/21  1. Pt will be able to complete SL squat to above parallel with good form and no pain to demonstrate improved functional glut strength  Last PN: UE assist needed with good form with SL squat, increased left knee valgus compared to right progressing 2/1/2021  Current: progressing 3/3/21 intermittent valgus with S/L squat bilaterally      Updated goal 2/4/2021:  1. Pt bilateral HS flexibility will improve to at least 60* to allow pt to walk with less knee pain  Last PN: 20* left, 40* right  Current: 35* left, 42* right progressing 2/18      2.  Pt will report >/=80% improvement in symptoms to be able to return to hiking and working out without knee pain  Last PN: 50% improvement in knee symptoms  Current: progressing 3/3/21 at present achiness after walk or hike for exercisee     PLAN  [x]  Upgrade activities as tolerated     []  Continue plan of care  []  Update interventions per flow sheet       []  Discharge due to:_  []  Other:_      Cleve Wolf, PT, DPT 3/3/2021  1:54 PM    Future Appointments   Date Time Provider Tracy Tsai   3/9/2021 12:30 PM Jr Hodge PT, DPT [facility]   3/15/2021  3:15 PM Jr Hodge PT, DPT [facility]   3/17/2021  1:45 PM Jr Hodge PT, DPT [facility]

## 2021-03-05 ENCOUNTER — APPOINTMENT (OUTPATIENT)
Dept: PHYSICAL THERAPY | Age: 51
End: 2021-03-05
Payer: OTHER GOVERNMENT

## 2021-03-09 ENCOUNTER — HOSPITAL ENCOUNTER (OUTPATIENT)
Dept: PHYSICAL THERAPY | Age: 51
Discharge: HOME | End: 2021-03-09
Payer: OTHER GOVERNMENT

## 2021-03-09 PROCEDURE — 97112 NEUROMUSCULAR REEDUCATION: CPT

## 2021-03-09 PROCEDURE — 97110 THERAPEUTIC EXERCISES: CPT

## 2021-03-09 PROCEDURE — 97140 MANUAL THERAPY 1/> REGIONS: CPT

## 2021-03-09 NOTE — PROGRESS NOTES
PT DAILY TREATMENT NOTE    Patient Name: Lamine Capps  Date:3/9/2021  : 1970  [x]  Patient  Verified  Payor:  / Plan: Maya Ba / Product Type:  /    In time:12:23 pm  Out time:1:20 pm   Total Treatment Time (min): 57  Total Timed Codes (min): 62  Visit #: 22 of     Treatment Area: Right knee pain [M25.561]    SUBJECTIVE  Pain Level (0-10 scale): 1  Any medication changes, allergies to medications, adverse drug reactions, diagnosis change, or new procedure performed?: [x] No    [] Yes (see summary sheet for update)  Subjective functional status/changes:   [] No changes reported  \"My back was out all weekend. It started on Thursday night. I tried to walk a little over the weekend but my back and then I got a little knee pain. \"    OBJECTIVE      10 min Therapeutic Exercise:  [x] See flow sheet :   Rationale: increase ROM, increase strength, improve coordination and increase proprioception to improve the patients ability to perform daily activities with decreased pain and symptom levels         17 min Neuromuscular Re-education:  [x]  See flow sheet :   Rationale: increase ROM, increase strength, improve coordination, improve balance and increase proprioception  to improve the patients ability to perform daily activities with decreased pain and symptom levels      30 min Manual Therapy:  LORA AIC correction, Sternal mobilization with active pelvic tilt, Superior T4 (LORA technique), Right subclavius release (LORA technique), LORA infraclavicular rib pump with active breath   Obtained consent for hand placement   Vacuum cupping to thoracic and lumbar paraspinals in child's pose  Instrument augmented STM to right adductors and lateral quad   Rationale: decrease pain, increase ROM, increase tissue extensibility and increase postural awareness to improve the patients ability to perform daily activities with decreased pain and symptom levels    The manual therapy interventions were performed at a separate and distinct time from the therapeutic activities interventions. With   [] TE   [] TA   [] neuro   [] other: Patient Education: [x] Review HEP    [] Progressed/Changed HEP based on:   [] positioning   [] body mechanics   [] transfers   [] heat/ice application    [] other:      Other Objective/Functional Measures:   LORA Special Tests (pre/post)  HGIR:                                                 Right: 68/80             Left: 78/85  Hip Add Drop Test:                           Right: +/-            Left:unable to test/+ with drop almost to midline   Trunk Rot                                           Right: 15 in/14 in   Left 15 in  /14 in  Shoulder Horizontal Abduction          Right: 40 /NT             Left: 32 /NT    See goals for hamstring flexibility scores    Strong erythremic reaction to lateral quad and cupping, especially at T8    Minimal apical expansion   TTP at right adductors       Standing forward flexion: 7 in from gound, no lateralization     Pain Level (0-10 scale) post treatment: 0-1    ASSESSMENT/Changes in Function:   Pt has been seen for 22 visits with C/C of right knee pain. Pt has long standing hx of 5 surgeries on right knee, hx of LBP and right shoulder pain. Has multiple postural compensations and mechanical deficits. Have been addressing pelvic and thoracic position, glut and hamstring facilitation as well as squat mechanics and  Gait mechanics. Pt has acute onset LBP following volunteering at food bank causing temporary right knee pain flare up. Overall pt is progressing nicely toward goals. Plan to continue x8-10 visits.      Patient will continue to benefit from skilled PT services to modify and progress therapeutic interventions, address functional mobility deficits, address ROM deficits, address strength deficits, analyze and address soft tissue restrictions, analyze and cue movement patterns, analyze and modify body mechanics/ergonomics, assess and modify postural abnormalities and instruct in home and community integration to attain remaining goals. []  See Plan of Care  []  See progress note/recertification  []  See Discharge Summary         Progress towards goals / Updated goals:  Long Term Goals: LTG- To be accomplished in 14 visit(s):   2.  Pt will improve hip adduction drop to negative bilaterally with HEP to indicate femoral-acetabular mobility needed for gait. Eval:+ bilaterally, unable to assume test position on the left  Last PN: + bilaterally however able to drop past midline some bilaterally  Current: progressing 3/2/21 midline bilaterally at end of session       4.  Pt FOTO score will increase by 8 points to show improvement in  function. Eval:65 (risk adjusted score 51)  Last PN:  67  Current: 75 goal MET 1/22     Updated goal 1/12/21  1. Pt will be able to complete SL squat to above parallel with good form and no pain to demonstrate improved functional glut strength  Last PN: UE assist needed with good form with SL squat, increased left knee valgus compared to right progressing 2/1/2021  Current: progressing 3/9/21 intermittent valgus with S/L squat bilaterally      Updated goal 2/4/2021:  1. Pt bilateral HS flexibility will improve to at least 60* to allow pt to walk with less knee pain  Last PN: 20* left, 40* right  Current: 55* left, 58* right progressing 3/9/21     2.  Pt will report >/=80% improvement in symptoms to be able to return to hiking and working out without knee pain  Last PN: 50% improvement in knee symptoms  Current: progressing 3/3/21 at present achiness after walk or hike for exercisee        PLAN  [x]  Upgrade activities as tolerated     []  Continue plan of care  []  Update interventions per flow sheet       []  Discharge due to:_  []  Other:_      Noel Hook, PT, DPT 3/9/2021  12:33 PM    Future Appointments   Date Time Provider Tracy Tsai   3/15/2021  3:15 PM Jr Infante, PT, DPT [facility]   3/17/2021 1:45 PM Abiel Wayne, PT, DPT [facility]

## 2021-03-09 NOTE — PROGRESS NOTES
[facility] at the [address], [place], [address] [facility]  Phone: [phone]      Fax:  [phone]    Progress Note  Patient name: Bon Flores Start of Care: 2020   Referral source: Seven Hicks MD : 1970               Medical Diagnosis: Right knee pain [M25.561]    Onset Date:2020               Treatment Diagnosis: Right Knee Pain    Prior Hospitalization: see medical history Provider#: [id number]   Medications: Verified on Patient summary List    Comorbidities: previous knee surgeries, Hx of LBP   Prior Level of Function: Increase in pain with running, descending stairs   Reported intermittent catching and locking with getting in/out of the car       Visits from Start of Care: 22    Missed Visits: 1    Progress Towards Goals:   [address]- To be accomplished in 14 visit(s):   2.  Pt will improve hip adduction drop to negative bilaterally with HEP to indicate femoral-acetabular mobility needed for gait. Eval:+ bilaterally, unable to assume test position on the left  Last PN: + bilaterally however able to drop past midline some bilaterally  Current: progressing 3/2/21 midline bilaterally at end of session       4.  Pt FOTO score will increase by 8 points to show improvement in  function. Eval:65 (risk adjusted score 51)  Last PN:  67  Current: 75 goal MET      Updated goal 21  1. Pt will be able to complete SL squat to above parallel with good form and no pain to demonstrate improved functional glut strength  Last PN: UE assist needed with good form with SL squat, increased left knee valgus compared to right progressing 2021  Current: progressing 3/9/21 intermittent valgus with S/L squat bilaterally      Updated goal 2021:  1.  Pt bilateral HS flexibility will improve to at least 60* to allow pt to walk with less knee pain  Last PN: 20* left, 40* right  Current: 55* left, 58* right progressing 3/9/21     2. Pt will report >/=80% improvement in symptoms to be able to return to hiking and working out without knee pain  Last PN: 50% improvement in knee symptoms  Current: progressing 3/3/21 at present achiness after walk or hike for exercisee     Key Functional Changes:   Pt has been seen for 22 visits with C/C of right knee pain. Pt has long standing hx of 5 surgeries on right knee, hx of LBP and right shoulder pain. Has multiple postural compensations and mechanical deficits. Have been addressing pelvic and thoracic position, glut and hamstring facilitation as well as squat mechanics and  Gait mechanics. Pt has acute onset LBP following volunteering at food bank causing temporary right knee pain flare up. Overall pt is progressing nicely toward goals.  Plan to continue x8-10 visits.      Updated Goals: to be achieved in 8-10 treatments:  Same as above     ASSESSMENT/RECOMMENDATIONS:  [x]Continue therapy per initial plan/protocol at a frequency of  8-10 treatments  []Continue therapy with the following recommended changes:_____________________      _____________________________________________________________________  []Discontinue therapy progressing towards or have reached established goals  []Discontinue therapy due to lack of appreciable progress towards goals  []Discontinue therapy due to lack of attendance or compliance  []Await Physician's recommendations/decisions regarding therapy  []Other:________________________________________________________________    Thank you for this referral.   Dilia Keller, PT, DPT 3/9/2021 1:41 PM

## 2021-03-15 ENCOUNTER — HOSPITAL ENCOUNTER (OUTPATIENT)
Dept: PHYSICAL THERAPY | Age: 51
Discharge: HOME | End: 2021-03-15
Payer: OTHER GOVERNMENT

## 2021-03-15 PROCEDURE — 97110 THERAPEUTIC EXERCISES: CPT

## 2021-03-15 PROCEDURE — 97112 NEUROMUSCULAR REEDUCATION: CPT

## 2021-03-15 PROCEDURE — 97140 MANUAL THERAPY 1/> REGIONS: CPT

## 2021-03-15 NOTE — PROGRESS NOTES
PT DAILY TREATMENT NOTE    Patient Name: Carleen Yañez  Date:3/15/2021  : 1970  [x]  Patient  Verified  Payor:  / Plan: Lex Ordo[address]ez 74 / Product Type:  /    In time:3:10 pm  Out time:4:07 pm  Total Treatment Time (min): 57  Total Timed Codes (min): 57  Visit #: 23 of 32    Treatment Area: Right knee pain [M25.561]    SUBJECTIVE  Pain Level (0-10 scale): 2  Any medication changes, allergies to medications, adverse drug reactions, diagnosis change, or new procedure performed?: [x] No    [] Yes (see summary sheet for update)  Subjective functional status/changes:   [] No changes reported  \"It as like an  - Saturday. I couldn't go from straight to bend. \"    OBJECTIVE      15 min Therapeutic Exercise:  [x] See flow sheet :   Rationale: increase ROM, increase strength, improve coordination and increase proprioception to improve the patients ability to perform daily activities with decreased pain and symptom levels       32 min Neuromuscular Re-education:  [x]  See flow sheet :   Rationale: increase ROM, increase strength, improve coordination and increase proprioception  to improve the patients ability to perform daily activities with decreased pain and symptom levels      20 min Manual Therapy:  Manual stretching bilateral posterior hip capsule  Manual stretching left posterior hip capsule in left S/L with right iliac depression   Instrument augmented STM to right distal lateral quad   Rationale: decrease pain, increase ROM, increase tissue extensibility and increase postural awareness to improve the patients ability to perform daily activities with decreased pain and symptom levels    The manual therapy interventions were performed at a separate and distinct time from the therapeutic activities interventions.           With   [] TE   [] TA   [] neuro   [] other: Patient Education: [x] Review HEP    [] Progressed/Changed HEP based on:   [] positioning   [] body mechanics   [] transfers   [] heat/ice application    [] other:      Other Objective/Functional Measures:   LORA Special Tests (pre/post)  Hip Add Drop Test:                           Right: +/midline           Left:+/midline   Trunk Rot                                           Right: 15 in  Left 15 in     TTP along lateral-anterior right knee joint line  TTP along right vastus lateralis    No right patellofemoral clicking at end of session        Pain Level (0-10 scale) post treatment: 0-1    ASSESSMENT/Changes in Function:   Pt reported acute onset right lateral and anterior knee pain on Wednesday continuing through the weekend. Responded well to instrument augmented STM and posterior hip capsule inhibition. Patient will continue to benefit from skilled PT services to modify and progress therapeutic interventions, address functional mobility deficits, address ROM deficits, address strength deficits, analyze and address soft tissue restrictions, analyze and cue movement patterns, analyze and modify body mechanics/ergonomics, assess and modify postural abnormalities and instruct in home and community integration to attain remaining goals. []  See Plan of Care  []  See progress note/recertification  []  See Discharge Summary         Progress towards goals / Updated goals:  Long Term Goals: LTG- To be accomplished in 14 visit(s):   2.  Pt will improve hip adduction drop to negative bilaterally with HEP to indicate femoral-acetabular mobility needed for gait. Eval:+ bilaterally, unable to assume test position on the left  Last PN: + bilaterally however able to drop past midline some bilaterally  Current: progressing 3/2/21 midline bilaterally at end of session       4.  Pt FOTO score will increase by 8 points to show improvement in  function. Eval:65 (risk adjusted score 51)  Last PN:  67  Current: 75 goal MET 1/22     Updated goal 1/12/21  1.  Pt will be able to complete SL squat to above parallel with good form and no pain to demonstrate improved functional glut strength  Last PN: UE assist needed with good form with SL squat, increased left knee valgus compared to right progressing 2/1/2021  Current: progressing 3/9/21 intermittent valgus with S/L squat bilaterally      Updated goal 2/4/2021:  1. Pt bilateral HS flexibility will improve to at least 60* to allow pt to walk with less knee pain  Last PN: 20* left, 40* right  Current: 55* left, 58* right progressing 3/9/21     2.  Pt will report >/=80% improvement in symptoms to be able to return to hiking and working out without knee pain  Last PN: 50% improvement in knee symptoms  Current: progressing 3/3/21 at present achiness after walk or hike for exercisee        PLAN  [x]  Upgrade activities as tolerated     []  Continue plan of care  [x]  Update interventions per flow sheet       []  Discharge due to:_  []  Other:_      Marilynn Mott, PT, DPT 3/15/2021  3:34 PM    Future Appointments   Date Time Provider [place]   3/17/2021  1:45 PM Nasrin Aguiar, PT, DPT [facility]   4/1/2021  3:15 PM Nasrin Aguiar PT, DPT [facility]

## 2021-03-17 ENCOUNTER — HOSPITAL ENCOUNTER (OUTPATIENT)
Dept: PHYSICAL THERAPY | Age: 51
Discharge: HOME | End: 2021-03-17
Payer: OTHER GOVERNMENT

## 2021-03-17 PROCEDURE — 97140 MANUAL THERAPY 1/> REGIONS: CPT

## 2021-03-17 PROCEDURE — 97112 NEUROMUSCULAR REEDUCATION: CPT

## 2021-03-17 PROCEDURE — 97110 THERAPEUTIC EXERCISES: CPT

## 2021-03-17 NOTE — PROGRESS NOTES
PT DAILY TREATMENT NOTE    Patient Name: Ga Limon  Date:3/17/2021  : 1970  [x]  Patient  Verified  Payor:  / Plan: Lex Ordoñez 74 / Product Type:  /    In time:1:36 pm  Out time:2:35 pm   Total Treatment Time (min): 59  Total Timed Codes (min): 61  Visit #: 24 of 32    Treatment Area: Right knee pain [M25.561]    SUBJECTIVE  Pain Level (0-10 scale): 0  Any medication changes, allergies to medications, adverse drug reactions, diagnosis change, or new procedure performed?: [x] No    [] Yes (see summary sheet for update)  Subjective functional status/changes:   [] No changes reported  \"Much better. Still clicking but doesn't hurt. \"    OBJECTIVE      14 min Therapeutic Exercise:  [x] See flow sheet :   Rationale: increase ROM, increase strength, improve coordination and increase proprioception to improve the patients ability to perform daily activities with decreased pain and symptom levels       35 min Neuromuscular Re-education:  [x]  See flow sheet :   Rationale: increase ROM, increase strength, improve coordination and increase proprioception  to improve the patients ability to perform daily activities with decreased pain and symptom levels       10 min Manual Therapy:  Manual stretching left posterior hip capsule in left S/L with right iliac depression    Rationale: decrease pain, increase ROM, increase tissue extensibility and increase postural awareness to improve the patients ability to perform daily activities with decreased pain and symptom levels    The manual therapy interventions were performed at a separate and distinct time from the therapeutic activities interventions.           With   [] TE   [] TA   [] neuro   [] other: Patient Education: [x] Review HEP    [] Progressed/Changed HEP based on:   [] positioning   [] body mechanics   [] transfers   [] heat/ice application    [] other:      Other Objective/Functional Measures:   LORA Special Tests (pre/post)  HGIR:                                                 Right: 60/83             Left: 60/90  Hip Add Drop Test:                           Right: +/-            Left:+/midline  Trunk Rot                                           Right: 16 in/15 in Left 16 /15 in       Pain Level (0-10 scale) post treatment: 0    ASSESSMENT/Changes in Function:   Decreased right medial knee pain with rib positioning. Responded well to paraspinal inhibition. Challenged with glut facilitation. Patient will continue to benefit from skilled PT services to modify and progress therapeutic interventions, address functional mobility deficits, address ROM deficits, address strength deficits, analyze and address soft tissue restrictions, analyze and cue movement patterns, analyze and modify body mechanics/ergonomics, assess and modify postural abnormalities and instruct in home and community integration to attain remaining goals. []  See Plan of Care  []  See progress note/recertification  []  See Discharge Summary         Progress towards goals / Updated goals:  Long Term Goals: LTG- To be accomplished in 14 visit(s):   2.  Pt will improve hip adduction drop to negative bilaterally with HEP to indicate femoral-acetabular mobility needed for gait. Eval:+ bilaterally, unable to assume test position on the left  Last PN: + bilaterally however able to drop past midline some bilaterally  Current: progressing 3/2/21 midline bilaterally at end of session       4.  Pt FOTO score will increase by 8 points to show improvement in  function. Eval:65 (risk adjusted score 51)  Last PN:  67  Current: 75 goal MET 1/22     Updated goal 1/12/21  1.  Pt will be able to complete SL squat to above parallel with good form and no pain to demonstrate improved functional glut strength  Last PN: UE assist needed with good form with SL squat, increased left knee valgus compared to right progressing 2/1/2021  Current: progressing 3/9/21 intermittent valgus with S/L squat bilaterally      Updated goal 2/4/2021:  1. Pt bilateral HS flexibility will improve to at least 60* to allow pt to walk with less knee pain  Last PN: 20* left, 40* right  Current: 55* left, 58* right progressing 3/9/21     2.  Pt will report >/=80% improvement in symptoms to be able to return to hiking and working out without knee pain  Last PN: 50% improvement in knee symptoms  Current: progressing 3/3/21 at present achiness after walk or hike for exercisee   3/17/21 - NA to assess due to recent exacerbation of pain, but no pain at end of session      PLAN  [x]  Upgrade activities as tolerated     []  Continue plan of care  []  Update interventions per flow sheet       []  Discharge due to:_  []  Other:_      Telma Bolivar PT, DPT 3/17/2021  1:42 PM    Future Appointments   Date Time Provider [place] Tsai   3/17/2021  1:45 PM Lianet Tadeo PT, DPT [facility]   4/1/2021  3:15 PM Lianet Tadeo PT, DPT [facility]   4/12/2021  1:45 PM Lianet Tadeo PT, DPT [facility]   4/14/2021  1:00 PM Lianet Tadeo PT, DPT [facility]   4/19/2021  1:45 PM Lianet Tadeo PT, DPT [facility]   4/21/2021  1:00 PM Lianet Tadeo PT, DPT [facility]   4/26/2021  1:45 PM Linaet Tadeo PT, DPT [facility]   4/28/2021  1:00 PM Lianet Tadeo PT, DPT [facility]

## 2021-03-22 ENCOUNTER — APPOINTMENT (OUTPATIENT)
Dept: PHYSICAL THERAPY | Age: 51
End: 2021-03-22
Payer: OTHER GOVERNMENT

## 2021-03-24 ENCOUNTER — HOSPITAL ENCOUNTER (OUTPATIENT)
Dept: PHYSICAL THERAPY | Age: 51
End: 2021-03-24
Payer: OTHER GOVERNMENT

## 2021-04-01 ENCOUNTER — HOSPITAL ENCOUNTER (OUTPATIENT)
Dept: PHYSICAL THERAPY | Age: 51
End: 2021-04-01
Payer: OTHER GOVERNMENT

## 2021-04-12 ENCOUNTER — HOSPITAL ENCOUNTER (OUTPATIENT)
Dept: PHYSICAL THERAPY | Age: 51
Discharge: HOME | End: 2021-04-12
Payer: OTHER GOVERNMENT

## 2021-04-12 PROCEDURE — 97112 NEUROMUSCULAR REEDUCATION: CPT

## 2021-04-12 PROCEDURE — 97110 THERAPEUTIC EXERCISES: CPT

## 2021-04-12 PROCEDURE — 97530 THERAPEUTIC ACTIVITIES: CPT

## 2021-04-12 PROCEDURE — 97140 MANUAL THERAPY 1/> REGIONS: CPT

## 2021-04-12 NOTE — PROGRESS NOTES
PT DAILY TREATMENT NOTE    Patient Name: Fred Pabon  Date:2021  : 1970  [x]  Patient  Verified  Payor:  / Plan: [facility]  [facility] / Product Type:  /    In time:1:39 pm  Out time:2:40 pm   Total Treatment Time (min): 61  Total Timed Codes (min): 61  Visit #: 25 of 32    Treatment Area: Right knee pain [M25.561]    SUBJECTIVE  Pain Level (0-10 scale): 0  Any medication changes, allergies to medications, adverse drug reactions, diagnosis change, or new procedure performed?: [x] No    [] Yes (see summary sheet for update)  Subjective functional status/changes:   [] No changes reported  \"I had a class and then I went to [place]. My knees switched off on me. The right knee hurt on one hike and the the left knee hurt on the other hike. \"    OBJECTIVE      12 min Therapeutic Exercise:  [x] See flow sheet :   Rationale: increase ROM, increase strength, improve coordination and increase proprioception to improve the patients ability to perform daily activities with decreased pain and symptom levels    10 min Therapeutic Activity:  [x]  See flow sheet :   Rationale: increase ROM, increase strength, improve coordination and increase proprioception  to improve the patients ability to perform daily activities with decreased pain and symptom levels       25 min Neuromuscular Re-education:  [x]  See flow sheet :   Rationale: increase ROM, increase strength, improve coordination and increase proprioception  to improve the patients ability to perform daily activities with decreased pain and symptom levels      14 min Manual Therapy:  Manual stretching of left posterior hip capsule in right S/L  STM to left posterior hip capsule  Active release to left glut med and posterior hip capsule in adductor pull back   Rationale: decrease pain, increase ROM, increase tissue extensibility and increase postural awareness to improve the patients ability to perform daily activities with decreased pain and symptom levels    The manual therapy interventions were performed at a separate and distinct time from the therapeutic activities interventions. With   [] TE   [] TA   [] neuro   [] other: Patient Education: [x] Review HEP    [] Progressed/Changed HEP based on:   [] positioning   [] body mechanics   [] transfers   [] heat/ice application    [] other:      Other Objective/Functional Measures:   LORA Special Tests (pre/post)  HGIR:                                                 Right: 65/85             Left: 65/85  Hip Add Drop Test:                           Right: midline/-         Left:+/midline  Trunk Rot                                           Right: 16 in/14 in Left 15.5 in /14 in  Shoulder Horizontal Abduction          Right: 34              Left: 29      No joint line tenderness bilateral knees     See goals for other data      Pain Level (0-10 scale) post treatment: 0    ASSESSMENT/Changes in Function:   Pt has not been seen in 3 weeks due to pt's schedule and out of town travel. Pt had acute exacerbation of LBP and knee pain in the last 6-8 weeks. Has had bilateral knee pain in recent 1-2 moths. No longer TTP along joint bilaterally. At present pt reports most pain with hiking and walking (pt is an avid hiker). Suspect knee pain is mechanical and resultant of poor lumbo-pelvic- hip mechanics. Patient will continue to benefit from skilled PT services to modify and progress therapeutic interventions, address functional mobility deficits, address ROM deficits, address strength deficits, analyze and address soft tissue restrictions, analyze and cue movement patterns, analyze and modify body mechanics/ergonomics, assess and modify postural abnormalities and instruct in home and community integration to attain remaining goals.      []  See Plan of Care  []  See progress note/recertification  []  See Discharge Summary         Progress towards goals / Updated goals:  Long Term Goals: LTG- To be accomplished in 14 visit(s):   2.  Pt will improve hip adduction drop to negative bilaterally with HEP to indicate femoral-acetabular mobility needed for gait. Eval:+ bilaterally, unable to assume test position on the left  Last PN: + bilaterally however able to drop past midline some bilaterally  Current: progressing 4/12/21 right cleared at end of session left at midline       4.  Pt FOTO score will increase by 8 points to show improvement in  function. Eval:65 (risk adjusted score 51)  Last PN:  67  Current: 75 goal MET 1/22     Updated goal 1/12/21  1. Pt will be able to complete SL squat to above parallel with good form and no pain to demonstrate improved functional glut strength  Last PN: UE assist needed with good form with SL squat, increased left knee valgus compared to right progressing 2/1/2021  Current: progressing 4/12/21 intermittent valgus with S/L squat bilaterally      Updated goal 2/4/2021:  1. Pt bilateral HS flexibility will improve to at least 60* to allow pt to walk with less knee pain  Last PN: 20* left, 40* right  Current: MET 4/12/21 left: 64  Right: 68    2.  Pt will report >/=80% improvement in symptoms to be able to return to hiking and working out without knee pain  Last PN: 50% improvement in knee symptoms  Current: progressing 4/12/21 has had mild set back in last 6 weeks with acute flare up of back pain     PLAN  [x]  Upgrade activities as tolerated     []  Continue plan of care  []  Update interventions per flow sheet       []  Discharge due to:_  []  Other:_      Haley Hoffmann, PT, DPT 4/12/2021  1:38 PM    Future Appointments   Date Time Provider [place] Tsai   4/12/2021  1:45 PM Rema Fall, PT, DPT [facility]   4/14/2021  1:00 PM Rema Fall, PT, DPT [facility]   4/19/2021  1:45 PM Rema Fall, PT, DPT [facility]   4/21/2021  1:00 PM Rema Fall, PT, DPT [facility]   4/26/2021  1:45 PM Rema Fall, PT, DPT [facility]   4/28/2021  1:00 PM Rema Valencia PT, DPT [facility]

## 2021-04-12 NOTE — PROGRESS NOTES
[facility] at the [address], [facility] [place], [address] [facility]  Phone: [phone]      Fax:  [phone]    Progress Note    Patient name: Bon Flores Start of Care: 2020   Referral source: Jaleesa Hicks MD : 1970               Medical Diagnosis: Right knee pain [M25.561]    Onset Date:2020               Treatment Diagnosis: Right Knee Pain    Prior Hospitalization: see medical history Provider#: [id number]   Medications: Verified on Patient summary List    Comorbidities: previous knee surgeries, Hx of LBP   Prior Level of Function: Increase in pain with running, descending stairs   Reported intermittent catching and locking with getting in/out of the car     Visits from Start of Care: 25    Missed Visits: 1    Progress Towards Goals:   [address]- To be accomplished in 14 visit(s):   2.  Pt will improve hip adduction drop to negative bilaterally with HEP to indicate femoral-acetabular mobility needed for gait. Eval:+ bilaterally, unable to assume test position on the left  Last PN: + bilaterally however able to drop past midline some bilaterally  Current: progressing 21 right cleared at end of session left at midline       4.  Pt FOTO score will increase by 8 points to show improvement in  function. Eval:65 (risk adjusted score 51)  Last PN:  67  Current: 75 goal MET      Updated goal 21  1. Pt will be able to complete SL squat to above parallel with good form and no pain to demonstrate improved functional glut strength  Last PN: UE assist needed with good form with SL squat, increased left knee valgus compared to right progressing 2021  Current: progressing 21 intermittent valgus with S/L squat bilaterally      Updated goal 2021:  1.  Pt bilateral HS flexibility will improve to at least 60* to allow pt to walk with less knee pain  Last PN: 20* left, 40* right  Current: MET 21 left: 64 Right: 68     2. Pt will report >/=80% improvement in symptoms to be able to return to hiking and working out without knee pain  Last PN: 50% improvement in knee symptoms  Current: progressing 4/12/21 has had mild set back in last 6 weeks with acute flare up of back pain        Key Functional Changes:   Pt has not been seen in 3 weeks due to pt's schedule and out of town travel. Pt had acute exacerbation of LBP and knee pain in the last 6-8 weeks. Has had bilateral knee pain in recent 1-2 moths. No longer TTP along joint bilaterally. At present pt reports most pain with hiking and walking (pt is an avid hiker).   Suspect knee pain is mechanical and resultant of poor lumbo-pelvic- hip mechanics.      Updated Goals: to be achieved in 8 treatments:   Same as above     ASSESSMENT/RECOMMENDATIONS:  [x]Continue therapy per initial plan/protocol at a frequency of  8 treatments  []Continue therapy with the following recommended changes:_____________________      _____________________________________________________________________  []Discontinue therapy progressing towards or have reached established goals  []Discontinue therapy due to lack of appreciable progress towards goals  []Discontinue therapy due to lack of attendance or compliance  []Await Physician's recommendations/decisions regarding therapy  []Other:________________________________________________________________    Thank you for this referral.   Vanesa Rogers, PT, DPT 4/12/2021 2:58 PM

## 2021-04-14 ENCOUNTER — HOSPITAL ENCOUNTER (OUTPATIENT)
Dept: PHYSICAL THERAPY | Age: 51
Discharge: HOME | End: 2021-04-14
Payer: OTHER GOVERNMENT

## 2021-04-14 PROCEDURE — 97112 NEUROMUSCULAR REEDUCATION: CPT

## 2021-04-14 PROCEDURE — 97140 MANUAL THERAPY 1/> REGIONS: CPT

## 2021-04-14 PROCEDURE — 97110 THERAPEUTIC EXERCISES: CPT

## 2021-04-14 NOTE — PROGRESS NOTES
PT DAILY TREATMENT NOTE    Patient Name: Lex Branch  Date:2021  : 1970  [x]  Patient  Verified  Payor:  / Plan: Lex Ordoñez 74 / Product Type:  /    In time:12:55 pm   Out time:1:55 pm   Total Treatment Time (min): 60  Total Timed Codes (min): 60  Visit #: 26 of 33    Treatment Area: Right knee pain [M25.561]    SUBJECTIVE  Pain Level (0-10 scale): 0  Any medication changes, allergies to medications, adverse drug reactions, diagnosis change, or new procedure performed?: [x] No    [] Yes (see summary sheet for update)  Subjective functional status/changes:   [] No changes reported  \"PT saved me again. I could sleep on my left side and no knee pain. \"    OBJECTIVE      25 min Therapeutic Exercise:  [x] See flow sheet :   Rationale: increase ROM, increase strength, improve coordination and increase proprioception to improve the patients ability to perform daily activities with decreased pain and symptom levels       25 min Neuromuscular Re-education:  [x]  See flow sheet :   Rationale: increase ROM, increase strength, improve coordination, improve balance and increase proprioception  to improve the patients ability to perform daily activities with decreased pain and symptom levels      10 min Manual Therapy:  Manual stretching of left posterior hip capsule in right S/L  STM to left posterior hip capsule  Active release to left glut med and posterior hip capsule in adductor pull back   Rationale: decrease pain, increase ROM, increase tissue extensibility and increase postural awareness to improve the patients ability to perform daily activities with decreased pain and symptom levels    The manual therapy interventions were performed at a separate and distinct time from the therapeutic activities interventions.          With   [] TE   [] TA   [] neuro   [] other: Patient Education: [x] Review HEP    [] Progressed/Changed HEP based on:   [] positioning   [] body mechanics [] transfers   [] heat/ice application    [] other:      Other Objective/Functional Measures:   LORA Special Tests (pre/post)  HGIR:                                                 Right: 60/84             Left: 75/90  Hip Add Drop Test:                           Right: midline/-         Left:+/2 in from table   Trunk Rot                                           Right: 14 in   Left 14 in    Shoulder Horizontal Abduction          Right: 35 /NT             Left: 25 /NT       Pain Level (0-10 scale) post treatment: 0    ASSESSMENT/Changes in Function:   Pt was very challenged with lunge to cross connect. With proper cues and positioning was able to facilitate left ab wall and left glut med. Pt education regarding stance phase mechanics and pelvic positioning. Improved ability to recruit right glut, continues to be challenged with left posterior hip capsule inhibition. Patient will continue to benefit from skilled PT services to modify and progress therapeutic interventions, address functional mobility deficits, address ROM deficits, address strength deficits, analyze and address soft tissue restrictions, analyze and cue movement patterns, analyze and modify body mechanics/ergonomics, assess and modify postural abnormalities and instruct in home and community integration to attain remaining goals. []  See Plan of Care  []  See progress note/recertification  []  See Discharge Summary         Progress towards goals / Updated goals:  Long Term Goals: LTG- To be accomplished in 14 visit(s):   2.  Pt will improve hip adduction drop to negative bilaterally with HEP to indicate femoral-acetabular mobility needed for gait. Eval:+ bilaterally, unable to assume test position on the left  Last PN: progressing 4/12/21 right cleared at end of session left at [address]  4.  Pt FOTO score will increase by 8 points to show improvement in  function.   Eval:65 (risk adjusted score 51)  Last PN:   75 goal MET 1/22     Updated goal 1/12/21  1. Pt will be able to complete SL squat to above parallel with good form and no pain to demonstrate improved functional glut strength  Last PN: UE assist needed with good form with SL squat, increased left knee valgus compared to right progressing 2/1/2021  Current: progressing 4/12/21 intermittent valgus with S/L squat bilaterally      Updated goal 2/4/2021:  1. Pt bilateral HS flexibility will improve to at least 60* to allow pt to walk with less knee pain  Last PN: MET 4/12/21 left: 64             Right: 68     2.  Pt will report >/=80% improvement in symptoms to be able to return to hiking and working out without knee pain  Last PN: progressing 4/12/21 has had mild set back in last 6 weeks with acute flare up of back pain        PLAN  [x]  Upgrade activities as tolerated     []  Continue plan of care  []  Update interventions per flow sheet       []  Discharge due to:_  []  Other:_      Mary Bales PT, DPT 4/14/2021  1:22 PM    Future Appointments   Date Time Provider Tracy Tsai   4/19/2021  1:45 PM Fish Roberts PT, DPT [facility]   4/21/2021  1:00 PM Fish Roberts PT, DPT [facility]   4/26/2021  1:45 PM Fish Roberts PT, DPT [facility]   4/28/2021  1:00 PM Fish Roberts PT, DPT [facility]

## 2021-04-19 ENCOUNTER — HOSPITAL ENCOUNTER (OUTPATIENT)
Dept: PHYSICAL THERAPY | Age: 51
Discharge: HOME | End: 2021-04-19
Payer: OTHER GOVERNMENT

## 2021-04-19 PROCEDURE — 97110 THERAPEUTIC EXERCISES: CPT

## 2021-04-19 PROCEDURE — 97112 NEUROMUSCULAR REEDUCATION: CPT

## 2021-04-19 PROCEDURE — 97140 MANUAL THERAPY 1/> REGIONS: CPT

## 2021-04-19 PROCEDURE — 97530 THERAPEUTIC ACTIVITIES: CPT

## 2021-04-19 NOTE — PROGRESS NOTES
PT DAILY TREATMENT NOTE    Patient Name: Jermaine Meng  Date:2021  : 1970  [x]  Patient  Verified  Payor:  / Plan: Lex Ordoñez 74 / Product Type:  /    In time:1:38 pm  Out time:2:37 pm   Total Treatment Time (min): 59  Total Timed Codes (min): 61  Visit #: 27 of 33    Treatment Area: Right knee pain [M25.561]    SUBJECTIVE  Pain Level (0-10 scale): 3  Any medication changes, allergies to medications, adverse drug reactions, diagnosis change, or new procedure performed?: [x] No    [] Yes (see summary sheet for update)  Subjective functional status/changes:   [] No changes reported  \"I might need you to manipulate me. My back is tight. We built a shed this weekend. \"    OBJECTIVE      13 min Therapeutic Exercise:  [x] See flow sheet :   Rationale: increase ROM, increase strength, improve coordination and increase proprioception to improve the patients ability to perform daily activities with decreased pain and symptom levels      10 min Therapeutic Activity:  [x]  See flow sheet :   Rationale: increase ROM, increase strength, improve coordination and increase proprioception  to improve the patients ability to perform daily activities with decreased pain and symptom levels       19 min Neuromuscular Re-education:  [x]  See flow sheet :   Rationale: increase ROM, increase strength, improve coordination and increase proprioception  to improve the patients ability to perform daily activities with decreased pain and symptom levels      17 min Manual Therapy:  LORA AIC correction, Sternal mobilization with active pelvic tilt, Superior T4 (LORA technique), Right subclavius release (LORA technique), LORA infraclavicular rib pump with active breath   Vacuum cupping to right paraspinals and lat line in left S/L with iliac depression   Obtained consent for hand placement      Rationale: decrease pain, increase ROM, increase tissue extensibility, decrease trigger points and increase postural awareness to improve the patients ability to perform daily activities with decreased pain and symptom levels    The manual therapy interventions were performed at a separate and distinct time from the therapeutic activities interventions. With   [] TE   [] TA   [] neuro   [] other: Patient Education: [x] Review HEP    [] Progressed/Changed HEP based on:   [] positioning   [] body mechanics   [] transfers   [] heat/ice application    [] other:      Other Objective/Functional Measures:   LORA Special Tests (pre/post)  HGIR:                                                 Right: 63/80             Left: 84/87  Hip Add Drop Test:                           Right: +/midline           Left:+ (unable to test)/midline  Trunk Rot                                           Right: 15.5 in/14 in Left 15 in /14 in   Shoulder Horizontal Abduction          Right: 35              Left: 30 /38       Pain Level (0-10 scale) post treatment: 0    ASSESSMENT/Changes in Function:   Pt reported having increased LBP over weekend with helping father build work shed. Responded well to right glut max. Reports overall decreased clicking at right knee with AROM Flex/Ext     Patient will continue to benefit from skilled PT services to modify and progress therapeutic interventions, address functional mobility deficits, address ROM deficits, address strength deficits, analyze and address soft tissue restrictions, analyze and cue movement patterns, analyze and modify body mechanics/ergonomics, assess and modify postural abnormalities and instruct in home and community integration to attain remaining goals.      []  See Plan of Care  []  See progress note/recertification  []  See Discharge Summary         Progress towards goals / Updated goals:  Long Term Goals: LTG- To be accomplished in 14 visit(s):   2.  Pt will improve hip adduction drop to negative bilaterally with HEP to indicate femoral-acetabular mobility needed for gait.  Eval:+ bilaterally, unable to assume test position on the left  Last PN: progressing 4/12/21 right cleared at end of session left at [address]  4.  Pt FOTO score will increase by 8 points to show improvement in  function. Eval:65 (risk adjusted score 51)  Last PN:   75 goal MET 1/22     Updated goal 1/12/21  1. Pt will be able to complete SL squat to above parallel with good form and no pain to demonstrate improved functional glut strength  Last PN: UE assist needed with good form with SL squat, increased left knee valgus compared to right progressing 2/1/2021  Current: progressing 4/12/21 intermittent valgus with S/L squat bilaterally      Updated goal 2/4/2021:  1. Pt bilateral HS flexibility will improve to at least 60* to allow pt to walk with less knee pain  Last PN: MET 4/12/21 left: 64             Right: 68     2. Pt will report >/=80% improvement in symptoms to be able to return to hiking and working out without knee pain  Last PN: progressing 4/12/21 has had mild set back in last 6 weeks with acute flare up of back pain   Current: Progressing 4/19/21 reports improvement, decreased clicking with AROM flex and ext.      PLAN  [x]  Upgrade activities as tolerated     []  Continue plan of care  []  Update interventions per flow sheet       []  Discharge due to:_  []  Other:_      Niecy Diallo, PT, DPT 4/19/2021  1:46 PM    Future Appointments   Date Time Provider Tracy Tsai   4/21/2021  1:00 PM Maren Bhat PT, DPT [facility]   4/26/2021  1:45 PM Maren Bhat PT, DPT [facility]   4/28/2021  1:00 PM Maren Bhat PT, DPT [facility]

## 2021-04-21 ENCOUNTER — HOSPITAL ENCOUNTER (OUTPATIENT)
Dept: PHYSICAL THERAPY | Age: 51
Discharge: HOME | End: 2021-04-21
Payer: OTHER GOVERNMENT

## 2021-04-21 PROCEDURE — 97110 THERAPEUTIC EXERCISES: CPT

## 2021-04-21 PROCEDURE — 97112 NEUROMUSCULAR REEDUCATION: CPT

## 2021-04-21 PROCEDURE — 97530 THERAPEUTIC ACTIVITIES: CPT

## 2021-04-21 NOTE — PROGRESS NOTES
PT DAILY TREATMENT NOTE    Patient Name: Sobia Jimenez  Date:2021  : 1970  [x]  Patient  Verified  Payor:  / Plan: Lex Ordoñez 74 / Product Type:  /    In time:12:55 pm  Out time:2:00 pm   Total Treatment Time (min): 60 (waiting on PT riding bike extra 5 min)  Total Timed Codes (min): 60  Visit #: 28 of 33    Treatment Area: Right knee pain [M25.561]    SUBJECTIVE  Pain Level (0-10 scale): 0  Any medication changes, allergies to medications, adverse drug reactions, diagnosis change, or new procedure performed?: [x] No    [] Yes (see summary sheet for update)  Subjective functional status/changes:   [] No changes reported  \"No ailments today that I am aware of.\"    OBJECTIVE      15 min Therapeutic Exercise:  [x] See flow sheet :   Rationale: increase ROM, increase strength, improve coordination, improve balance and increase proprioception to improve the patients ability to perform daily activities with decreased pain and symptom levels      20 min Therapeutic Activity:  [x]  See flow sheet :   Rationale: increase ROM, increase strength, improve coordination, improve balance and increase proprioception  to improve the patients ability to perform daily activities with decreased pain and symptom levels       25 min Neuromuscular Re-education:  [x]  See flow sheet :   Rationale: increase ROM, increase strength, improve coordination, improve balance and increase proprioception  to improve the patients ability to perform daily activities with decreased pain and symptom levels            With   [] TE   [] TA   [] neuro   [] other: Patient Education: [x] Review HEP    [] Progressed/Changed HEP based on:   [] positioning   [] body mechanics   [] transfers   [] heat/ice application    [] other:      Other Objective/Functional Measures:   LORA Special Tests (pre/post)  HGIR:                                                 Right: 65/75             Left: 75/85  Hip Add Drop Test: Right: +/-            Left:+/midline  Trunk Rot                                           Right: 15 in/14 in Left 15 in /14 in   Shoulder Horizontal Abduction          Right: 39              Left: 29 /35        Pain Level (0-10 scale) post treatment: 0    ASSESSMENT/Changes in Function:   Noted slight knee instability with lunge to a cross connect. Was fatigued and challenged with landmine RDLs supersetted with lunge to a cross connect. Is walking [place] this week and hiking short 9.5 mile trail next week. Patient will continue to benefit from skilled PT services to modify and progress therapeutic interventions, address functional mobility deficits, address ROM deficits, address strength deficits, analyze and address soft tissue restrictions, analyze and cue movement patterns, analyze and modify body mechanics/ergonomics, assess and modify postural abnormalities and instruct in home and community integration to attain remaining goals. []  See Plan of Care  []  See progress note/recertification  []  See Discharge Summary         Progress towards goals / Updated goals:  Long Term Goals: LTG- To be accomplished in 14 visit(s):   2.  Pt will improve hip adduction drop to negative bilaterally with HEP to indicate femoral-acetabular mobility needed for gait. Eval:+ bilaterally, unable to assume test position on the left  Last PN: progressing 4/12/21 right cleared at end of session left at [address]  4.  Pt FOTO score will increase by 8 points to show improvement in  function. Eval:65 (risk adjusted score 51)  Last PN:   75 goal MET 1/22     Updated goal 1/12/21  1.  Pt will be able to complete SL squat to above parallel with good form and no pain to demonstrate improved functional glut strength  Last PN: UE assist needed with good form with SL squat, increased left knee valgus compared to right progressing 2/1/2021  Current: progressing 4/21/21 challenged with and slight varus/valgus with lunge to cross connect     Updated goal 2/4/2021:  1. Pt bilateral HS flexibility will improve to at least 60* to allow pt to walk with less knee pain  Last PN: MET 4/12/21 left: 64             Right: 68     2.  Pt will report >/=80% improvement in symptoms to be able to return to hiking and working out without knee pain  Last PN: progressing 4/12/21 has had mild set back in last 6 weeks with acute flare up of back pain   Current: Progressing 4/19/21 reports improvement, decreased clicking with AROM flex and ext.        PLAN  []  Upgrade activities as tolerated     []  Continue plan of care  []  Update interventions per flow sheet       []  Discharge due to:_  []  Other:_      Niecy Diallo, PT, DPT 4/21/2021  1:08 PM    Future Appointments   Date Time Provider Tracy Tsai   4/26/2021  1:45 PM Maren Bhat, PT, DPT [facility]   4/28/2021  1:00 PM Maren Bhat PT, DPT [facility]

## 2021-04-26 ENCOUNTER — HOSPITAL ENCOUNTER (OUTPATIENT)
Dept: PHYSICAL THERAPY | Age: 51
Discharge: HOME | End: 2021-04-26
Payer: OTHER GOVERNMENT

## 2021-04-26 PROCEDURE — 97530 THERAPEUTIC ACTIVITIES: CPT

## 2021-04-26 PROCEDURE — 97112 NEUROMUSCULAR REEDUCATION: CPT

## 2021-04-26 PROCEDURE — 97110 THERAPEUTIC EXERCISES: CPT

## 2021-04-26 NOTE — PROGRESS NOTES
PT DAILY TREATMENT NOTE    Patient Name: Lazarus Mckeon  Date:2021  : 1970  [x]  Patient  Verified  Payor:  / Plan: [facility] / Product Type:  /    In time:1:40 pm   Out time:2:40 pm  Total Treatment Time (min): 60  Total Timed Codes (min): 60  Visit #: 29 of 33    Treatment Area: Right knee pain [M25.561]    SUBJECTIVE  Pain Level (0-10 scale): 0  Any medication changes, allergies to medications, adverse drug reactions, diagnosis change, or new procedure performed?: [x] No    [] Yes (see summary sheet for update)  Subjective functional status/changes:   [] No changes reported  \"They are just cracking all the time. \"  Reported bilateral knee \"cracking\" especially with waking and getting out of bed in the morning.      OBJECTIVE      15 min Therapeutic Exercise:  [x] See flow sheet :   Rationale: increase ROM, increase strength, improve coordination and increase proprioception to improve the patients ability to perform daily activities with decreased pain and symptom levels      15 min Therapeutic Activity:  [x]  See flow sheet :   Rationale: increase ROM, increase strength, improve coordination, improve balance and increase proprioception  to improve the patients ability to perform daily activities with decreased pain and symptom levels       30 min Neuromuscular Re-education:  [x]  See flow sheet :   Rationale: increase ROM, increase strength, improve coordination, improve balance and increase proprioception  to improve the patients ability to perform daily activities with decreased pain and symptom levels          With   [] TE   [] TA   [] neuro   [] other: Patient Education: [x] Review HEP    [] Progressed/Changed HEP based on:   [] positioning   [] body mechanics   [] transfers   [] heat/ice application    [] other:      Other Objective/Functional Measures:   LORA Special Tests (pre/post)  HGIR:                                                 Right: 65/74 Left: 71/80  Hip Add Drop Test:                           Right: +/-            Left:+/-  Trunk Rot                                           Right: 15 in  Left 15 in   Shoulder Horizontal Abduction          Right: 34              Left: 29 /35          Pain Level (0-10 scale) post treatment: 0    ASSESSMENT/Changes in Function:   Is demonstrating improved body awareness and knee stability with Lunge to cross connect. Responded well to T8 extension and dead bugs with SB. Advised to start each work out with supine crossovers as have proven to reposition in clinic. Patient will continue to benefit from skilled PT services to modify and progress therapeutic interventions, address functional mobility deficits, address ROM deficits, address strength deficits, analyze and address soft tissue restrictions, analyze and cue movement patterns, analyze and modify body mechanics/ergonomics, assess and modify postural abnormalities and instruct in home and community integration to attain remaining goals. []  See Plan of Care  []  See progress note/recertification  []  See Discharge Summary         Progress towards goals / Updated goals:  Long Term Goals: LTG- To be accomplished in 14 visit(s):   2.  Pt will improve hip adduction drop to negative bilaterally with HEP to indicate femoral-acetabular mobility needed for gait. Eval:+ bilaterally, unable to assume test position on the left  Last PN: progressing 4/12/21 right cleared at end of session left at [address] [facility]  4.  Pt FOTO score will increase by 8 points to show improvement in  function. Eval:65 (risk adjusted score 51)  Last PN:   75 goal MET 1/22     Updated goal 1/12/21  1.  Pt will be able to complete SL squat to above parallel with good form and no pain to demonstrate improved functional glut strength  Last PN: UE assist needed with good form with SL squat, increased left knee valgus compared to right progressing 2/1/2021  Current: progressing 4/21/21 challenged with and slight varus/valgus with lunge to cross connect     Updated goal 2/4/2021:  1. Pt bilateral HS flexibility will improve to at least 60* to allow pt to walk with less knee pain  Last PN: MET 4/12/21 left: 64             Right: 68     2.  Pt will report >/=80% improvement in symptoms to be able to return to hiking and working out without knee pain  Last PN: progressing 4/12/21 has had mild set back in last 6 weeks with acute flare up of back pain   Current: Progressing 4/26/21 reports improvement, decreased clicking with AROM flex and ext with abdominal engagement      PLAN  [x]  Upgrade activities as tolerated     []  Continue plan of care  []  Update interventions per flow sheet       []  Discharge due to:_  []  Other:_      Izaiah Lino, PT, DPT 4/26/2021  1:54 PM    Future Appointments   Date Time Provider Tracy Tsai   4/28/2021  1:00 PM [facility]re[facility]

## 2021-04-28 ENCOUNTER — HOSPITAL ENCOUNTER (OUTPATIENT)
Dept: PHYSICAL THERAPY | Age: 51
Discharge: HOME | End: 2021-04-28
Payer: OTHER GOVERNMENT

## 2021-04-28 PROCEDURE — 97112 NEUROMUSCULAR REEDUCATION: CPT

## 2021-04-28 PROCEDURE — 97140 MANUAL THERAPY 1/> REGIONS: CPT

## 2021-04-28 PROCEDURE — 97530 THERAPEUTIC ACTIVITIES: CPT

## 2021-04-28 NOTE — PROGRESS NOTES
PT DAILY TREATMENT NOTE    Patient Name: Sravanthi Rodrigues  Date:2021  : 1970  [x]  Patient  Verified  Payor:  / Plan: Lex Ordoñez 74 / Product Type:  /    In time:12:55 pm   Out time:1:55 pm   Total Treatment Time (min): 60  Total Timed Codes (min): 60 (billed 50 as was on bike for 10 min)  Visit #: 30 of 33    Treatment Area: Right knee pain [M25.561]    SUBJECTIVE  Pain Level (0-10 scale): 0  Any medication changes, allergies to medications, adverse drug reactions, diagnosis change, or new procedure performed?: [x] No    [] Yes (see summary sheet for update)  Subjective functional status/changes:   [] No changes reported  \"My shoulder is tight. And my knees crunch every time I get up. \"    OBJECTIVE    12 min Therapeutic Exercise:  [x] See flow sheet :   Rationale: increase ROM, increase strength, improve coordination and increase proprioception to improve the patients ability to perform daily activities with decreased pain and symptom levels      15 min Therapeutic Activity:  [x]  See flow sheet :   Rationale: increase ROM, increase strength, improve coordination and increase proprioception  to improve the patients ability to perform daily activities with decreased pain and symptom levels       25 min Neuromuscular Re-education:  [x]  See flow sheet :   Rationale: increase ROM, increase strength, improve coordination, improve balance and increase proprioception  to improve the patients ability to perform daily activities with decreased pain and symptom levels      8 min Manual Therapy:  LORA AIC correction, Sternal mobilization with active pelvic tilt, Superior T4 (LORA technique), Right subclavius release (LORA technique), LORA infraclavicular rib pump with active breath   Obtained consent for hand placement    Rationale: decrease pain, increase ROM, increase tissue extensibility and increase postural awareness to improve the patients ability to perform daily activities with decreased pain and symptom levels    The manual therapy interventions were performed at a separate and distinct time from the therapeutic activities interventions. With   [] TE   [] TA   [] neuro   [] other: Patient Education: [x] Review HEP    [] Progressed/Changed HEP based on:   [] positioning   [] body mechanics   [] transfers   [] heat/ice application    [] other:      Other Objective/Functional Measures:   LORA Special Tests (pre/post)  HGIR:                                                 Right: 68/76             Left: 73/80  Hip Add Drop Test:                           Right: +/-            Left:+/-  Trunk Rot                                           Right: 15 in/14 in   Left 15 in  /14 in   Shoulder Horizontal Abduction          Right: 40 /NT             Left: 26 /40      Pain Level (0-10 scale) post treatment: 0    ASSESSMENT/Changes in Function:   No clicking at right shoulder or knees at end of session. Very challenged with T8 extension but improved ability to recruit glut max     Patient will continue to benefit from skilled PT services to modify and progress therapeutic interventions, address functional mobility deficits, address ROM deficits, address strength deficits, analyze and address soft tissue restrictions, analyze and cue movement patterns, analyze and modify body mechanics/ergonomics, assess and modify postural abnormalities and instruct in home and community integration to attain remaining goals. []  See Plan of Care  []  See progress note/recertification  []  See Discharge Summary         Progress towards goals / Updated goals:  Long Term Goals: LTG- To be accomplished in 14 visit(s):   2.  Pt will improve hip adduction drop to negative bilaterally with HEP to indicate femoral-acetabular mobility needed for gait.   Eval:+ bilaterally, unable to assume test position on the left  Last PN: progressing 4/12/21 right cleared at end of session left at midline       4.  Pt FOTO score will increase by 8 points to show improvement in  function. Eval:65 (risk adjusted score 51)  Last PN:   75 goal MET 1/22     Updated goal 1/12/21  1. Pt will be able to complete SL squat to above parallel with good form and no pain to demonstrate improved functional glut strength  Last PN: UE assist needed with good form with SL squat, increased left knee valgus compared to right progressing 2/1/2021  Current: progressing 4/21/21 challenged with and slight varus/valgus with lunge to cross connect     Updated goal 2/4/2021:  1. Pt bilateral HS flexibility will improve to at least 60* to allow pt to walk with less knee pain  Last PN: MET 4/12/21 left: 64             Right: 68     2. Pt will report >/=80% improvement in symptoms to be able to return to hiking and working out without knee pain  Last PN: progressing 4/12/21 has had mild set back in last 6 weeks with acute flare up of back pain   Current: Progressing 4/26/21 reports improvement, decreased clicking with AROM flex and ext with abdominal engagement      PLAN  [x]  Upgrade activities as tolerated     []  Continue plan of care  []  Update interventions per flow sheet       []  Discharge due to:_  []  Other:_      Izaiah Lino, PT, DPT 4/28/2021  1:10 PM    No future appointments.

## 2021-05-10 ENCOUNTER — HOSPITAL ENCOUNTER (OUTPATIENT)
Dept: PHYSICAL THERAPY | Age: 51
Discharge: HOME | End: 2021-05-10
Payer: OTHER GOVERNMENT

## 2021-05-10 PROCEDURE — 97140 MANUAL THERAPY 1/> REGIONS: CPT

## 2021-05-10 PROCEDURE — 97112 NEUROMUSCULAR REEDUCATION: CPT

## 2021-05-10 PROCEDURE — 97530 THERAPEUTIC ACTIVITIES: CPT

## 2021-05-10 PROCEDURE — 97110 THERAPEUTIC EXERCISES: CPT

## 2021-05-10 NOTE — PROGRESS NOTES
PT DAILY TREATMENT NOTE    Patient Name: Katy Reis  Date:5/10/2021  : 1970  [x]  Patient  Verified  Payor:  / Plan: Lex Ordoñez 74 / Product Type:  /    In time:3:05 pm   Out time:4:00  pm  Total Treatment Time (min): 55  Total Timed Codes (min): 55  Visit #: 31 of 33    Treatment Area: Right knee pain [M25.561]    SUBJECTIVE  Pain Level (0-10 scale): 2  Any medication changes, allergies to medications, adverse drug reactions, diagnosis change, or new procedure performed?: [x] No    [] Yes (see summary sheet for update)  Subjective functional status/changes:   [] No changes reported  \"My back is tight. \"  Pt drove to [place] and back over the weekend. No knee pain with hike last week.     OBJECTIVE      7 min Therapeutic Exercise:  [x] See flow sheet :   Rationale: increase ROM, increase strength, improve coordination and increase proprioception to improve the patients ability to perform daily activities with decreased pain and symptom levels      10 min Therapeutic Activity:  [x]  See flow sheet :   Rationale: increase ROM, increase strength, improve coordination and increase proprioception  to improve the patients ability to perform daily activities with decreased pain and symptom levels       28 min Neuromuscular Re-education:  [x]  See flow sheet :   Rationale: increase ROM, increase strength, improve coordination and increase proprioception  to improve the patients ability to perform daily activities with decreased pain and symptom levels      10 min Manual Therapy:   LORA AIC correction, Sternal mobilization with active pelvic tilt, Superior T4 (LORA technique), Right subclavius release (LORA technique), LORA infraclavicular rib pump with active breath   Active release to left posterior hip with adductor pull back    Rationale: decrease pain, increase ROM, increase tissue extensibility and increase postural awareness to improve the patients ability to perform daily activities with decreased pain and symptom levels    The manual therapy interventions were performed at a separate and distinct time from the therapeutic activities interventions. With   [] TE   [] TA   [] neuro   [] other: Patient Education: [x] Review HEP    [] Progressed/Changed HEP based on:   [] positioning   [] body mechanics   [] transfers   [] heat/ice application    [] other:      Other Objective/Functional Measures:   LORA Special Tests (pre/post)  HGIR:                                                 Right: 58/75             Left: 70/80  Hip Add Drop Test:                           Right: +/-            Left:+/-  Trunk Rot                                           Right: 15.5 /14   Left 15.5 in /14 in   Shoulder Horizontal Abduction          Right: 40              Left: 33        Pain Level (0-10 scale) post treatment: 0    ASSESSMENT/Changes in Function:   Pt reported increased back pain with driving. Had no knee pain prior to driving with hiking last week. Responded best to left posterior hip capsule inhibition. Patient will continue to benefit from skilled PT services to modify and progress therapeutic interventions, address functional mobility deficits, address ROM deficits, address strength deficits, analyze and address soft tissue restrictions, analyze and cue movement patterns, analyze and modify body mechanics/ergonomics, assess and modify postural abnormalities and instruct in home and community integration to attain remaining goals. []  See Plan of Care  []  See progress note/recertification  []  See Discharge Summary         Progress towards goals / Updated goals:  Long Term Goals: LTG- To be accomplished in 14 visit(s):   2.  Pt will improve hip adduction drop to negative bilaterally with HEP to indicate femoral-acetabular mobility needed for gait.   Eval:+ bilaterally, unable to assume test position on the left  Last PN: progressing 4/12/21 right cleared at end of session left at [address]  4.  Pt FOTO score will increase by 8 points to show improvement in  function. Eval:65 (risk adjusted score 51)  Last PN:   75 goal MET 1/22     Updated goal 1/12/21  1. Pt will be able to complete SL squat to above parallel with good form and no pain to demonstrate improved functional glut strength  Last PN: UE assist needed with good form with SL squat, increased left knee valgus compared to right progressing 2/1/2021  Current: progressing 4/21/21 challenged with and slight varus/valgus with lunge to cross connect     Updated goal 2/4/2021:  1. Pt bilateral HS flexibility will improve to at least 60* to allow pt to walk with less knee pain  Last PN: MET 4/12/21 left: 64             Right: 68     2.  Pt will report >/=80% improvement in symptoms to be able to return to hiking and working out without knee pain  Last PN: progressing 4/12/21 has had mild set back in last 6 weeks with acute flare up of back pain   Current: Progressing 4/26/21 reports improvement, decreased clicking with AROM flex and ext with abdominal engagement      PLAN  [x]  Upgrade activities as tolerated     []  Continue plan of care  []  Update interventions per flow sheet       []  Discharge due to:_  []  Other:_      Avila Kaur, PT, DPT 5/10/2021  3:26 PM    Future Appointments   Date Time Provider [place] Tsai   5/12/2021  1:00 PM Yvon Bean [facility]

## 2021-05-12 ENCOUNTER — HOSPITAL ENCOUNTER (OUTPATIENT)
Dept: PHYSICAL THERAPY | Age: 51
Discharge: HOME | End: 2021-05-12
Payer: OTHER GOVERNMENT

## 2021-05-12 PROCEDURE — 97110 THERAPEUTIC EXERCISES: CPT

## 2021-05-12 PROCEDURE — 97112 NEUROMUSCULAR REEDUCATION: CPT

## 2021-05-12 PROCEDURE — 97530 THERAPEUTIC ACTIVITIES: CPT

## 2021-05-12 NOTE — PROGRESS NOTES
PT DAILY TREATMENT NOTE    Patient Name: Lex Branch  Date:2021  : 1970  [x]  Patient  Verified  Payor:  / Plan: Lex Ordoñez 74 / Product Type:  /    In time:12:45  Out time:1:47  Total Treatment Time (min): 62  Total Timed Codes (min): 62  1:1 Treatment Time ([address] only): 62   Visit #: 28 of 33    Treatment Area: Right knee pain [M25.561]    SUBJECTIVE  Pain Level (0-10 scale): 0  Any medication changes, allergies to medications, adverse drug reactions, diagnosis change, or new procedure performed?: [x] No    [] Yes (see summary sheet for update)  Subjective functional status/changes:   [] No changes reported  \"good. Back is just tight. \"    OBJECTIVE      10 min Therapeutic Exercise:  [] See flow sheet :   Rationale: increase ROM and increase strength to improve the patients ability to perform daily activities with decreased pain and symptom levels    10 min Therapeutic Activity:  []  See flow sheet :   Rationale: increase strength, improve coordination and increase proprioception  to improve the patients ability to perform daily activities with decreased pain and symptom levels     34 min Neuromuscular Re-education:  [x]  See flow sheet :   Rationale: increase strength, improve coordination and increase proprioception  to improve the patients ability to perform daily activities with decreased pain and symptom levels    8 min Manual Therapy:  Cupping to lumbar paraspinals in prayer position and QP SA; manual posterior left hip capusle stretch in right s/l   Rationale: decrease pain, increase ROM and increase tissue extensibility to improve the patients ability to perform daily activities with decreased pain and symptom levels  The manual therapy interventions were performed at a separate and distinct time from the therapeutic activities interventions.           With   [] TE   [] TA   [] neuro   [] other: Patient Education: [x] Review HEP    [] Progressed/Changed HEP based on:   [] positioning   [] body mechanics   [] transfers   [] heat/ice application    [] other:      Other Objective/Functional Measures:   Trunk rotation 15in     Pain Level (0-10 scale) post treatment: 0    ASSESSMENT/Changes in Function: Pt presented to therapy with C/C of acute onset lateral right knee pain in October 2020. Pt is making great progress towards goals with overall knee pain decreasing with ability to hike last week without knee pain however exacerbation of left sided low back pain with driving back from [place]. Overall mobility in pelvis in improving however still very challenged with decreasing valgus/varus with SL activities. Pt would benefit from continued skilled PT services to address remaining unmet goals. Patient will continue to benefit from skilled PT services to modify and progress therapeutic interventions, address functional mobility deficits, address ROM deficits, address strength deficits, analyze and address soft tissue restrictions, analyze and cue movement patterns, analyze and modify body mechanics/ergonomics, assess and modify postural abnormalities and instruct in home and community integration to attain remaining goals. []  See Plan of Care  []  See progress note/recertification  []  See Discharge Summary         Progress towards goals / Updated goals:  Long Term Goals: LTG- To be accomplished in 14 visit(s):   2.  Pt will improve hip adduction drop to negative bilaterally with HEP to indicate femoral-acetabular mobility needed for gait. Eval:+ bilaterally, unable to assume test position on the left  Last PN: progressing 4/12/21 right cleared at end of session left at midline   Current: midline bilaterally progressing 5/12/21      4.  Pt FOTO score will increase by 8 points to show improvement in  function. Eval:65 (risk adjusted score 51)  Last PN:   75 goal MET 1/22     Updated goal 1/12/21  1.  Pt will be able to complete SL squat to above parallel with good form and no pain to demonstrate improved functional glut strength  Last PN: UE assist needed with good form with SL squat, increased left knee valgus compared to right progressing 2/1/2021  Current: continued valgus/varus with SL squat and high step ups today progressing 5/12/21     Updated goal 2/4/2021:  1. Pt bilateral HS flexibility will improve to at least 60* to allow pt to walk with less knee pain  Last PN: MET 4/12/21 left: 64             Right: 68     2.  Pt will report >/=80% improvement in symptoms to be able to return to hiking and working out without knee pain  Last PN: progressing 4/12/21 has had mild set back in last 6 weeks with acute flare up of back pain   Current: 100% improvement in right knee symptoms goal MET 5/12/21     PLAN  []  Upgrade activities as tolerated     [x]  Continue plan of care  []  Update interventions per flow sheet       []  Discharge due to:_  []  Other:_      Sina Lindo 5/12/2021  12:53 PM    Future Appointments   Date Time Provider [place] Tsai   5/12/2021  1:00 PM Luda Bean [facility]

## 2021-05-12 NOTE — PROGRESS NOTES
[facility] at the [address], [place], [address] [facility]  Phone: [phone]      Fax:  [phone]    Progress Note  Patient name: Yenifer Knowles Start of Care: 2020   Referral source: Samy Gabriel MD : 1970   Medical/Treatment Diagnosis: Right knee pain [M25.561] Onset Date:2020     Prior Hospitalization: see medical history Provider#: [id number]   Medications: Verified on Patient Summary List    Comorbidities: previous knee surgeries, Hx of LBP   Prior Level of Function: Increase in pain with running, descending stairs   Reported intermittent catching and locking with getting in/out of the car     Visits from Start of Care: 32    Missed Visits: 2    Progress Towards Goals:   [address]- To be accomplished in 14 visit(s):   2.  Pt will improve hip adduction drop to negative bilaterally with HEP to indicate femoral-acetabular mobility needed for gait. Eval:+ bilaterally, unable to assume test position on the left  Last PN: progressing 21 right cleared at end of session left at midline   Current: midline bilaterally progressing 21      4.  Pt FOTO score will increase by 8 points to show improvement in  function. Eval:65 (risk adjusted score 51)  Last PN:   75 goal MET      Updated goal 21  1. Pt will be able to complete SL squat to above parallel with good form and no pain to demonstrate improved functional glut strength  Last PN: UE assist needed with good form with SL squat, increased left knee valgus compared to right progressing 2021  Current: continued valgus/varus with SL squat and high step ups today progressing 21     Updated goal 2021:  1. Pt bilateral HS flexibility will improve to at least 60* to allow pt to walk with less knee pain  Last PN: MET 21 left: 64             Right: 68     2.  Pt will report >/=80% improvement in symptoms to be able to return to hiking and working out without knee pain  Last PN: progressing 4/12/21 has had mild set back in last 6 weeks with acute flare up of back pain   Current: 100% improvement in right knee symptoms goal MET 5/12/21        Key Functional Changes: Pt presented to therapy with C/C of acute onset lateral right knee pain in October 2020. Pt is making great progress towards goals with overall knee pain decreasing with ability to hike last week without knee pain however exacerbation of left sided low back pain with driving back from [place]. Overall mobility in pelvis in improving however still very challenged with decreasing valgus/varus with SL activities. Pt would benefit from continued skilled PT services to address remaining unmet goals.      Updated Goals: to be achieved in 6 treatments:   See goals above    ASSESSMENT/RECOMMENDATIONS:  [x]Continue therapy per initial plan/protocol at a frequency of 6 visits  []Continue therapy with the following recommended changes:_____________________      _____________________________________________________________________  []Discontinue therapy progressing towards or have reached established goals  []Discontinue therapy due to lack of appreciable progress towards goals  []Discontinue therapy due to lack of attendance or compliance  []Await Physician's recommendations/decisions regarding therapy  []Other:________________________________________________________________    Thank you for this referral.   Jennifer Archibald Remesic 5/12/2021 2:55 PM

## 2021-07-19 NOTE — PROGRESS NOTES
[facility]  [address] [place] [address], [address]  Tel. [phone]  Fax: [phone]    Physical Therapy Discharge Summary    Patient Name: Denzel Bolden : 1970   Treatment/Medical Diagnosis: Right knee pain [M25.561]   Referral Source: Kathrin Bocanegra MD     Date of Initial Visit: 21 Attended Visits: 32 Missed Visits: 2       SUMMARY OF TREATMENT  Pt attended only initial evaluation and    31     follow-ups and then did not return. Therefore a formal reassessment of goals was not performed. RECOMMENDATIONS  Discontinue physical therapy due to patient not returning. Pt shall remain under care of MD.      If you have any questions/comments please contact us directly at [phone]. Thank you for allowing us to assist in the care of your patient.     Therapist Signature: Jose Stephens PT, DPT, CIM[facility] Date: 21     Time: 11:19 AM

## 2024-08-01 NOTE — PROGRESS NOTES
Faxed to [facility] Delivered Order #[id number]  [phone]   PT DAILY TREATMENT NOTE/SHOULDER EVAL 10-18    Patient Name: Félix Hernandez  Date:2020  : 1970  [x]  Patient  Verified  Payor:  / Plan: [facility]  [facility] / Product Type:  /    In time:1:07  Out time:2:00  Total Treatment Time (min): 53  Visit #: 1 of 12      Treatment Area: Right shoulder pain [M25.511]  Pain in right arm [M79.601]    SUBJECTIVE  Pain Level (0-10 scale): 0  []constant [x]intermittent []improving []worsening []no change since onset    Any medication changes, allergies to medications, adverse drug reactions, diagnosis change, or new procedure performed?: [x] No    [] Yes (see summary sheet for update)  Subjective functional status/changes:     PLOF: working out 5-6days week   Mechanism of Injury: few weeks ago with doing pull ups right anterior and lateral shoulder  Current symptoms/Complaints: Pain increases with reaching up, abduction, pull ups, toes to bar 3/10. Describes pain as achy, stabbing with pull ups. Denies red flags and radicular symptoms. Decreases with rest. 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 \"    OBJECTIVE/EXAMINATION      35 min [x]Eval                  []Re-Eval       10 min Therapeutic Exercise:  [x] See flow sheet :   Rationale: increase ROM and increase strength to improve the patients ability to perform daily activities with decreased pain and symptom levels    8 min Manual Therapy:  Rib mobs with breaths, right apical expansion   Rationale: decrease pain, increase ROM and increase tissue extensibility to perform daily activities with decreased pain and symptom levels          With   [] TE   [] TA   [] neuro   [] other: Patient Education: [x] Review HEP    [] Progressed/Changed HEP based on:   [] positioning   [] body mechanics   [] transfers   [] heat/ice application    [] other:      Other Objective/Functional Measures: see initial al     Physical Therapy Evaluation - Shoulder    Posture: [] Poor    [x] Fair    [] Good    Describe:    ROM:  [] Unable to assess at this time                                           AROM                                                              PROM   Left Right  Left Right   Flexion 160* 160* Flexion     Extension   Extension     Scaption/ABD   Scaptin/ABD     ER @ 0 Degrees   ER @ 0 Degrees     ER @ 90 Degrees   ER @ 90 Degrees     IR @ 90 Degrees   IR @ 90 Degrees       End Feel / Painful Arc: firm, see ROM below     Strength:   [] Unable to assess at this time                                                                            L (1-5) R (1-5) Pain   Flexors 5 5 [] Yes   [] No   Abductors 5 5 [x] Yes   [] No   External Rotators 5 5 [] Yes   [] No   Internal Rotators 5 5 [] Yes   [] No   Supraspinatus 5 5 [] Yes   [] No   Serratus Anterior   [] Yes   [] No   Lower Trapezius   [] Yes   [] No   Elbow Flexion 5 5 [] Yes   [] No   Elbow Extension 5 5 [] Yes   [] No       Scapulohumoral Control / Rhythm:  Able to eccentrically lower with good control?  Left: [] Yes   [x] No     Right: [] Yes   [x] No    Accessory Motions:    Palpation  [] Min  [x] Mod  [] Severe    Location: pecs, lats, subscap   [] Min  [] Mod  [] Severe    Location:  [] Min  [] Mod  [] Severe    Location:    Optional Tests:    Sensation Left Right Reflexes Left Right   Biceps (C5)   Biceps (C5)     [place] Radial(C6-7)   Brachioradialis (C6)     [place] Ulnar(C8-T1)   Triceps (C7)       Adson's Test  [] Pos   [] Neg Yergason's Test [] Pos   [x] Neg  Maranda's Test  [] Pos   [] Neg Potter's Sign [] Pos   [] Neg  Neer's Test  [x] Pos   [] Neg Clunk Test  [] Pos   [] Neg  Hawkin's Test  [] Pos   [x] Neg AC Joint  [] Pos   [x] Neg  Speed's Test  [] Pos   [x] Neg SC Joint  [] Pos   [] Neg  Empty Can  [] Pos   [x] Neg Pectoral Tightness [x] Pos   [] Neg  Anterior Apprehension [] Pos   [x] Neg   Posterior Apprehension [] Pos   [x] Neg      Other Tests / Comments:   Lumbar flexion 7in, lesaning to left   Left lateral scapular winging  [facility] Special Tests (pre/post)  HGIR:                                                 Right: 25*/28*             Left: 30*/35*  Shoulder Flexion   Right: 134*/138*             Left: 135*/150*  Trunk Rot                                           Right: 17in    Left 15.5in   Shoulder Horizontal Abduction          Right: + /+             Left: + /+     Apical Ext Test:                                   Right: +/+            Left: +/+    Pain Level (0-10 scale) post treatment: 0    ASSESSMENT/Changes in Function: Pt is a 52yo male presenting to therapy with c/c right anterior and lateral shoulder pain 3 weeks ago with insidious onset. Pt was in therapy last year for shoulder with good results however pain has returned. Pain increases with pullups, reaching overhead and abduction. Denies red flags and radicular symptoms at this time. Pt demonstrates decreased shoulder AROM with increased thoracic extension, decreased rib mobility bilaterally with flattended thoracic spine noted, WFL UE strength however pain with abduction, positive Neers and TTP over pecs, subscap and lats on right. Signs and symptoms consistent with mechanical right shoulder pain pain. Pt would benefit from skilled PT services to address the above impairments to allow pt to return to working out without shoulder pain. Patient will continue to benefit from skilled PT services to modify and progress therapeutic interventions, address functional mobility deficits, address ROM deficits, address strength deficits, analyze and address soft tissue restrictions, analyze and cue movement patterns, analyze and modify body mechanics/ergonomics, assess and modify postural abnormalities and instruct in home and community integration to attain remaining goals.      []  See Plan of Care  []  See progress note/recertification  []  See Discharge Summary         Progress towards goals / Updated goals:  Short Term Goals: STG- To be accomplished in 2 week(s):  1. Pt will be independent with HEP to encourage prophylaxis. Eval:HEP dispensed     Long Term Goals: LTG- To be accomplished in 6 week(s):  1. Pt will demonstrate ability to complete pull ups without right shoulder pain to demonstrate improved scapular strength. Eval:pain with 1 pullup, increased thoracic extension and rib flare, challenged with PPT    2. Pt trunk rotation will improve to at least 15in to demonstrate improvef functional thoracic mobility  Eval: 17in right, 15.5in left     3. Pt right shoulder flexion and IR AROM  [facility] allow [facility] to complete ADLs with increased ease. Eval: 134* flexion ,25* IR    4. Pt FOTO score will increase by >/ = 8 points to show improvement in subjective function.   Eval:72  Current: will address at visit 5      PLAN  []  Upgrade activities as tolerated     [x]  Continue plan of care  []  Update interventions per flow sheet       []  Discharge due to:_  []  Other:_      Linda Albert 2/5/2020  1:07 PM